# Patient Record
Sex: MALE | Race: WHITE | Employment: OTHER | URBAN - METROPOLITAN AREA
[De-identification: names, ages, dates, MRNs, and addresses within clinical notes are randomized per-mention and may not be internally consistent; named-entity substitution may affect disease eponyms.]

---

## 2017-01-23 RX ORDER — IRBESARTAN AND HYDROCHLOROTHIAZIDE 300; 12.5 MG/1; MG/1
TABLET, FILM COATED ORAL
Qty: 90 TABLET | Refills: 0 | Status: SHIPPED | OUTPATIENT
Start: 2017-01-23 | End: 2017-05-23

## 2017-01-23 RX ORDER — ATORVASTATIN CALCIUM 40 MG/1
TABLET, FILM COATED ORAL
Qty: 90 TABLET | Refills: 0 | Status: SHIPPED | OUTPATIENT
Start: 2017-01-23 | End: 2017-08-30

## 2017-02-02 RX ORDER — PEN NEEDLE, DIABETIC 31 GX5/16"
NEEDLE, DISPOSABLE MISCELLANEOUS
Qty: 200 EACH | Refills: 3 | Status: SHIPPED | OUTPATIENT
Start: 2017-02-02 | End: 2017-11-13

## 2017-02-02 NOTE — TELEPHONE ENCOUNTER
LOV 8/29/16. Attempted to reach patient to schedule follow up but no answer and no VM available to leave message.

## 2017-02-07 ENCOUNTER — PATIENT OUTREACH (OUTPATIENT)
Dept: INTERNAL MEDICINE CLINIC | Facility: CLINIC | Age: 47
End: 2017-02-07

## 2017-02-07 DIAGNOSIS — Z12.5 PROSTATE CANCER SCREENING: ICD-10-CM

## 2017-02-07 DIAGNOSIS — E10.9 TYPE 1 DIABETES MELLITUS WITHOUT COMPLICATION (HCC): Primary | ICD-10-CM

## 2017-02-07 NOTE — PROGRESS NOTES
Spoke with pt. Discussed that he is due for repeat labs:  Hb A1c and PSA. Requested that pt have them done in the next week or two. Instructed pt that he does not need to fast for this blood work. Pt agrees to this plan.    PLAN:  Follow up 1 month to rev

## 2017-02-09 ENCOUNTER — HOSPITAL ENCOUNTER (OUTPATIENT)
Age: 47
Discharge: HOME | End: 2017-02-09
Payer: COMMERCIAL

## 2017-02-09 VITALS
DIASTOLIC BLOOD PRESSURE: 81 MMHG | SYSTOLIC BLOOD PRESSURE: 147 MMHG | HEART RATE: 76 BPM | OXYGEN SATURATION: 100 % | BODY MASS INDEX: 27.09 KG/M2 | TEMPERATURE: 97 F | HEIGHT: 72 IN | RESPIRATION RATE: 18 BRPM | WEIGHT: 200 LBS

## 2017-02-09 DIAGNOSIS — J01.10 ACUTE NON-RECURRENT FRONTAL SINUSITIS: Primary | ICD-10-CM

## 2017-02-09 PROCEDURE — 99214 OFFICE O/P EST MOD 30 MIN: CPT

## 2017-02-09 PROCEDURE — 99213 OFFICE O/P EST LOW 20 MIN: CPT

## 2017-02-09 RX ORDER — FLUTICASONE PROPIONATE 50 MCG
2 SPRAY, SUSPENSION (ML) NASAL DAILY
Qty: 16 G | Refills: 0 | Status: SHIPPED | OUTPATIENT
Start: 2017-02-09 | End: 2017-03-11

## 2017-02-09 RX ORDER — AMOXICILLIN AND CLAVULANATE POTASSIUM 875; 125 MG/1; MG/1
1 TABLET, FILM COATED ORAL 2 TIMES DAILY
Qty: 20 TABLET | Refills: 0 | Status: SHIPPED | OUTPATIENT
Start: 2017-02-09 | End: 2017-02-19

## 2017-02-09 RX ORDER — ACETAMINOPHEN 325 MG/1
325 TABLET ORAL EVERY 6 HOURS PRN
COMMUNITY

## 2017-02-09 NOTE — ED INITIAL ASSESSMENT (HPI)
Pt reports cough and sinus drainage for one week. Fever of 101 on Tues.  Cough productive at times with thick brown colored phlegm

## 2017-02-09 NOTE — ED PROVIDER NOTES
Patient presents with:  Cough/URI      HPI:     Best Hdez is a 40year old male who presents with a chief complaint of sinus pain, ingestion, and a productive cough with green and yellow sputum for the past 2 weeks.   He did have a fever couple days Reviewed.       Physical Exam:     Findings:    /81 mmHg  Pulse 76  Temp(Src) 97.4 °F (36.3 °C) (Temporal)  Resp 18  Ht 182.9 cm (6')  Wt 90.719 kg  BMI 27.12 kg/m2  SpO2 100%  GENERAL: well developed, well nourished, well hydrated, no distress  SKIN:

## 2017-02-23 ENCOUNTER — TELEPHONE (OUTPATIENT)
Dept: ENDOCRINOLOGY CLINIC | Facility: CLINIC | Age: 47
End: 2017-02-23

## 2017-02-23 NOTE — TELEPHONE ENCOUNTER
Will need to be signed by [facility] will hold form until she returns to office 2/27

## 2017-02-23 NOTE — TELEPHONE ENCOUNTER
Pt dropped off letter from [facility] for Drivers License needs completed by Dr Jose Booker.  Pt will  letter, placed in nurses box

## 2017-02-27 NOTE — TELEPHONE ENCOUNTER
Form has been signed by Mitchell Arias. Called patient. Informed him form has been placed at the  in [place] for him to . He verbalized his understanding.

## 2017-03-06 ENCOUNTER — HOSPITAL ENCOUNTER (OUTPATIENT)
Age: 47
Discharge: HOME | End: 2017-03-06
Attending: EMERGENCY MEDICINE
Payer: COMMERCIAL

## 2017-03-06 VITALS
OXYGEN SATURATION: 95 % | HEART RATE: 85 BPM | DIASTOLIC BLOOD PRESSURE: 86 MMHG | SYSTOLIC BLOOD PRESSURE: 142 MMHG | TEMPERATURE: 98 F | RESPIRATION RATE: 16 BRPM

## 2017-03-06 DIAGNOSIS — B34.9 VIRAL ILLNESS: Primary | ICD-10-CM

## 2017-03-06 LAB — S PYO AG THROAT QL: NEGATIVE

## 2017-03-06 PROCEDURE — 99213 OFFICE O/P EST LOW 20 MIN: CPT

## 2017-03-06 PROCEDURE — 87430 STREP A AG IA: CPT

## 2017-03-06 PROCEDURE — 99212 OFFICE O/P EST SF 10 MIN: CPT

## 2017-03-06 NOTE — ED PROVIDER NOTES
Patient Seen in: [address]    History   Patient presents with:  Cough/URI    Stated Complaint: possible strep    HPI    Patient is is a healthy 45-year-old male who presents to immediate care along with his son who is be Status: Never Smoker                      Alcohol Use: Yes               Review of Systems   Constitutional: Negative. Negative for fever. HENT: Positive for congestion. Eyes: Negative. Respiratory: Negative. Cardiovascular: Negative.     Genito Impression:  Viral illness  (primary encounter diagnosis)    Disposition:  Discharge    Follow-up:  Sam Goff MD  [id number] [facility] [address] [phone]    In 3 days  If symptoms worsen      Medications Prescribed:  Current Discharge

## 2017-03-17 ENCOUNTER — TELEPHONE (OUTPATIENT)
Dept: ENDOCRINOLOGY CLINIC | Facility: CLINIC | Age: 47
End: 2017-03-17

## 2017-03-17 NOTE — TELEPHONE ENCOUNTER
Pt drooped off form again states Dr Ava Jean missed checking one of the boxes on form,call pt when ready .  Envelope left in nurses box

## 2017-03-24 ENCOUNTER — HOSPITAL ENCOUNTER (OUTPATIENT)
Age: 47
Discharge: HOME | End: 2017-03-24
Payer: COMMERCIAL

## 2017-03-24 VITALS
HEIGHT: 72 IN | SYSTOLIC BLOOD PRESSURE: 133 MMHG | WEIGHT: 200 LBS | OXYGEN SATURATION: 98 % | BODY MASS INDEX: 27.09 KG/M2 | TEMPERATURE: 98 F | RESPIRATION RATE: 14 BRPM | DIASTOLIC BLOOD PRESSURE: 82 MMHG | HEART RATE: 79 BPM

## 2017-03-24 DIAGNOSIS — J06.9 VIRAL UPPER RESPIRATORY TRACT INFECTION: Primary | ICD-10-CM

## 2017-03-24 LAB — S PYO AG THROAT QL: NEGATIVE

## 2017-03-24 PROCEDURE — 99212 OFFICE O/P EST SF 10 MIN: CPT

## 2017-03-24 PROCEDURE — 87430 STREP A AG IA: CPT

## 2017-03-24 PROCEDURE — 99213 OFFICE O/P EST LOW 20 MIN: CPT

## 2017-03-24 NOTE — ED INITIAL ASSESSMENT (HPI)
REPORTS COFFEE COLORED NASAL DRAINAGE SINCE LAST NIGHT. COUGH FOR A \"COUPLE OF DAYS. \"  DENIES Elke Welch. SEMAJ SIFUENTES AT THE BEDSIDE. DID NOT RECEIVE A FLU VACCINATION THIS YEAR.

## 2017-03-24 NOTE — ED PROVIDER NOTES
[facility]    History   Patient presents with:  Cough/URI    Stated Complaint: CONGESTION    HPI    Patient is a 50-year-old male with a history of type 1 diabetes who presents for evaluation of congestio cough. Cardiovascular: Negative. Gastrointestinal: Negative. Endocrine: Negative. Genitourinary: Negative. Musculoskeletal: Negative. Skin: Negative. Allergic/Immunologic: Negative. Neurological: Negative.     Hematological: Negative He has a normal mood and affect. His behavior is normal. Judgment and thought content normal.   Nursing note and vitals reviewed. ED Course     Labs Reviewed   EMH POCT RAPID STREP - Normal       MDM   Vitals stable, patient appears nontoxic.   Phy

## 2017-04-24 ENCOUNTER — OFFICE VISIT (OUTPATIENT)
Dept: ENDOCRINOLOGY CLINIC | Facility: CLINIC | Age: 47
End: 2017-04-24

## 2017-04-24 VITALS
DIASTOLIC BLOOD PRESSURE: 70 MMHG | HEART RATE: 92 BPM | WEIGHT: 220.63 LBS | HEIGHT: 72 IN | BODY MASS INDEX: 29.88 KG/M2 | SYSTOLIC BLOOD PRESSURE: 126 MMHG

## 2017-04-24 DIAGNOSIS — E10.65 UNCONTROLLED TYPE 1 DIABETES MELLITUS WITH HYPERGLYCEMIA (HCC): Primary | ICD-10-CM

## 2017-04-24 PROCEDURE — 83036 HEMOGLOBIN GLYCOSYLATED A1C: CPT | Performed by: INTERNAL MEDICINE

## 2017-04-24 PROCEDURE — 82962 GLUCOSE BLOOD TEST: CPT | Performed by: INTERNAL MEDICINE

## 2017-04-24 PROCEDURE — 36416 COLLJ CAPILLARY BLOOD SPEC: CPT | Performed by: INTERNAL MEDICINE

## 2017-04-24 PROCEDURE — 99214 OFFICE O/P EST MOD 30 MIN: CPT | Performed by: INTERNAL MEDICINE

## 2017-04-24 NOTE — PROGRESS NOTES
Name: Sherita Lennox  Date: 4/24/2017    Referring Physician: No ref. provider found    HISTORY OF PRESENT ILLNESS   Sherita Lennox is a 56year old male who presents for diabetes mellitus.   He has not been for routine follow up and BG levels have sign 3  •  HUMALOG KWIKPEN 100 UNIT/ML Subcutaneous Solution Pen-injector, INJECT 50 UNITS INTO THE SKIN EVERY DAY AS DIRECTED (Patient taking differently: INJECT 40-50 UNITS INTO THE SKIN EVERY DAY AS DIRECTED), Disp: 30 mL, Rfl: 3  •  Insulin Pen Needle (1ST to time, self, and place  Nutritional:  no abnormal weight gain or loss    ASSESSMENT/PLAN:      1.  Diabetes Mellitus Type 1, Uncontrolled  -Uncontrolled, HgA1c 10.1% -->stable  -I suspect that he continues to miss insulin doses leading to uncontrolled hyp

## 2017-05-18 RX ORDER — INSULIN GLARGINE 100 [IU]/ML
INJECTION, SOLUTION SUBCUTANEOUS
Qty: 30 ML | Refills: 3 | Status: SHIPPED | OUTPATIENT
Start: 2017-05-18 | End: 2018-01-03

## 2017-05-23 RX ORDER — IRBESARTAN AND HYDROCHLOROTHIAZIDE 300; 12.5 MG/1; MG/1
TABLET, FILM COATED ORAL
Qty: 90 TABLET | Refills: 1 | Status: SHIPPED | OUTPATIENT
Start: 2017-05-23 | End: 2018-01-08

## 2017-05-23 RX ORDER — INSULIN LISPRO 100 [IU]/ML
INJECTION, SOLUTION INTRAVENOUS; SUBCUTANEOUS
Qty: 30 ML | Refills: 2 | Status: SHIPPED | OUTPATIENT
Start: 2017-05-23 | End: 2017-12-12

## 2017-05-26 ENCOUNTER — PATIENT OUTREACH (OUTPATIENT)
Dept: INTERNAL MEDICINE CLINIC | Facility: CLINIC | Age: 47
End: 2017-05-26

## 2017-05-30 ENCOUNTER — HOSPITAL ENCOUNTER (OUTPATIENT)
Age: 47
Discharge: HOME | End: 2017-05-30
Payer: COMMERCIAL

## 2017-05-30 VITALS
HEIGHT: 72 IN | HEART RATE: 88 BPM | RESPIRATION RATE: 18 BRPM | DIASTOLIC BLOOD PRESSURE: 85 MMHG | TEMPERATURE: 98 F | SYSTOLIC BLOOD PRESSURE: 130 MMHG | OXYGEN SATURATION: 96 % | WEIGHT: 215 LBS | BODY MASS INDEX: 29.12 KG/M2

## 2017-05-30 DIAGNOSIS — J01.00 ACUTE NON-RECURRENT MAXILLARY SINUSITIS: Primary | ICD-10-CM

## 2017-05-30 DIAGNOSIS — R05.9 COUGH: ICD-10-CM

## 2017-05-30 PROCEDURE — 99214 OFFICE O/P EST MOD 30 MIN: CPT

## 2017-05-30 PROCEDURE — 99213 OFFICE O/P EST LOW 20 MIN: CPT

## 2017-05-30 RX ORDER — AMOXICILLIN AND CLAVULANATE POTASSIUM 875; 125 MG/1; MG/1
1 TABLET, FILM COATED ORAL 2 TIMES DAILY
Qty: 20 TABLET | Refills: 0 | Status: SHIPPED | OUTPATIENT
Start: 2017-05-30 | End: 2017-06-09

## 2017-05-30 RX ORDER — FLUTICASONE PROPIONATE 50 MCG
2 SPRAY, SUSPENSION (ML) NASAL DAILY
Qty: 16 G | Refills: 0 | Status: SHIPPED | OUTPATIENT
Start: 2017-05-30 | End: 2017-06-29

## 2017-05-31 NOTE — ED PROVIDER NOTES
Patient Seen in: [facility] Immediate Care In [address] [place] [address]    History   Patient presents with:  Cough/URI    Stated Complaint: URI    HPI    52-year-old male presents for complaint of cough, nasal congestion, facial pressure.   Symptoms have been prese Alcohol Use: Yes               Review of Systems   Constitutional: Negative. HENT: Positive for congestion, ear pain, postnasal drip and sinus pressure. Eyes: Negative. Respiratory: Positive for cough. Cardiovascular: Negative.     Jose Romero rales.   Musculoskeletal: Normal range of motion. Neurological: He is alert and oriented to person, place, and time. Skin: Skin is warm, dry and intact. Psychiatric: He has a normal mood and affect.  His speech is normal and behavior is normal.   Nurs

## 2017-05-31 NOTE — ED INITIAL ASSESSMENT (HPI)
Ill for the past 2 days. Cough with productive mucus. No fever.  Has facial pressure and post nasal drip

## 2017-08-30 RX ORDER — ATORVASTATIN CALCIUM 40 MG/1
TABLET, FILM COATED ORAL
Qty: 90 TABLET | Refills: 0 | Status: SHIPPED | OUTPATIENT
Start: 2017-08-30 | End: 2017-11-22

## 2017-10-10 ENCOUNTER — HOSPITAL ENCOUNTER (OUTPATIENT)
Age: 47
Discharge: HOME | End: 2017-10-10
Attending: EMERGENCY MEDICINE
Payer: COMMERCIAL

## 2017-10-10 ENCOUNTER — APPOINTMENT (OUTPATIENT)
Dept: GENERAL RADIOLOGY | Age: 47
End: 2017-10-10
Attending: EMERGENCY MEDICINE
Payer: COMMERCIAL

## 2017-10-10 VITALS
TEMPERATURE: 99 F | BODY MASS INDEX: 28.99 KG/M2 | SYSTOLIC BLOOD PRESSURE: 160 MMHG | RESPIRATION RATE: 18 BRPM | HEART RATE: 85 BPM | WEIGHT: 214 LBS | OXYGEN SATURATION: 96 % | HEIGHT: 72 IN | DIASTOLIC BLOOD PRESSURE: 92 MMHG

## 2017-10-10 DIAGNOSIS — J18.9 COMMUNITY ACQUIRED PNEUMONIA OF LEFT LUNG, UNSPECIFIED PART OF LUNG: Primary | ICD-10-CM

## 2017-10-10 PROCEDURE — 99214 OFFICE O/P EST MOD 30 MIN: CPT

## 2017-10-10 PROCEDURE — 71020 XR CHEST PA + LAT CHEST (CPT=71020): CPT | Performed by: EMERGENCY MEDICINE

## 2017-10-10 PROCEDURE — 99213 OFFICE O/P EST LOW 20 MIN: CPT

## 2017-10-10 RX ORDER — LEVOFLOXACIN 750 MG/1
750 TABLET ORAL DAILY
Qty: 7 TABLET | Refills: 0 | Status: SHIPPED | OUTPATIENT
Start: 2017-10-10 | End: 2017-10-17

## 2017-10-10 NOTE — ED INITIAL ASSESSMENT (HPI)
Cough for 2 days with fever and thick yellow sputum. Fever comes down with otc motrin and tylenol.  Easy non labored resp

## 2017-10-10 NOTE — ED PROVIDER NOTES
Patient Seen in: [facility] Immediate Care In [address] [place] [address]    History   Patient presents with:  Cough/URI    Stated Complaint: coughing, back pain    HPI    52-year-old male, history of type 1 diabetes and essential hypertension, presents complaining Ear: External ear normal.   Left Ear: External ear normal.   Mouth/Throat: Oropharynx is clear and moist.   Eyes: Conjunctivae and EOM are normal.   Cardiovascular: Normal rate. No murmur heard.   Pulmonary/Chest: Effort normal and breath sounds normal.

## 2017-10-21 ENCOUNTER — HOSPITAL ENCOUNTER (OUTPATIENT)
Age: 47
Discharge: HOME | End: 2017-10-21
Attending: EMERGENCY MEDICINE
Payer: COMMERCIAL

## 2017-10-21 ENCOUNTER — APPOINTMENT (OUTPATIENT)
Dept: GENERAL RADIOLOGY | Age: 47
End: 2017-10-21
Attending: EMERGENCY MEDICINE
Payer: COMMERCIAL

## 2017-10-21 VITALS
SYSTOLIC BLOOD PRESSURE: 144 MMHG | TEMPERATURE: 98 F | HEIGHT: 72 IN | OXYGEN SATURATION: 97 % | RESPIRATION RATE: 16 BRPM | BODY MASS INDEX: 28.44 KG/M2 | HEART RATE: 82 BPM | DIASTOLIC BLOOD PRESSURE: 81 MMHG | WEIGHT: 210 LBS

## 2017-10-21 DIAGNOSIS — J18.9 COMMUNITY ACQUIRED PNEUMONIA OF LEFT UPPER LOBE OF LUNG: Primary | ICD-10-CM

## 2017-10-21 PROCEDURE — 71020 XR CHEST PA + LAT CHEST (CPT=71020): CPT | Performed by: EMERGENCY MEDICINE

## 2017-10-21 PROCEDURE — 94640 AIRWAY INHALATION TREATMENT: CPT

## 2017-10-21 PROCEDURE — 99214 OFFICE O/P EST MOD 30 MIN: CPT

## 2017-10-21 RX ORDER — ALBUTEROL SULFATE 2.5 MG/3ML
2.5 SOLUTION RESPIRATORY (INHALATION) ONCE
Status: COMPLETED | OUTPATIENT
Start: 2017-10-21 | End: 2017-10-21

## 2017-10-21 NOTE — ED INITIAL ASSESSMENT (HPI)
C/o coughing 1 1/2 week was Dx with Pneumonia. Now pt claims he is having chest tightness.  No fever

## 2017-10-21 NOTE — ED PROVIDER NOTES
Patient Seen in: [facility] Immediate Care In [address] [place] [address]    History   Patient presents with:  Cough/URI    Stated Complaint: chest tightness/sob    HPI    Patient is a 70-year-old male who presents to the urgent care with a chief complaint of persis Eyes: Conjunctivae and EOM are normal. Pupils are equal, round, and reactive to light. Neck: Neck supple. Cardiovascular: Normal rate, regular rhythm and normal heart sounds. Pulmonary/Chest: Effort normal. He has rhonchi in the left middle field.

## 2017-11-13 RX ORDER — PEN NEEDLE, DIABETIC 31 GX5/16"
NEEDLE, DISPOSABLE MISCELLANEOUS
Qty: 200 EACH | Refills: 1 | Status: SHIPPED | OUTPATIENT
Start: 2017-11-13 | End: 2018-01-08

## 2017-11-20 ENCOUNTER — HOSPITAL ENCOUNTER (OUTPATIENT)
Age: 47
Discharge: HOME | End: 2017-11-20
Attending: EMERGENCY MEDICINE
Payer: COMMERCIAL

## 2017-11-20 VITALS
WEIGHT: 200 LBS | SYSTOLIC BLOOD PRESSURE: 145 MMHG | OXYGEN SATURATION: 97 % | RESPIRATION RATE: 16 BRPM | HEART RATE: 78 BPM | BODY MASS INDEX: 27 KG/M2 | DIASTOLIC BLOOD PRESSURE: 83 MMHG | TEMPERATURE: 98 F

## 2017-11-20 DIAGNOSIS — J01.90 ACUTE SINUSITIS, RECURRENCE NOT SPECIFIED, UNSPECIFIED LOCATION: Primary | ICD-10-CM

## 2017-11-20 PROCEDURE — 99214 OFFICE O/P EST MOD 30 MIN: CPT

## 2017-11-20 PROCEDURE — 99213 OFFICE O/P EST LOW 20 MIN: CPT

## 2017-11-20 RX ORDER — AMOXICILLIN AND CLAVULANATE POTASSIUM 875; 125 MG/1; MG/1
1 TABLET, FILM COATED ORAL 2 TIMES DAILY
Qty: 14 TABLET | Refills: 0 | Status: SHIPPED | OUTPATIENT
Start: 2017-11-20 | End: 2017-11-27

## 2017-11-20 NOTE — ED PROVIDER NOTES
Patient Seen in: [facility] Immediate Care In [address] [place] [address]    History   Patient presents with:  Cough/URI    Stated Complaint: sinus pain    HPI    Patient is a 52-year-old man with a history of type 1 diabetes who complains of sinus congestion and di round, and reactive to light. Neck: Neck supple. Cardiovascular: Normal rate, regular rhythm, normal heart sounds and intact distal pulses. Pulmonary/Chest: Effort normal and breath sounds normal. No respiratory distress. Abdominal: Soft.  Bowel so in a couple days. He is traveling out of town for Thanksgiving and 3 days. He will continue over-the-counter symptomatic treatment.     Condition upon leaving the department: Stable            Disposition and Plan     Clinical Impression:  Acute sinusitis

## 2017-11-22 RX ORDER — ATORVASTATIN CALCIUM 40 MG/1
TABLET, FILM COATED ORAL
Qty: 30 TABLET | Refills: 0 | Status: SHIPPED | OUTPATIENT
Start: 2017-11-22 | End: 2018-01-08

## 2017-12-12 RX ORDER — INSULIN LISPRO 100 [IU]/ML
INJECTION, SOLUTION INTRAVENOUS; SUBCUTANEOUS
Qty: 30 ML | Refills: 1 | Status: SHIPPED | OUTPATIENT
Start: 2017-12-12 | End: 2018-01-08

## 2017-12-12 NOTE — TELEPHONE ENCOUNTER
LOV 4/2017. Follow up scheduled 1/2018. Per [facility] protocol ok to refill through upcoming appt.

## 2018-01-03 RX ORDER — INSULIN GLARGINE 100 [IU]/ML
INJECTION, SOLUTION SUBCUTANEOUS
Qty: 30 ML | Refills: 0 | Status: SHIPPED | OUTPATIENT
Start: 2018-01-03 | End: 2018-01-08

## 2018-01-05 ENCOUNTER — HOSPITAL ENCOUNTER (OUTPATIENT)
Age: 48
Discharge: HOME | End: 2018-01-05
Payer: COMMERCIAL

## 2018-01-05 VITALS
DIASTOLIC BLOOD PRESSURE: 86 MMHG | HEIGHT: 72 IN | TEMPERATURE: 98 F | HEART RATE: 83 BPM | BODY MASS INDEX: 28.44 KG/M2 | OXYGEN SATURATION: 98 % | WEIGHT: 210 LBS | SYSTOLIC BLOOD PRESSURE: 155 MMHG | RESPIRATION RATE: 16 BRPM

## 2018-01-05 DIAGNOSIS — J01.10 ACUTE NON-RECURRENT FRONTAL SINUSITIS: Primary | ICD-10-CM

## 2018-01-05 PROCEDURE — 99214 OFFICE O/P EST MOD 30 MIN: CPT

## 2018-01-05 PROCEDURE — 99213 OFFICE O/P EST LOW 20 MIN: CPT

## 2018-01-05 RX ORDER — CEFDINIR 300 MG/1
300 CAPSULE ORAL 2 TIMES DAILY
Qty: 20 CAPSULE | Refills: 0 | Status: SHIPPED | OUTPATIENT
Start: 2018-01-05 | End: 2018-01-15

## 2018-01-05 NOTE — ED PROVIDER NOTES
Patient presents with:  Cough/URI      HPI:     Jany Guthrie is a 56year old male who presents with a chief complaint of frontal sinus congestion, thick purulent drainage coming from the nose, frontal sinus discomfort, postnasal drip, and a dry cough 155/86   Pulse 83   Temp 98.4 °F (36.9 °C) (Oral)   Resp 16   Ht 182.9 cm (6')   Wt 95.3 kg   SpO2 98%   BMI 28.48 kg/m²   GENERAL: well developed, well nourished, well hydrated, no distress  SKIN: good skin turgor, no obvious rashes  NECK: supple, no glenys

## 2018-01-05 NOTE — ED INITIAL ASSESSMENT (HPI)
Presents with facial pressure and nasal congestion with thick drainage for 2 weeks. Reports fever as high as 99F, denies cough, sore throat, SOB, or chest pain.

## 2018-01-08 ENCOUNTER — APPOINTMENT (OUTPATIENT)
Dept: LAB | Facility: HOSPITAL | Age: 48
End: 2018-01-08
Attending: INTERNAL MEDICINE
Payer: COMMERCIAL

## 2018-01-08 ENCOUNTER — OFFICE VISIT (OUTPATIENT)
Dept: ENDOCRINOLOGY CLINIC | Facility: CLINIC | Age: 48
End: 2018-01-08

## 2018-01-08 VITALS
WEIGHT: 221 LBS | SYSTOLIC BLOOD PRESSURE: 136 MMHG | BODY MASS INDEX: 29.93 KG/M2 | HEART RATE: 92 BPM | HEIGHT: 72 IN | DIASTOLIC BLOOD PRESSURE: 70 MMHG

## 2018-01-08 DIAGNOSIS — Z79.4 UNCONTROLLED TYPE 2 DIABETES MELLITUS WITH HYPERGLYCEMIA, WITH LONG-TERM CURRENT USE OF INSULIN (HCC): Primary | ICD-10-CM

## 2018-01-08 DIAGNOSIS — E11.65 UNCONTROLLED TYPE 2 DIABETES MELLITUS WITH HYPERGLYCEMIA, WITH LONG-TERM CURRENT USE OF INSULIN (HCC): Primary | ICD-10-CM

## 2018-01-08 DIAGNOSIS — E10.65 UNCONTROLLED TYPE 1 DIABETES MELLITUS WITH HYPERGLYCEMIA (HCC): ICD-10-CM

## 2018-01-08 LAB
ALBUMIN SERPL BCP-MCNC: 4.7 G/DL (ref 3.5–4.8)
ALBUMIN/GLOB SERPL: 1.6 {RATIO} (ref 1–2)
ALP SERPL-CCNC: 73 U/L (ref 32–100)
ALT SERPL-CCNC: 24 U/L (ref 17–63)
ANION GAP SERPL CALC-SCNC: 8 MMOL/L (ref 0–18)
AST SERPL-CCNC: 24 U/L (ref 15–41)
BILIRUB SERPL-MCNC: 0.8 MG/DL (ref 0.3–1.2)
BUN SERPL-MCNC: 13 MG/DL (ref 8–20)
BUN/CREAT SERPL: 11.5 (ref 10–20)
CALCIUM SERPL-MCNC: 9.8 MG/DL (ref 8.5–10.5)
CARTRIDGE LOT#: ABNORMAL NUMERIC
CHLORIDE SERPL-SCNC: 102 MMOL/L (ref 95–110)
CHOLEST SERPL-MCNC: 185 MG/DL (ref 110–200)
CO2 SERPL-SCNC: 29 MMOL/L (ref 22–32)
CREAT SERPL-MCNC: 1.13 MG/DL (ref 0.5–1.5)
CREAT UR-MCNC: 117.5 MG/DL
GLOBULIN PLAS-MCNC: 3 G/DL (ref 2.5–3.7)
GLUCOSE BLOOD: 81
GLUCOSE SERPL-MCNC: 81 MG/DL (ref 70–99)
HDLC SERPL-MCNC: 46 MG/DL
HEMOGLOBIN A1C: 10 % (ref 4.3–5.6)
LDLC SERPL CALC-MCNC: 121 MG/DL (ref 0–99)
MICROALBUMIN UR-MCNC: 7.3 MG/DL (ref 0–1.8)
MICROALBUMIN/CREAT UR: 62.1 MG/G{CREAT} (ref 0–20)
NONHDLC SERPL-MCNC: 139 MG/DL
OSMOLALITY UR CALC.SUM OF ELEC: 287 MOSM/KG (ref 275–295)
POTASSIUM SERPL-SCNC: 4.4 MMOL/L (ref 3.3–5.1)
PROT SERPL-MCNC: 7.7 G/DL (ref 5.9–8.4)
SODIUM SERPL-SCNC: 139 MMOL/L (ref 136–144)
TEST STRIP LOT #: NORMAL NUMERIC
TRIGL SERPL-MCNC: 92 MG/DL (ref 1–149)
TSH SERPL-ACNC: 3.98 UIU/ML (ref 0.45–5.33)

## 2018-01-08 PROCEDURE — 99212 OFFICE O/P EST SF 10 MIN: CPT | Performed by: INTERNAL MEDICINE

## 2018-01-08 PROCEDURE — 82570 ASSAY OF URINE CREATININE: CPT

## 2018-01-08 PROCEDURE — 36415 COLL VENOUS BLD VENIPUNCTURE: CPT

## 2018-01-08 PROCEDURE — 82962 GLUCOSE BLOOD TEST: CPT | Performed by: INTERNAL MEDICINE

## 2018-01-08 PROCEDURE — 36416 COLLJ CAPILLARY BLOOD SPEC: CPT | Performed by: INTERNAL MEDICINE

## 2018-01-08 PROCEDURE — 84443 ASSAY THYROID STIM HORMONE: CPT

## 2018-01-08 PROCEDURE — 80053 COMPREHEN METABOLIC PANEL: CPT

## 2018-01-08 PROCEDURE — 82043 UR ALBUMIN QUANTITATIVE: CPT

## 2018-01-08 PROCEDURE — 80061 LIPID PANEL: CPT

## 2018-01-08 PROCEDURE — 83036 HEMOGLOBIN GLYCOSYLATED A1C: CPT | Performed by: INTERNAL MEDICINE

## 2018-01-08 PROCEDURE — 99214 OFFICE O/P EST MOD 30 MIN: CPT | Performed by: INTERNAL MEDICINE

## 2018-01-08 RX ORDER — IRBESARTAN AND HYDROCHLOROTHIAZIDE 300; 12.5 MG/1; MG/1
TABLET, FILM COATED ORAL
Qty: 90 TABLET | Refills: 1 | Status: SHIPPED | OUTPATIENT
Start: 2018-01-08 | End: 2018-06-22

## 2018-01-08 RX ORDER — ATORVASTATIN CALCIUM 40 MG/1
TABLET, FILM COATED ORAL
Qty: 30 TABLET | Refills: 1 | Status: SHIPPED | OUTPATIENT
Start: 2018-01-08 | End: 2018-10-06

## 2018-01-08 NOTE — PROGRESS NOTES
Name: Shruthi Stanton  Date: 1/8/2018    Referring Physician: No ref. provider found    HISTORY OF PRESENT ILLNESS   Shruthi Stanton is a 56year old male who presents for diabetes mellitus.   He has not been for routine follow up and BG levels have signi SKIN EVERY DAY AS DIRECTED (Patient taking differently: per sliding scale), Disp: 30 mL, Rfl: 1  •  ATORVASTATIN 40 MG Oral Tab, TAKE ONE TABLET BY MOUTH NIGHTLY, Disp: 30 tablet, Rfl: 0  •  BD PEN NEEDLE SHORT U/F 31G X 8 MM Does not apply Misc, USE FOUR murmurs, S3 or S4  Gastrointestinal:  normal bowel sounds and no palpable masses in abdomen, organomegaly or tenderness   Musculoskeletal:  normal muscle strength and tone  Skin:  normal moisture and skin texture  Neuro:  sensory grossly intact and motor g

## 2018-01-10 ENCOUNTER — TELEPHONE (OUTPATIENT)
Dept: ENDOCRINOLOGY CLINIC | Facility: CLINIC | Age: 48
End: 2018-01-10

## 2018-03-25 ENCOUNTER — HOSPITAL ENCOUNTER (OUTPATIENT)
Age: 48
Discharge: HOME | End: 2018-03-25
Attending: EMERGENCY MEDICINE
Payer: COMMERCIAL

## 2018-03-25 VITALS
SYSTOLIC BLOOD PRESSURE: 160 MMHG | TEMPERATURE: 98 F | DIASTOLIC BLOOD PRESSURE: 88 MMHG | OXYGEN SATURATION: 99 % | HEIGHT: 72 IN | HEART RATE: 84 BPM | BODY MASS INDEX: 28.44 KG/M2 | WEIGHT: 210 LBS | RESPIRATION RATE: 16 BRPM

## 2018-03-25 DIAGNOSIS — J01.40 ACUTE NON-RECURRENT PANSINUSITIS: Primary | ICD-10-CM

## 2018-03-25 PROCEDURE — 99213 OFFICE O/P EST LOW 20 MIN: CPT

## 2018-03-25 PROCEDURE — 99214 OFFICE O/P EST MOD 30 MIN: CPT

## 2018-03-25 RX ORDER — AMOXICILLIN AND CLAVULANATE POTASSIUM 875; 125 MG/1; MG/1
1 TABLET, FILM COATED ORAL 2 TIMES DAILY
Qty: 20 TABLET | Refills: 0 | Status: SHIPPED | OUTPATIENT
Start: 2018-03-25 | End: 2018-04-04

## 2018-03-25 NOTE — ED PROVIDER NOTES
Patient Seen in: [facility] Immediate Care In [address] [place] [address]    History   Patient presents with:  Cough/URI    Stated Complaint: HA/Sinus Pain    HPI    The patient is a 50-year-old male with past history of hypertension, diabetes mellitus who presents mild posterior pharyngeal erythema. There is mild diffuse sinus tenderness to palpation.   Chest: Clear to auscultation, no tenderness  Cardiovascular: Regular rate and rhythm without murmur  Abdomen: Soft, nontender and nondistended  Neurologic: Patient i

## 2018-05-16 ENCOUNTER — TELEPHONE (OUTPATIENT)
Dept: ENDOCRINOLOGY CLINIC | Facility: CLINIC | Age: 48
End: 2018-05-16

## 2018-05-16 RX ORDER — INSULIN LISPRO 100 [IU]/ML
INJECTION, SOLUTION INTRAVENOUS; SUBCUTANEOUS
Qty: 30 ML | Refills: 0 | Status: CANCELLED | OUTPATIENT
Start: 2018-05-16

## 2018-05-16 NOTE — TELEPHONE ENCOUNTER
Rita/Pharm calling for mutual pt regarding refill rx:Humalog,pls call at:[phone],thanks.    *Pt is completely out of rx  Current Outpatient Prescriptions:   •  Insulin Lispro (HUMALOG KWIKPEN) 100 UNIT/ML Subcutaneous Solution Pen-injector, ADMINI

## 2018-05-16 NOTE — TELEPHONE ENCOUNTER
Patient states he is completely out of Humalog insulin - called pt and booked f/u first available apt in June    Filled order per [facility] protocol    LOV 1/8/18 - F/U 6/22/18    Called pt at work and booked MD approval slot on 6/22/18 @1:30 in order to fill ins

## 2018-06-15 RX ORDER — INSULIN LISPRO 100 [IU]/ML
INJECTION, SOLUTION INTRAVENOUS; SUBCUTANEOUS
Qty: 45 ML | Refills: 1 | Status: SHIPPED | OUTPATIENT
Start: 2018-06-15 | End: 2019-11-11 | Stop reason: DRUGHIGH

## 2018-06-22 ENCOUNTER — OFFICE VISIT (OUTPATIENT)
Dept: ENDOCRINOLOGY CLINIC | Facility: CLINIC | Age: 48
End: 2018-06-22

## 2018-06-22 VITALS
HEIGHT: 72 IN | RESPIRATION RATE: 18 BRPM | TEMPERATURE: 98 F | BODY MASS INDEX: 30.5 KG/M2 | HEART RATE: 92 BPM | WEIGHT: 225.19 LBS | DIASTOLIC BLOOD PRESSURE: 84 MMHG | SYSTOLIC BLOOD PRESSURE: 155 MMHG

## 2018-06-22 DIAGNOSIS — E10.8 UNCONTROLLED TYPE 1 DIABETES MELLITUS WITH COMPLICATION (HCC): Primary | ICD-10-CM

## 2018-06-22 DIAGNOSIS — E10.65 UNCONTROLLED TYPE 1 DIABETES MELLITUS WITH COMPLICATION (HCC): Primary | ICD-10-CM

## 2018-06-22 PROCEDURE — 36416 COLLJ CAPILLARY BLOOD SPEC: CPT | Performed by: INTERNAL MEDICINE

## 2018-06-22 PROCEDURE — 82962 GLUCOSE BLOOD TEST: CPT | Performed by: INTERNAL MEDICINE

## 2018-06-22 PROCEDURE — 99212 OFFICE O/P EST SF 10 MIN: CPT | Performed by: INTERNAL MEDICINE

## 2018-06-22 PROCEDURE — 83036 HEMOGLOBIN GLYCOSYLATED A1C: CPT | Performed by: INTERNAL MEDICINE

## 2018-06-22 PROCEDURE — 99214 OFFICE O/P EST MOD 30 MIN: CPT | Performed by: INTERNAL MEDICINE

## 2018-06-22 RX ORDER — IRBESARTAN AND HYDROCHLOROTHIAZIDE 300; 12.5 MG/1; MG/1
TABLET, FILM COATED ORAL
Qty: 90 TABLET | Refills: 1 | Status: SHIPPED | OUTPATIENT
Start: 2018-06-22 | End: 2019-09-26

## 2018-06-22 NOTE — PROGRESS NOTES
Name: Dontrell Oneill  Date: 6/22/2018    Referring Physician: No ref. provider found    HISTORY OF PRESENT ILLNESS   Dontrell Oneill is a 53year old male who presents for diabetes mellitus.   He has not been for routine follow up and BG levels have sign Disp: 30 mL, Rfl: 2  •  atorvastatin 40 MG Oral Tab, TAKE ONE TABLET BY MOUTH NIGHTLY, Disp: 30 tablet, Rfl: 1  •  Insulin Pen Needle (BD PEN NEEDLE SHORT U/F) 31G X 8 MM Does not apply Misc, USE FOUR TIMES DAILY AS DIRECTED, Disp: 200 each, Rfl: 1  •  Irb auscultation bilaterally  Cardiovascular:  regular rate, rhythm, , no murmurs, S3 or S4  Gastrointestinal:  normal bowel sounds and no palpable masses in abdomen, organomegaly or tenderness   Musculoskeletal:  normal muscle strength and tone  Skin:  normal

## 2018-06-22 NOTE — PATIENT INSTRUCTIONS
Lantus 54 units SQ daily     Humalog  INSULIN SLIDING SCALE  Base Values  Breakfast: 18  Lunch: 18  Dinner: 18  Ranges:  80-99: -2  100-119: 0  120-139: 0  140-159: 0  160-179: 1  180-199: 1  200-219: 2  220-239: 2  240-259: 3  260-279: 3  280-299: 4  300-

## 2018-06-28 RX ORDER — PEN NEEDLE, DIABETIC 31 GX5/16"
NEEDLE, DISPOSABLE MISCELLANEOUS
Qty: 400 EACH | Refills: 0 | Status: SHIPPED | OUTPATIENT
Start: 2018-06-28 | End: 2018-11-10

## 2018-08-10 ENCOUNTER — HOSPITAL ENCOUNTER (OUTPATIENT)
Age: 48
Discharge: HOME | End: 2018-08-10
Attending: FAMILY MEDICINE
Payer: COMMERCIAL

## 2018-08-10 VITALS
TEMPERATURE: 98 F | HEART RATE: 79 BPM | DIASTOLIC BLOOD PRESSURE: 99 MMHG | SYSTOLIC BLOOD PRESSURE: 157 MMHG | OXYGEN SATURATION: 97 % | RESPIRATION RATE: 18 BRPM
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
## 2018-08-10 PROCEDURE — 99213 OFFICE O/P EST LOW 20 MIN: CPT

## 2018-08-10 PROCEDURE — 99214 OFFICE O/P EST MOD 30 MIN: CPT

## 2018-08-10 RX ORDER — PREDNISONE 10 MG/1
TABLET ORAL
Qty: 6 TABLET | Refills: 0 | Status: SHIPPED | OUTPATIENT
Start: 2018-08-10 | End: 2018-08-12

## 2018-08-10 RX ORDER — CEFADROXIL 500 MG/1
500 CAPSULE ORAL 2 TIMES DAILY
Qty: 20 CAPSULE | Refills: 0 | Status: SHIPPED | OUTPATIENT
Start: 2018-08-10 | End: 2018-08-20

## 2018-08-10 NOTE — ED PROVIDER NOTES
Pt seen in [facility]IN[facility] Immediate Care In [place]      Stated Complaint: Patient presents with:  Rash Skin Problem (integumentary)      --------------   RN assessment:     Stung by yellow jacket- to R ankle  --------------    CC:  Bug bite to ankle negative for  jaundice and odynophagia  Genitourinary:negative for decreased stream and nocturia  Behavioral/Psych: negative for aggressive behavior and hallucinations  10 point review of systems is otherwise negative.     Objective   Vitals Ranges and Last Impression:    Insect bites and stings, undetermined intent, initial encounter  (primary encounter diagnosis)    Disposition: Home     Discharge    Follow-up:    Rock Cadena MD  [facility]  [phone]    Go to   As jessicae

## 2018-08-10 NOTE — ED INITIAL ASSESSMENT (HPI)
Stung by a yellow jacket to R ankle. +redness, pain, swelling and warmth to area. Denies fever, chills or body aches.

## 2018-08-23 RX ORDER — INSULIN GLARGINE 100 [IU]/ML
INJECTION, SOLUTION SUBCUTANEOUS
Qty: 30 ML | Refills: 0 | Status: SHIPPED | OUTPATIENT
Start: 2018-08-23 | End: 2018-10-25

## 2018-08-27 ENCOUNTER — TELEPHONE (OUTPATIENT)
Dept: ENDOCRINOLOGY CLINIC | Facility: CLINIC | Age: 48
End: 2018-08-27

## 2018-08-27 ENCOUNTER — APPOINTMENT (OUTPATIENT)
Dept: LAB | Facility: HOSPITAL | Age: 48
End: 2018-08-27
Attending: INTERNAL MEDICINE
Payer: COMMERCIAL

## 2018-08-27 ENCOUNTER — OFFICE VISIT (OUTPATIENT)
Dept: ENDOCRINOLOGY CLINIC | Facility: CLINIC | Age: 48
End: 2018-08-27
Payer: COMMERCIAL

## 2018-08-27 VITALS
BODY MASS INDEX: 30.48 KG/M2 | WEIGHT: 225 LBS | HEART RATE: 98 BPM | HEIGHT: 72 IN | SYSTOLIC BLOOD PRESSURE: 157 MMHG | DIASTOLIC BLOOD PRESSURE: 91 MMHG

## 2018-08-27 DIAGNOSIS — E10.8 UNCONTROLLED TYPE 1 DIABETES MELLITUS WITH COMPLICATION (HCC): Primary | ICD-10-CM

## 2018-08-27 DIAGNOSIS — E10.8 UNCONTROLLED TYPE 1 DIABETES MELLITUS WITH COMPLICATION (HCC): ICD-10-CM

## 2018-08-27 DIAGNOSIS — E10.65 UNCONTROLLED TYPE 1 DIABETES MELLITUS WITH COMPLICATION (HCC): ICD-10-CM

## 2018-08-27 DIAGNOSIS — E10.65 UNCONTROLLED TYPE 1 DIABETES MELLITUS WITH COMPLICATION (HCC): Primary | ICD-10-CM

## 2018-08-27 LAB
ALBUMIN SERPL BCP-MCNC: 4.3 G/DL (ref 3.5–4.8)
ALBUMIN/GLOB SERPL: 1.4 {RATIO} (ref 1–2)
ALP SERPL-CCNC: 72 U/L (ref 32–100)
ALT SERPL-CCNC: 25 U/L (ref 17–63)
ANION GAP SERPL CALC-SCNC: 8 MMOL/L (ref 0–18)
AST SERPL-CCNC: 20 U/L (ref 15–41)
BILIRUB SERPL-MCNC: 0.9 MG/DL (ref 0.3–1.2)
BUN SERPL-MCNC: 13 MG/DL (ref 8–20)
BUN/CREAT SERPL: 11.2 (ref 10–20)
CALCIUM SERPL-MCNC: 9.4 MG/DL (ref 8.5–10.5)
CARTRIDGE LOT#: ABNORMAL NUMERIC
CHLORIDE SERPL-SCNC: 99 MMOL/L (ref 95–110)
CO2 SERPL-SCNC: 28 MMOL/L (ref 22–32)
CREAT SERPL-MCNC: 1.16 MG/DL (ref 0.5–1.5)
CREAT UR-MCNC: 38.8 MG/DL
GLOBULIN PLAS-MCNC: 3.1 G/DL (ref 2.5–3.7)
GLUCOSE BLOOD: 88
GLUCOSE SERPL-MCNC: 54 MG/DL (ref 70–99)
HEMOGLOBIN A1C: 9.8 % (ref 4.3–5.6)
MICROALBUMIN UR-MCNC: 3 MG/DL (ref 0–1.8)
MICROALBUMIN/CREAT UR: 77.3 MG/G{CREAT} (ref 0–20)
OSMOLALITY UR CALC.SUM OF ELEC: 278 MOSM/KG (ref 275–295)
PATIENT FASTING: YES
POTASSIUM SERPL-SCNC: 3.9 MMOL/L (ref 3.3–5.1)
PROT SERPL-MCNC: 7.4 G/DL (ref 5.9–8.4)
SODIUM SERPL-SCNC: 135 MMOL/L (ref 136–144)
TEST STRIP LOT #: NORMAL NUMERIC

## 2018-08-27 PROCEDURE — 80053 COMPREHEN METABOLIC PANEL: CPT

## 2018-08-27 PROCEDURE — 99214 OFFICE O/P EST MOD 30 MIN: CPT | Performed by: INTERNAL MEDICINE

## 2018-08-27 PROCEDURE — 36416 COLLJ CAPILLARY BLOOD SPEC: CPT | Performed by: INTERNAL MEDICINE

## 2018-08-27 PROCEDURE — 82962 GLUCOSE BLOOD TEST: CPT | Performed by: INTERNAL MEDICINE

## 2018-08-27 PROCEDURE — 99212 OFFICE O/P EST SF 10 MIN: CPT | Performed by: INTERNAL MEDICINE

## 2018-08-27 PROCEDURE — 82043 UR ALBUMIN QUANTITATIVE: CPT

## 2018-08-27 PROCEDURE — 82570 ASSAY OF URINE CREATININE: CPT

## 2018-08-27 PROCEDURE — 83036 HEMOGLOBIN GLYCOSYLATED A1C: CPT | Performed by: INTERNAL MEDICINE

## 2018-08-27 PROCEDURE — 36415 COLL VENOUS BLD VENIPUNCTURE: CPT

## 2018-08-27 RX ORDER — AMLODIPINE BESYLATE 5 MG/1
5 TABLET ORAL DAILY
Qty: 30 TABLET | Refills: 6 | Status: SHIPPED | OUTPATIENT
Start: 2018-08-27 | End: 2019-09-26

## 2018-08-27 NOTE — TELEPHONE ENCOUNTER
Lab calling with STAT lab - Low BG 54 at lab earlier today. RN spoke with patient. He is home and feeling \"fine\" now. Pt's BG is 140. Pt ate lunch as soon as he got home from lab.     Routed to [facility] as [facility]

## 2018-08-27 NOTE — PROGRESS NOTES
Name: Danny Haskins  Date: 8/27/2018    Referring Physician: No ref. provider found    HISTORY OF PRESENT ILLNESS   Danny Haskins is a 53year old male who presents for diabetes mellitus.   He has not been for routine follow up and BG levels have sign Rfl: 0  •  Irbesartan-Hydrochlorothiazide 300-12.5 MG Oral Tab, TAKE ONE TABLET BY MOUTH ONCE DAILY, Disp: 90 tablet, Rfl: 1  •  HUMALOG KWIKPEN 100 UNIT/ML Subcutaneous Solution Pen-injector, ADMINISTER 50 UNITS UNDER THE SKIN EVERY DAY AS DIRECTED, Disp: bowel sounds and no palpable masses in abdomen, organomegaly or tenderness   Musculoskeletal:  normal muscle strength and tone  Skin:  normal moisture and skin texture  Neuro:  sensory grossly intact and motor grossly intact  Psychiatric:  oriented to time

## 2018-08-30 ENCOUNTER — TELEPHONE (OUTPATIENT)
Dept: ENDOCRINOLOGY CLINIC | Facility: CLINIC | Age: 48
End: 2018-08-30

## 2018-08-30 NOTE — TELEPHONE ENCOUNTER
Please call patient - good news, renal function is stable on blood work however he has increasing protein in his urine. This is an indication that diabetes is affecting his kidneys and please continue to work on blood glucose control. Thanks.

## 2018-10-08 RX ORDER — ATORVASTATIN CALCIUM 40 MG/1
TABLET, FILM COATED ORAL
Qty: 30 TABLET | Refills: 5 | Status: SHIPPED | OUTPATIENT
Start: 2018-10-08 | End: 2019-10-25

## 2018-10-25 RX ORDER — INSULIN GLARGINE 100 [IU]/ML
INJECTION, SOLUTION SUBCUTANEOUS
Qty: 30 ML | Refills: 0 | Status: SHIPPED | OUTPATIENT
Start: 2018-10-25 | End: 2018-12-18

## 2018-11-08 ENCOUNTER — HOSPITAL ENCOUNTER (OUTPATIENT)
Age: 48
Discharge: HOME | End: 2018-11-08
Payer: COMMERCIAL

## 2018-11-08 VITALS
DIASTOLIC BLOOD PRESSURE: 86 MMHG | OXYGEN SATURATION: 96 % | HEART RATE: 88 BPM | SYSTOLIC BLOOD PRESSURE: 155 MMHG | TEMPERATURE: 98 F | RESPIRATION RATE: 16 BRPM

## 2018-11-08 DIAGNOSIS — J01.40 ACUTE NON-RECURRENT PANSINUSITIS: Primary | ICD-10-CM

## 2018-11-08 PROCEDURE — 99213 OFFICE O/P EST LOW 20 MIN: CPT

## 2018-11-08 PROCEDURE — 99214 OFFICE O/P EST MOD 30 MIN: CPT

## 2018-11-08 RX ORDER — AMOXICILLIN AND CLAVULANATE POTASSIUM 875; 125 MG/1; MG/1
1 TABLET, FILM COATED ORAL 2 TIMES DAILY
Qty: 20 TABLET | Refills: 0 | Status: SHIPPED | OUTPATIENT
Start: 2018-11-08 | End: 2018-11-18

## 2018-11-08 RX ORDER — FLUTICASONE PROPIONATE 50 MCG
2 SPRAY, SUSPENSION (ML) NASAL DAILY
Qty: 16 G | Refills: 0 | Status: SHIPPED | OUTPATIENT
Start: 2018-11-08 | End: 2018-12-08

## 2018-11-08 NOTE — ED INITIAL ASSESSMENT (HPI)
Cough with thick yellow and brown phlegm, sinus pressure, congestion, runny nose x 2 weeks. Denies fever.

## 2018-11-08 NOTE — ED PROVIDER NOTES
Patient presents with:  Cough/URI      HPI:     Geraldine Lynn is a 53year old male with a past history of hypertension and diabetes presents with sinus pressure, runny nose, headache over the course of the last 2 weeks.   Patient reports initially he th Patient has taken Augmentin in the past and has tolerated it without difficulty. Will place patient on Augmentin also instructed use Flonase. Patient to follow-up with his primary care provider and return for any other concerns.   Patient verbalized plan

## 2018-11-12 RX ORDER — PEN NEEDLE, DIABETIC 31 GX5/16"
NEEDLE, DISPOSABLE MISCELLANEOUS
Qty: 400 EACH | Refills: 1 | Status: SHIPPED | OUTPATIENT
Start: 2018-11-12 | End: 2019-08-29

## 2018-12-24 RX ORDER — INSULIN LISPRO 100 [IU]/ML
INJECTION, SOLUTION INTRAVENOUS; SUBCUTANEOUS
Qty: 30 ML | Refills: 5 | Status: SHIPPED | OUTPATIENT
Start: 2018-12-24 | End: 2020-03-02

## 2018-12-29 ENCOUNTER — HOSPITAL ENCOUNTER (OUTPATIENT)
Age: 48
Discharge: HOME | End: 2018-12-29
Attending: EMERGENCY MEDICINE
Payer: COMMERCIAL

## 2018-12-29 VITALS
BODY MASS INDEX: 30 KG/M2 | DIASTOLIC BLOOD PRESSURE: 95 MMHG | TEMPERATURE: 98 F | RESPIRATION RATE: 16 BRPM | HEART RATE: 91 BPM | SYSTOLIC BLOOD PRESSURE: 158 MMHG | WEIGHT: 220 LBS | OXYGEN SATURATION: 96 %

## 2018-12-29 DIAGNOSIS — J40 BRONCHITIS: ICD-10-CM

## 2018-12-29 DIAGNOSIS — K12.2 UVULITIS: Primary | ICD-10-CM

## 2018-12-29 PROCEDURE — 99214 OFFICE O/P EST MOD 30 MIN: CPT

## 2018-12-29 PROCEDURE — 99213 OFFICE O/P EST LOW 20 MIN: CPT

## 2018-12-29 RX ORDER — AMOXICILLIN 875 MG/1
875 TABLET, COATED ORAL 2 TIMES DAILY
Qty: 10 TABLET | Refills: 0 | Status: SHIPPED | OUTPATIENT
Start: 2018-12-29 | End: 2019-01-03

## 2018-12-29 RX ORDER — PREDNISONE 20 MG/1
40 TABLET ORAL DAILY
Qty: 6 TABLET | Refills: 0 | Status: SHIPPED | OUTPATIENT
Start: 2018-12-29 | End: 2019-01-01

## 2018-12-29 NOTE — ED INITIAL ASSESSMENT (HPI)
Cough productive sputum for 1 1/2 weeks with temp 99 that comes and goes- going to [place] after new year.  Here 11/8 with sinus infection and was put on amox and flonase

## 2018-12-29 NOTE — ED PROVIDER NOTES
Patient Seen in: [facility] Immediate Care In [address] [place] [address]    History   Patient presents with:  Cough/URI    Stated Complaint: uri    HPI    Patient here with cough, congestion for 7 days, swelling in back of throat. .  No travel, no known sick contact Smoking status: Never Smoker      Smokeless tobacco: Never Used    Alcohol use: Yes    Drug use: Not on file      Review of Systems    Positive for stated complaint: uri  Other systems are as noted in HPI. Constitutional and vital signs reviewed.       All tablets (40 mg total) by mouth daily for 3 days.   Qty: 6 tablet Refills: 0

## 2019-02-28 ENCOUNTER — OFFICE VISIT (OUTPATIENT)
Dept: ENDOCRINOLOGY CLINIC | Facility: CLINIC | Age: 49
End: 2019-02-28
Payer: COMMERCIAL

## 2019-02-28 VITALS
DIASTOLIC BLOOD PRESSURE: 78 MMHG | SYSTOLIC BLOOD PRESSURE: 120 MMHG | BODY MASS INDEX: 30 KG/M2 | HEART RATE: 103 BPM | WEIGHT: 222 LBS

## 2019-02-28 DIAGNOSIS — E10.65 UNCONTROLLED TYPE 1 DIABETES MELLITUS WITH HYPERGLYCEMIA (HCC): Primary | ICD-10-CM

## 2019-02-28 LAB
CARTRIDGE LOT#: ABNORMAL NUMERIC
GLUCOSE BLOOD: 63
HEMOGLOBIN A1C: 11.5 % (ref 4.3–5.6)
TEST STRIP LOT #: NORMAL NUMERIC

## 2019-02-28 PROCEDURE — 83036 HEMOGLOBIN GLYCOSYLATED A1C: CPT | Performed by: INTERNAL MEDICINE

## 2019-02-28 PROCEDURE — 99212 OFFICE O/P EST SF 10 MIN: CPT | Performed by: INTERNAL MEDICINE

## 2019-02-28 PROCEDURE — 99214 OFFICE O/P EST MOD 30 MIN: CPT | Performed by: INTERNAL MEDICINE

## 2019-02-28 PROCEDURE — 82962 GLUCOSE BLOOD TEST: CPT | Performed by: INTERNAL MEDICINE

## 2019-02-28 PROCEDURE — 36416 COLLJ CAPILLARY BLOOD SPEC: CPT | Performed by: INTERNAL MEDICINE

## 2019-02-28 RX ORDER — BLOOD-GLUCOSE SENSOR
1 EACH MISCELLANEOUS CONTINUOUS
Qty: 3 EACH | Refills: 11 | Status: SHIPPED | OUTPATIENT
Start: 2019-02-28

## 2019-02-28 RX ORDER — BLOOD-GLUCOSE,RECEIVER,CONT
1 EACH MISCELLANEOUS CONTINUOUS
Qty: 1 DEVICE | Refills: 0 | Status: SHIPPED | OUTPATIENT
Start: 2019-02-28

## 2019-02-28 RX ORDER — BLOOD-GLUCOSE TRANSMITTER
1 EACH MISCELLANEOUS CONTINUOUS
Qty: 1 EACH | Refills: 3 | Status: SHIPPED | OUTPATIENT
Start: 2019-02-28

## 2019-02-28 NOTE — PROGRESS NOTES
Name: David Acuna  Date: 2/28/2019    Referring Physician: No ref. provider found    HISTORY OF PRESENT ILLNESS   David Acuna is a 51year old male who presents for diabetes mellitus.   He has not been for routine follow up and BG levels have sign KWIKPEN 100 UNIT/ML Subcutaneous Solution Pen-injector, ADMINISTER 50 UNITS UNDER THE SKIN EVERY DAY AS DIRECTED, Disp: 30 mL, Rfl: 5  •  BD PEN NEEDLE SHORT U/F 31G X 8 MM Does not apply Misc, USE FOUR TIMES DAILY AS DIRECTED, Disp: 400 each, Rfl: 1  •  a developed, in no acute distress  Eyes:  normal conjunctivae, sclera. , normal sclera and normal pupils  Ears/Nose/Mouth/Throat/Neck:  no palpable thyroid nodules or cervical lymphadenopathy  Respiratory:  clear to auscultation bilaterally  Cardiovascular:

## 2019-05-10 ENCOUNTER — TELEPHONE (OUTPATIENT)
Dept: ENDOCRINOLOGY CLINIC | Facility: CLINIC | Age: 49
End: 2019-05-10

## 2019-05-13 ENCOUNTER — HOSPITAL ENCOUNTER (OUTPATIENT)
Age: 49
Discharge: HOME | End: 2019-05-13
Attending: EMERGENCY MEDICINE
Payer: COMMERCIAL

## 2019-05-13 VITALS
HEIGHT: 72 IN | DIASTOLIC BLOOD PRESSURE: 90 MMHG | RESPIRATION RATE: 18 BRPM | HEART RATE: 84 BPM | TEMPERATURE: 99 F | SYSTOLIC BLOOD PRESSURE: 162 MMHG | BODY MASS INDEX: 29.12 KG/M2 | OXYGEN SATURATION: 97 % | WEIGHT: 215 LBS

## 2019-05-13 DIAGNOSIS — J02.0 STREP PHARYNGITIS: Primary | ICD-10-CM

## 2019-05-13 PROCEDURE — 99214 OFFICE O/P EST MOD 30 MIN: CPT

## 2019-05-13 PROCEDURE — 99213 OFFICE O/P EST LOW 20 MIN: CPT

## 2019-05-13 PROCEDURE — 87430 STREP A AG IA: CPT

## 2019-05-13 RX ORDER — PENICILLIN V POTASSIUM 500 MG/1
500 TABLET ORAL 2 TIMES DAILY
Qty: 20 TABLET | Refills: 0 | Status: SHIPPED | OUTPATIENT
Start: 2019-05-13 | End: 2019-05-23

## 2019-05-13 RX ORDER — AMOXICILLIN 500 MG/1
500 TABLET, FILM COATED ORAL 3 TIMES DAILY
Qty: 30 TABLET | Refills: 0 | Status: SHIPPED | OUTPATIENT
Start: 2019-05-13 | End: 2019-05-13 | Stop reason: CLARIF

## 2019-05-14 NOTE — ED NOTES
C/o cold symptoms x 6 weeks with nasal congestion and scratchy throat. Pt had fevers early on but none recently. Throat is slightly red, tender with swallowing. C/o facial pain and pressure. No resp distress or sob. Lungs clear.

## 2019-05-14 NOTE — ED PROVIDER NOTES
Patient Seen in: [facility] Immediate Care In [address] [place] [address]    History   Patient presents with:  Cough/URI    Stated Complaint: sore throat, cough    HPI    6 weeks of URI symptoms with cough and congestion. Now c/o severe sore throat. No fever.      Pa muscle tone. Skin: Skin is warm and dry. Capillary refill takes less than 2 seconds. Psychiatric: He has a normal mood and affect. His behavior is normal.   Nursing note and vitals reviewed.            ED Course     Labs Reviewed   EMH POCT RAPID STREP

## 2019-06-07 ENCOUNTER — HOSPITAL ENCOUNTER (OUTPATIENT)
Age: 49
Discharge: HOME | End: 2019-06-07
Attending: EMERGENCY MEDICINE
Payer: COMMERCIAL

## 2019-06-07 VITALS
OXYGEN SATURATION: 97 % | HEIGHT: 72 IN | HEART RATE: 80 BPM | WEIGHT: 220 LBS | DIASTOLIC BLOOD PRESSURE: 83 MMHG | RESPIRATION RATE: 18 BRPM | SYSTOLIC BLOOD PRESSURE: 141 MMHG | BODY MASS INDEX: 29.8 KG/M2 | TEMPERATURE: 98 F

## 2019-06-07 DIAGNOSIS — J06.9 VIRAL UPPER RESPIRATORY TRACT INFECTION WITH COUGH: Primary | ICD-10-CM

## 2019-06-07 PROCEDURE — 99214 OFFICE O/P EST MOD 30 MIN: CPT

## 2019-06-07 PROCEDURE — 99213 OFFICE O/P EST LOW 20 MIN: CPT

## 2019-06-07 PROCEDURE — 87430 STREP A AG IA: CPT

## 2019-06-07 RX ORDER — BENZONATATE 200 MG/1
200 CAPSULE ORAL 3 TIMES DAILY PRN
Qty: 15 CAPSULE | Refills: 0 | Status: SHIPPED | OUTPATIENT
Start: 2019-06-07 | End: 2019-06-12

## 2019-06-07 NOTE — ED PROVIDER NOTES
Patient Seen in: [facility] Immediate Care In [address] [place] [address]    History   Patient presents with:  Sore Throat    Stated Complaint: sore throat    HPI  Mild runny nose, dry cough and scratchy throat of the last 4 days.   Cough is been disruptive with conv (Room air)       Current:BP (!) 162/93   Pulse 85   Temp 98.1 °F (36.7 °C) (Temporal)   Resp 18   Ht 182.9 cm (6')   Wt 99.8 kg   SpO2 97%   BMI 29.84 kg/m²         Physical Exam   Constitutional: He is oriented to person, place, and time.  He appears well- appointment as soon as possible for a visit in 1 week  For follow-up        Medications Prescribed:  Current Discharge Medication List    START taking these medications    benzonatate 200 MG Oral Cap  Take 1 capsule (200 mg total) by mouth 3 (three) times

## 2019-08-29 RX ORDER — PEN NEEDLE, DIABETIC 31 GX5/16"
NEEDLE, DISPOSABLE MISCELLANEOUS
Qty: 400 EACH | Refills: 0 | Status: SHIPPED | OUTPATIENT
Start: 2019-08-29 | End: 2019-12-26

## 2019-08-29 NOTE — TELEPHONE ENCOUNTER
LOV 2/28/19. RTC 2 months. No F/u. Pt no showed to apt 4/25/19. Called to schedule apt. Pt scheduled apt 10/25/19. Pt aware of new location. Pended 2 month supply.

## 2019-09-26 DIAGNOSIS — E10.65 UNCONTROLLED TYPE 1 DIABETES MELLITUS WITH COMPLICATION (HCC): ICD-10-CM

## 2019-09-26 DIAGNOSIS — E10.8 UNCONTROLLED TYPE 1 DIABETES MELLITUS WITH COMPLICATION (HCC): ICD-10-CM

## 2019-09-26 RX ORDER — IRBESARTAN AND HYDROCHLOROTHIAZIDE 300; 12.5 MG/1; MG/1
TABLET, FILM COATED ORAL
Qty: 90 TABLET | Refills: 0 | Status: SHIPPED | OUTPATIENT
Start: 2019-09-26 | End: 2020-03-02

## 2019-09-26 RX ORDER — AMLODIPINE BESYLATE 5 MG/1
TABLET ORAL
Qty: 90 TABLET | Refills: 0 | Status: SHIPPED | OUTPATIENT
Start: 2019-09-26 | End: 2020-03-02

## 2019-10-25 ENCOUNTER — OFFICE VISIT (OUTPATIENT)
Dept: ENDOCRINOLOGY CLINIC | Facility: CLINIC | Age: 49
End: 2019-10-25
Payer: COMMERCIAL

## 2019-10-25 VITALS
WEIGHT: 233 LBS | BODY MASS INDEX: 32 KG/M2 | DIASTOLIC BLOOD PRESSURE: 81 MMHG | SYSTOLIC BLOOD PRESSURE: 133 MMHG | HEART RATE: 94 BPM

## 2019-10-25 DIAGNOSIS — E10.65 UNCONTROLLED TYPE 1 DIABETES MELLITUS WITH HYPERGLYCEMIA (HCC): Primary | ICD-10-CM

## 2019-10-25 PROCEDURE — 99214 OFFICE O/P EST MOD 30 MIN: CPT | Performed by: INTERNAL MEDICINE

## 2019-10-25 PROCEDURE — 83036 HEMOGLOBIN GLYCOSYLATED A1C: CPT | Performed by: INTERNAL MEDICINE

## 2019-10-25 PROCEDURE — 36416 COLLJ CAPILLARY BLOOD SPEC: CPT | Performed by: INTERNAL MEDICINE

## 2019-10-25 PROCEDURE — 82962 GLUCOSE BLOOD TEST: CPT | Performed by: INTERNAL MEDICINE

## 2019-10-25 NOTE — PROGRESS NOTES
Name: Arias Kelly  Date: 10/25/2019    Referring Physician: No ref. provider found    HISTORY OF PRESENT ILLNESS   Arias Kelly is a 51year old male who presents for diabetes mellitus.   He has not been for routine follow up and BG levels have sig 0  •  BD PEN NEEDLE SHORT U/F 31G X 8 MM Does not apply Misc, USE FOUR TIMES DAILY AS DIRECTED, Disp: 400 each, Rfl: 0  •  Continuous Blood Gluc  (DEXCOM G6 ) Does not apply Device, 1 Device by Does not apply route continuous. , Disp: 1 Julee 2cups      Medical History:   Past Medical History:   Diagnosis Date   • Other and unspecified hyperlipidemia    • Pneumonia    • Type I (juvenile type) diabetes mellitus without mention of complication, not stated as uncontrolled    • Unspecified essentia TSH - he will go today  -Previously failed pump trial  -Normotensive - BP is significantly improved   -Discussed importance of compliance with atorvastatin    RTC 2 weeks with AWA, 3 months with MD     10/25/2019  Yash Paulson MD

## 2019-10-26 RX ORDER — ATORVASTATIN CALCIUM 40 MG/1
TABLET, FILM COATED ORAL
Qty: 30 TABLET | Refills: 0 | Status: SHIPPED | OUTPATIENT
Start: 2019-10-26 | End: 2020-03-02

## 2019-10-28 ENCOUNTER — APPOINTMENT (OUTPATIENT)
Dept: LAB | Facility: HOSPITAL | Age: 49
End: 2019-10-28
Attending: INTERNAL MEDICINE
Payer: COMMERCIAL

## 2019-10-28 DIAGNOSIS — E10.65 UNCONTROLLED TYPE 1 DIABETES MELLITUS WITH HYPERGLYCEMIA (HCC): ICD-10-CM

## 2019-10-28 PROCEDURE — 80061 LIPID PANEL: CPT

## 2019-10-28 PROCEDURE — 80053 COMPREHEN METABOLIC PANEL: CPT

## 2019-10-28 PROCEDURE — 82570 ASSAY OF URINE CREATININE: CPT

## 2019-10-28 PROCEDURE — 36415 COLL VENOUS BLD VENIPUNCTURE: CPT

## 2019-10-28 PROCEDURE — 82043 UR ALBUMIN QUANTITATIVE: CPT

## 2019-10-28 PROCEDURE — 84443 ASSAY THYROID STIM HORMONE: CPT

## 2019-10-30 ENCOUNTER — TELEPHONE (OUTPATIENT)
Dept: ENDOCRINOLOGY CLINIC | Facility: CLINIC | Age: 49
End: 2019-10-30

## 2019-10-30 NOTE — TELEPHONE ENCOUNTER
[facility] understanding of lab results and will work on improving BG control.

## 2019-10-30 NOTE — TELEPHONE ENCOUNTER
Please call patient - labs do demonstrate well controlled cholesterol levels, normal thyroid levels. Normal kidney and liver function. However he does have increasing protein in the urine which is an early sign of diabetic damage to kidney.   Please les

## 2019-11-08 ENCOUNTER — NURSE ONLY (OUTPATIENT)
Dept: ENDOCRINOLOGY CLINIC | Facility: CLINIC | Age: 49
End: 2019-11-08
Payer: COMMERCIAL

## 2019-11-08 DIAGNOSIS — E10.65 UNCONTROLLED TYPE 1 DIABETES MELLITUS WITH HYPERGLYCEMIA (HCC): Primary | ICD-10-CM

## 2019-11-08 PROCEDURE — 99211 OFF/OP EST MAY X REQ PHY/QHP: CPT | Performed by: INTERNAL MEDICINE

## 2019-11-08 NOTE — PROGRESS NOTES
Dontrell Oneill was seen for CGM follow up: [employer] professional CGM/ Hypoglycemia     Date: 11/8/2019             Start time: 8:40:AM  End time: 9:05:AM     Presents for personal CGM.  He has just started Dexcom about two weeks ago and returns tod

## 2019-11-11 ENCOUNTER — TELEPHONE (OUTPATIENT)
Dept: ENDOCRINOLOGY CLINIC | Facility: CLINIC | Age: 49
End: 2019-11-11

## 2019-11-11 RX ORDER — INSULIN LISPRO 100 [IU]/ML
20 INJECTION, SOLUTION INTRAVENOUS; SUBCUTANEOUS
Qty: 63 ML | Refills: 1 | Status: SHIPPED | OUTPATIENT
Start: 2019-11-11 | End: 2020-02-09

## 2019-11-11 NOTE — TELEPHONE ENCOUNTER
Rita/Pharmacy calling for mutual patient regarding new script rx:Humalog insulin Pen, need updated scrip, patient using more units then prescribed. Please call at:[phone],thanks.     Current Outpatient Medications:   •  HUMALOG KWIKPEN 100 UNIT/ML

## 2019-12-26 RX ORDER — PEN NEEDLE, DIABETIC 31 GX5/16"
NEEDLE, DISPOSABLE MISCELLANEOUS
Qty: 400 EACH | Refills: 0 | Status: SHIPPED | OUTPATIENT
Start: 2019-12-26 | End: 2020-10-27

## 2020-02-26 ENCOUNTER — OFFICE VISIT (OUTPATIENT)
Dept: DERMATOLOGY CLINIC | Facility: CLINIC | Age: 50
End: 2020-02-26
Payer: COMMERCIAL

## 2020-02-26 DIAGNOSIS — D23.4 BENIGN NEOPLASM OF SCALP AND SKIN OF NECK: ICD-10-CM

## 2020-02-26 DIAGNOSIS — D23.30 BENIGN NEOPLASM OF SKIN OF FACE: Primary | ICD-10-CM

## 2020-02-26 DIAGNOSIS — L72.0 EPIDERMAL CYST: ICD-10-CM

## 2020-02-26 PROCEDURE — 99202 OFFICE O/P NEW SF 15 MIN: CPT | Performed by: DERMATOLOGY

## 2020-03-02 DIAGNOSIS — E10.65 UNCONTROLLED TYPE 1 DIABETES MELLITUS WITH COMPLICATION (HCC): ICD-10-CM

## 2020-03-02 DIAGNOSIS — E10.8 UNCONTROLLED TYPE 1 DIABETES MELLITUS WITH COMPLICATION (HCC): ICD-10-CM

## 2020-03-02 RX ORDER — ATORVASTATIN CALCIUM 40 MG/1
40 TABLET, FILM COATED ORAL NIGHTLY
Qty: 30 TABLET | Refills: 0 | Status: SHIPPED | OUTPATIENT
Start: 2020-03-02 | End: 2020-11-11

## 2020-03-02 RX ORDER — AMLODIPINE BESYLATE 5 MG/1
TABLET ORAL
Qty: 90 TABLET | Refills: 0 | Status: SHIPPED | OUTPATIENT
Start: 2020-03-02 | End: 2020-05-15

## 2020-03-02 RX ORDER — INSULIN LISPRO 100 [IU]/ML
INJECTION, SOLUTION INTRAVENOUS; SUBCUTANEOUS
Qty: 30 ML | Refills: 5 | Status: SHIPPED | OUTPATIENT
Start: 2020-03-02 | End: 2020-07-01

## 2020-03-02 RX ORDER — IRBESARTAN AND HYDROCHLOROTHIAZIDE 300; 12.5 MG/1; MG/1
TABLET, FILM COATED ORAL
Qty: 90 TABLET | Refills: 0 | Status: SHIPPED | OUTPATIENT
Start: 2020-03-02 | End: 2020-05-15

## 2020-03-08 NOTE — PROGRESS NOTES
Ginny Parish is a 53year old male. HPI:     CC:  Patient presents with:  Lesion: New pt. pt presenting today with mutiple lesions to L lower neck for 3 months. pt staes hair follicles have been infected. Lesions has changed in size. Denies family and each 0   • Irbesartan-hydroCHLOROthiazide 300-12.5 MG Oral Tab TAKE 1 TABLET BY MOUTH EVERY DAY 90 tablet 0   • insulin glargine (LANTUS SOLOSTAR) 100 UNIT/ML Subcutaneous Solution Pen-injector ADMINISTER 52 UNITS UNDER THE SKIN EVERY DAY AS DIRECTED 30 mL of current or ex partner: Not on file        Emotionally abused: Not on file        Physically abused: Not on file        Forced sexual activity: Not on file    Other Topics      Concerns:         Service: Not Asked        Blood Transfusions: Not A scalp, head, neck, face,nails, hair, external eyes, including conjunctival mucosa, eyelids, lips external ears , arms, digits,palms.      Multiple light to medium brown, well marginated, uniformly pigmented, macules and papules 6 mm and less scattered on ex indicates understanding of these issues and agrees to the plan. The patient is asked to return as noted in follow-up/ above. This note was generated using Dragon voice recognition software.   Please contact me regarding any confusion resulting from erro

## 2020-04-23 ENCOUNTER — VIRTUAL PHONE E/M (OUTPATIENT)
Dept: ENDOCRINOLOGY CLINIC | Facility: CLINIC | Age: 50
End: 2020-04-23
Payer: COMMERCIAL

## 2020-04-23 DIAGNOSIS — E10.65 UNCONTROLLED TYPE 1 DIABETES MELLITUS WITH HYPERGLYCEMIA (HCC): Primary | ICD-10-CM

## 2020-04-23 PROCEDURE — 99213 OFFICE O/P EST LOW 20 MIN: CPT | Performed by: INTERNAL MEDICINE

## 2020-04-23 NOTE — PROGRESS NOTES
Virtual Telephone Check-In    [facility] verbally consents to a Virtual/Telephone Check-In visit on 04/23/20. Patient understands and accepts financial responsibility for any deductible, co-insurance and/or co-pays associated with this service. No    Thyroid disease: No      Medications:     Current Outpatient Medications:   •  amLODIPine Besylate 5 MG Oral Tab, TAKE 1 TABLET(5 MG) BY MOUTH DAILY, Disp: 90 tablet, Rfl: 0  •  atorvastatin 40 MG Oral Tab, Take 1 tablet (40 mg total) by mouth nightl Highest education level: Not on file    Tobacco Use      Smoking status: Never Smoker      Smokeless tobacco: Never Used    Substance and Sexual Activity      Alcohol use: Yes    Other Topics      Concerns:        Caffeine Concern: Yes          2cups video communication. This has been done in good sherlyn to provide continuity of care in the best interest of the provider-patient relationship, due to the ongoing public health crisis/national emergency and because of restrictions of visitation.   There are

## 2020-05-15 DIAGNOSIS — E10.65 UNCONTROLLED TYPE 1 DIABETES MELLITUS WITH COMPLICATION (HCC): ICD-10-CM

## 2020-05-15 DIAGNOSIS — E10.8 UNCONTROLLED TYPE 1 DIABETES MELLITUS WITH COMPLICATION (HCC): ICD-10-CM

## 2020-05-15 RX ORDER — IRBESARTAN AND HYDROCHLOROTHIAZIDE 300; 12.5 MG/1; MG/1
TABLET, FILM COATED ORAL
Qty: 90 TABLET | Refills: 0 | Status: SHIPPED | OUTPATIENT
Start: 2020-05-15 | End: 2020-09-30

## 2020-05-15 RX ORDER — AMLODIPINE BESYLATE 5 MG/1
TABLET ORAL
Qty: 90 TABLET | Refills: 0 | Status: SHIPPED | OUTPATIENT
Start: 2020-05-15 | End: 2020-09-30

## 2020-07-01 ENCOUNTER — TELEPHONE (OUTPATIENT)
Dept: ENDOCRINOLOGY CLINIC | Facility: CLINIC | Age: 50
End: 2020-07-01

## 2020-07-01 RX ORDER — INSULIN LISPRO 100 [IU]/ML
INJECTION, SOLUTION INTRAVENOUS; SUBCUTANEOUS
Qty: 15 ML | Refills: 0 | Status: SHIPPED | OUTPATIENT
Start: 2020-07-01 | End: 2020-10-07

## 2020-07-01 NOTE — TELEPHONE ENCOUNTER
LOV 4/23/20. RTC 3 months. No F/u. Called to book apt with CHRISTINA. RESHMATCB. Pended 3 month supply.

## 2020-07-15 ENCOUNTER — TELEPHONE (OUTPATIENT)
Dept: INTERNAL MEDICINE CLINIC | Facility: CLINIC | Age: 50
End: 2020-07-15

## 2020-07-15 NOTE — TELEPHONE ENCOUNTER
Called pt to inform that his diabetic labs were in the system and he needed to complete them  Pt verbalized understanding

## 2020-09-28 DIAGNOSIS — E10.65 UNCONTROLLED TYPE 1 DIABETES MELLITUS WITH COMPLICATION (HCC): ICD-10-CM

## 2020-09-28 DIAGNOSIS — E10.8 UNCONTROLLED TYPE 1 DIABETES MELLITUS WITH COMPLICATION (HCC): ICD-10-CM

## 2020-10-01 RX ORDER — AMLODIPINE BESYLATE 5 MG/1
TABLET ORAL
Qty: 90 TABLET | Refills: 0 | Status: SHIPPED | OUTPATIENT
Start: 2020-10-01 | End: 2021-02-22

## 2020-10-01 RX ORDER — IRBESARTAN AND HYDROCHLOROTHIAZIDE 300; 12.5 MG/1; MG/1
TABLET, FILM COATED ORAL
Qty: 90 TABLET | Refills: 0 | Status: SHIPPED | OUTPATIENT
Start: 2020-10-01 | End: 2020-12-17

## 2020-10-07 RX ORDER — INSULIN LISPRO 100 [IU]/ML
INJECTION, SOLUTION INTRAVENOUS; SUBCUTANEOUS
Qty: 21 ML | Refills: 0 | Status: SHIPPED | OUTPATIENT
Start: 2020-10-07 | End: 2021-02-04

## 2020-10-27 RX ORDER — PEN NEEDLE, DIABETIC 31 GX5/16"
NEEDLE, DISPOSABLE MISCELLANEOUS
Qty: 400 EACH | Refills: 0 | Status: SHIPPED | OUTPATIENT
Start: 2020-10-27 | End: 2021-02-25

## 2020-11-11 RX ORDER — ATORVASTATIN CALCIUM 40 MG/1
TABLET, FILM COATED ORAL
Qty: 90 TABLET | Refills: 1 | Status: SHIPPED | OUTPATIENT
Start: 2020-11-11 | End: 2020-12-30

## 2020-12-16 DIAGNOSIS — E10.8 UNCONTROLLED TYPE 1 DIABETES MELLITUS WITH COMPLICATION (HCC): ICD-10-CM

## 2020-12-16 DIAGNOSIS — E10.65 UNCONTROLLED TYPE 1 DIABETES MELLITUS WITH COMPLICATION (HCC): ICD-10-CM

## 2020-12-17 RX ORDER — IRBESARTAN AND HYDROCHLOROTHIAZIDE 300; 12.5 MG/1; MG/1
TABLET, FILM COATED ORAL
Qty: 90 TABLET | Refills: 3 | Status: SHIPPED | OUTPATIENT
Start: 2020-12-17

## 2020-12-29 NOTE — TELEPHONE ENCOUNTER
Last filled: 11/11/20  LOV: 4/23/20 virtual   Future Appointments   Date Time Provider Beau Monreal   2/4/2021 10:45 AM Silke Mcmillan MD [facility]     RTC: 3 mo.

## 2020-12-30 RX ORDER — ATORVASTATIN CALCIUM 40 MG/1
TABLET, FILM COATED ORAL
Qty: 102 TABLET | Refills: 2 | Status: SHIPPED | OUTPATIENT
Start: 2020-12-30 | End: 2021-10-04

## 2021-02-04 ENCOUNTER — LAB ENCOUNTER (OUTPATIENT)
Dept: LAB | Facility: HOSPITAL | Age: 51
End: 2021-02-04
Attending: INTERNAL MEDICINE
Payer: COMMERCIAL

## 2021-02-04 ENCOUNTER — OFFICE VISIT (OUTPATIENT)
Dept: ENDOCRINOLOGY CLINIC | Facility: CLINIC | Age: 51
End: 2021-02-04
Payer: COMMERCIAL

## 2021-02-04 VITALS
BODY MASS INDEX: 31 KG/M2 | HEART RATE: 87 BPM | SYSTOLIC BLOOD PRESSURE: 145 MMHG | WEIGHT: 231 LBS | DIASTOLIC BLOOD PRESSURE: 87 MMHG

## 2021-02-04 DIAGNOSIS — E10.8 UNCONTROLLED TYPE 1 DIABETES MELLITUS WITH COMPLICATION (HCC): ICD-10-CM

## 2021-02-04 DIAGNOSIS — E10.65 UNCONTROLLED TYPE 1 DIABETES MELLITUS WITH COMPLICATION (HCC): ICD-10-CM

## 2021-02-04 DIAGNOSIS — E10.65 UNCONTROLLED TYPE 1 DIABETES MELLITUS WITH HYPERGLYCEMIA (HCC): ICD-10-CM

## 2021-02-04 DIAGNOSIS — E10.8 UNCONTROLLED TYPE 1 DIABETES MELLITUS WITH COMPLICATION (HCC): Primary | ICD-10-CM

## 2021-02-04 DIAGNOSIS — E10.65 UNCONTROLLED TYPE 1 DIABETES MELLITUS WITH COMPLICATION (HCC): Primary | ICD-10-CM

## 2021-02-04 LAB
ALBUMIN SERPL-MCNC: 4 G/DL (ref 3.4–5)
ALBUMIN/GLOB SERPL: 1 {RATIO} (ref 1–2)
ALP LIVER SERPL-CCNC: 113 U/L
ALT SERPL-CCNC: 31 U/L
ANION GAP SERPL CALC-SCNC: 4 MMOL/L (ref 0–18)
AST SERPL-CCNC: 14 U/L (ref 15–37)
BILIRUB SERPL-MCNC: 0.8 MG/DL (ref 0.1–2)
BUN BLD-MCNC: 20 MG/DL (ref 7–18)
BUN/CREAT SERPL: 14.4 (ref 10–20)
CALCIUM BLD-MCNC: 9.2 MG/DL (ref 8.5–10.1)
CARTRIDGE LOT#: ABNORMAL NUMERIC
CHLORIDE SERPL-SCNC: 104 MMOL/L (ref 98–112)
CHOLEST SMN-MCNC: 215 MG/DL (ref ?–200)
CO2 SERPL-SCNC: 29 MMOL/L (ref 21–32)
CREAT BLD-MCNC: 1.39 MG/DL
CREAT UR-SCNC: 65 MG/DL
EST. AVERAGE GLUCOSE BLD GHB EST-MCNC: 263 MG/DL (ref 68–126)
GLOBULIN PLAS-MCNC: 3.9 G/DL (ref 2.8–4.4)
GLUCOSE BLD-MCNC: 144 MG/DL (ref 70–99)
GLUCOSE BLOOD: 150
HBA1C MFR BLD HPLC: 10.8 % (ref ?–5.7)
HDLC SERPL-MCNC: 44 MG/DL (ref 40–59)
HEMOGLOBIN A1C: 10.5 % (ref 4.3–5.6)
LDLC SERPL CALC-MCNC: 142 MG/DL (ref ?–100)
M PROTEIN MFR SERPL ELPH: 7.9 G/DL (ref 6.4–8.2)
MICROALBUMIN UR-MCNC: 19.2 MG/DL
MICROALBUMIN/CREAT 24H UR-RTO: 295.4 UG/MG (ref ?–30)
NONHDLC SERPL-MCNC: 171 MG/DL (ref ?–130)
OSMOLALITY SERPL CALC.SUM OF ELEC: 289 MOSM/KG (ref 275–295)
PATIENT FASTING Y/N/NP: YES
PATIENT FASTING Y/N/NP: YES
POTASSIUM SERPL-SCNC: 4.3 MMOL/L (ref 3.5–5.1)
SODIUM SERPL-SCNC: 137 MMOL/L (ref 136–145)
TEST STRIP LOT #: NORMAL NUMERIC
TRIGL SERPL-MCNC: 144 MG/DL (ref 30–149)
TSI SER-ACNC: 3.04 MIU/ML (ref 0.36–3.74)
VLDLC SERPL CALC-MCNC: 29 MG/DL (ref 0–30)

## 2021-02-04 PROCEDURE — 3046F HEMOGLOBIN A1C LEVEL >9.0%: CPT | Performed by: INTERNAL MEDICINE

## 2021-02-04 PROCEDURE — 36415 COLL VENOUS BLD VENIPUNCTURE: CPT

## 2021-02-04 PROCEDURE — 3061F NEG MICROALBUMINURIA REV: CPT | Performed by: INTERNAL MEDICINE

## 2021-02-04 PROCEDURE — 82947 ASSAY GLUCOSE BLOOD QUANT: CPT | Performed by: INTERNAL MEDICINE

## 2021-02-04 PROCEDURE — 3060F POS MICROALBUMINURIA REV: CPT | Performed by: INTERNAL MEDICINE

## 2021-02-04 PROCEDURE — 36416 COLLJ CAPILLARY BLOOD SPEC: CPT | Performed by: INTERNAL MEDICINE

## 2021-02-04 PROCEDURE — 83036 HEMOGLOBIN GLYCOSYLATED A1C: CPT

## 2021-02-04 PROCEDURE — 99214 OFFICE O/P EST MOD 30 MIN: CPT | Performed by: INTERNAL MEDICINE

## 2021-02-04 PROCEDURE — 3079F DIAST BP 80-89 MM HG: CPT | Performed by: INTERNAL MEDICINE

## 2021-02-04 PROCEDURE — 83036 HEMOGLOBIN GLYCOSYLATED A1C: CPT | Performed by: INTERNAL MEDICINE

## 2021-02-04 PROCEDURE — 80053 COMPREHEN METABOLIC PANEL: CPT

## 2021-02-04 PROCEDURE — 82570 ASSAY OF URINE CREATININE: CPT

## 2021-02-04 PROCEDURE — 80061 LIPID PANEL: CPT

## 2021-02-04 PROCEDURE — 84443 ASSAY THYROID STIM HORMONE: CPT

## 2021-02-04 PROCEDURE — 82043 UR ALBUMIN QUANTITATIVE: CPT

## 2021-02-04 PROCEDURE — 3077F SYST BP >= 140 MM HG: CPT | Performed by: INTERNAL MEDICINE

## 2021-02-04 RX ORDER — INSULIN LISPRO 100 [IU]/ML
INJECTION, SOLUTION INTRAVENOUS; SUBCUTANEOUS
Qty: 15 ML | Refills: 0 | Status: SHIPPED | OUTPATIENT
Start: 2021-02-04 | End: 2021-02-25

## 2021-02-04 RX ORDER — MELOXICAM 7.5 MG/1
TABLET ORAL
COMMUNITY
Start: 2021-01-28 | End: 2022-01-25

## 2021-02-04 NOTE — PROGRESS NOTES
Name: Rupert Marquez  Date: 2/4/2021      HISTORY OF PRESENT ILLNESS   Rupert Marquez is a 53year old male who presents for diabetes mellitus.   He has not been for routine follow up and BG levels have significantly increased since last visit, unclear IRBESARTAN-HYDROCHLOROTHIAZIDE 300-12.5 MG Oral Tab, TAKE 1 TABLET BY MOUTH  DAILY, Disp: 90 tablet, Rfl: 3  •  Insulin Lispro, 1 Unit Dial, (HUMALOG KWIKPEN) 100 UNIT/ML Subcutaneous Solution Pen-injector, INJECT SUBCUTANEOUSLY 12-20 UNITS 3 TIMES DAILY W file      Years of education: Not on file      Highest education level: Not on file    Tobacco Use      Smoking status: Never Smoker      Smokeless tobacco: Never Used    Substance and Sexual Activity      Alcohol use: Yes    Other Topics      Concerns: Lantus 52 units SQ daily  -Continue current dose of Humalog - discussed importance of compliance with medication   -Restart Dexcom CGM during office visit  -Will download tracings in 10 days to adjust Humalog  -Recheck lipids, MAB, CMP, TSH today  -Previou

## 2021-02-05 ENCOUNTER — TELEPHONE (OUTPATIENT)
Dept: ENDOCRINOLOGY CLINIC | Facility: CLINIC | Age: 51
End: 2021-02-05

## 2021-02-05 NOTE — TELEPHONE ENCOUNTER
Please call patient - his labs indicate a decline in kidney function and increased protein in the urine. It is important that he work on blood glucose control to prevent further damage to his kidneys.   In addition his cholesterol levels are elevated - is

## 2021-02-05 NOTE — TELEPHONE ENCOUNTER
Dr. Pedrito Hoyt, Sujit Rudolph)  Spoke to patient to relay message below. Patient stated understanding and will work on his blood glucose control.   Patient states he has not been taking atorvastatin but will begin   Thanks

## 2021-02-22 RX ORDER — AMLODIPINE BESYLATE 5 MG/1
TABLET ORAL
Qty: 90 TABLET | Refills: 3 | Status: SHIPPED | OUTPATIENT
Start: 2021-02-22 | End: 2021-09-02

## 2021-02-25 RX ORDER — INSULIN LISPRO 100 [IU]/ML
INJECTION, SOLUTION INTRAVENOUS; SUBCUTANEOUS
Qty: 54 ML | Refills: 1 | Status: SHIPPED | OUTPATIENT
Start: 2021-02-25 | End: 2021-06-01

## 2021-02-25 RX ORDER — INSULIN LISPRO 100 [IU]/ML
INJECTION, SOLUTION INTRAVENOUS; SUBCUTANEOUS
Qty: 15 ML | Refills: 0 | Status: SHIPPED | OUTPATIENT
Start: 2021-02-25 | End: 2021-10-08

## 2021-02-28 RX ORDER — PEN NEEDLE, DIABETIC 31 GX5/16"
1 NEEDLE, DISPOSABLE MISCELLANEOUS 4 TIMES DAILY
Qty: 400 EACH | Refills: 0 | Status: SHIPPED | OUTPATIENT
Start: 2021-02-28 | End: 2022-02-03

## 2021-03-03 RX ORDER — INSULIN GLARGINE 100 [IU]/ML
INJECTION, SOLUTION SUBCUTANEOUS
Qty: 48 ML | Refills: 1 | Status: SHIPPED | OUTPATIENT
Start: 2021-03-03 | End: 2021-07-16

## 2021-03-17 ENCOUNTER — IMMUNIZATION (OUTPATIENT)
Dept: LAB | Facility: HOSPITAL | Age: 51
End: 2021-03-17
Attending: HOSPITALIST
Payer: COMMERCIAL

## 2021-03-17 DIAGNOSIS — Z23 NEED FOR VACCINATION: Primary | ICD-10-CM

## 2021-03-17 PROCEDURE — 0011A SARSCOV2 VAC 100MCG/0.5ML IM: CPT

## 2021-04-14 ENCOUNTER — IMMUNIZATION (OUTPATIENT)
Dept: LAB | Facility: HOSPITAL | Age: 51
End: 2021-04-14
Attending: EMERGENCY MEDICINE
Payer: COMMERCIAL

## 2021-04-14 DIAGNOSIS — Z23 NEED FOR VACCINATION: Primary | ICD-10-CM

## 2021-04-14 PROCEDURE — 0012A SARSCOV2 VAC 100MCG/0.5ML IM: CPT

## 2021-06-01 NOTE — TELEPHONE ENCOUNTER
LOV: 02/04/21    Future Appointments   Date Time Provider Beau Villaltai   9/2/2021  1:45 PM Laura Jeffers MD [facility]     Per LOV note:     Medications for DM  Lantus 52 units SQ daily  Humalog 12-16 units SQ TID with meals plus CF; avg 15  Sen

## 2021-06-02 RX ORDER — INSULIN LISPRO 100 [IU]/ML
INJECTION, SOLUTION INTRAVENOUS; SUBCUTANEOUS
Qty: 75 ML | Refills: 1 | Status: SHIPPED | OUTPATIENT
Start: 2021-06-02 | End: 2021-10-08

## 2021-06-10 ENCOUNTER — OFFICE VISIT (OUTPATIENT)
Dept: OTOLARYNGOLOGY | Facility: CLINIC | Age: 51
End: 2021-06-10
Payer: COMMERCIAL

## 2021-06-10 VITALS
DIASTOLIC BLOOD PRESSURE: 70 MMHG | HEIGHT: 72 IN | TEMPERATURE: 98 F | BODY MASS INDEX: 29.8 KG/M2 | WEIGHT: 220 LBS | SYSTOLIC BLOOD PRESSURE: 122 MMHG

## 2021-06-10 DIAGNOSIS — G47.33 OSA (OBSTRUCTIVE SLEEP APNEA): Primary | ICD-10-CM

## 2021-06-10 DIAGNOSIS — J34.2 DEVIATED NASAL SEPTUM: ICD-10-CM

## 2021-06-10 PROCEDURE — 3008F BODY MASS INDEX DOCD: CPT | Performed by: OTOLARYNGOLOGY

## 2021-06-10 PROCEDURE — 99203 OFFICE O/P NEW LOW 30 MIN: CPT | Performed by: OTOLARYNGOLOGY

## 2021-06-10 PROCEDURE — 3078F DIAST BP <80 MM HG: CPT | Performed by: OTOLARYNGOLOGY

## 2021-06-10 PROCEDURE — 3074F SYST BP LT 130 MM HG: CPT | Performed by: OTOLARYNGOLOGY

## 2021-06-10 RX ORDER — AZELASTINE 1 MG/ML
2 SPRAY, METERED NASAL 2 TIMES DAILY
Qty: 30 ML | Refills: 0 | Status: SHIPPED | OUTPATIENT
Start: 2021-06-10 | End: 2022-01-25

## 2021-06-10 RX ORDER — LORATADINE 10 MG/1
10 TABLET ORAL DAILY
Qty: 30 TABLET | Refills: 3 | Status: SHIPPED | OUTPATIENT
Start: 2021-06-10 | End: 2022-01-25

## 2021-06-10 RX ORDER — MONTELUKAST SODIUM 10 MG/1
10 TABLET ORAL NIGHTLY
Qty: 30 TABLET | Refills: 3 | Status: SHIPPED | OUTPATIENT
Start: 2021-06-10 | End: 2022-01-25

## 2021-06-10 NOTE — PROGRESS NOTES
Sherrie Marrero is a 49year old male. Patient presents with:  Snoring: pt presents today due to snoring, possible sleep apnea. Sinus Problem: c/o having frequent sinus infections.        HISTORY OF PRESENT ILLNESS  He presents with a history of recurr syncope. GI Negative Abdominal pain and diarrhea. Endocrine Negative Cold intolerance and heat intolerance. Neuro Negative Tremors. Psych Negative Anxiety and depression. Integumentary Negative Frequent skin infections, pigment change and rash. Take 1 tablet (10 mg total) by mouth nightly., Disp: 30 tablet, Rfl: 3  •  loratadine 10 MG Oral Tab, Take 1 tablet (10 mg total) by mouth daily. , Disp: 30 tablet, Rfl: 3  •  Azelastine HCl 0.1 % Nasal Solution, 2 sprays by Nasal route 2 (two) times daily. 1 Device by Does not apply route continuous. (Patient not taking: Reported on 2/4/2021 ), Disp: 3 each, Rfl: 11  •  Continuous Blood Gluc Transmit (DEXCOM G6 TRANSMITTER) Does not apply Misc, 1 Device by Does not apply route continuous.  (Patient not taking

## 2021-07-16 RX ORDER — INSULIN GLARGINE 100 [IU]/ML
INJECTION, SOLUTION SUBCUTANEOUS
Qty: 48 ML | Refills: 1 | Status: SHIPPED | OUTPATIENT
Start: 2021-07-16 | End: 2021-10-16

## 2021-09-02 ENCOUNTER — OFFICE VISIT (OUTPATIENT)
Dept: ENDOCRINOLOGY CLINIC | Facility: CLINIC | Age: 51
End: 2021-09-02
Payer: COMMERCIAL

## 2021-09-02 VITALS
HEART RATE: 93 BPM | BODY MASS INDEX: 31 KG/M2 | SYSTOLIC BLOOD PRESSURE: 151 MMHG | WEIGHT: 226 LBS | DIASTOLIC BLOOD PRESSURE: 89 MMHG

## 2021-09-02 DIAGNOSIS — I10 HYPERTENSION, UNSPECIFIED TYPE: ICD-10-CM

## 2021-09-02 DIAGNOSIS — E10.8 UNCONTROLLED TYPE 1 DIABETES MELLITUS WITH COMPLICATION (HCC): Primary | ICD-10-CM

## 2021-09-02 DIAGNOSIS — E10.65 UNCONTROLLED TYPE 1 DIABETES MELLITUS WITH COMPLICATION (HCC): Primary | ICD-10-CM

## 2021-09-02 LAB
CARTRIDGE LOT#: ABNORMAL NUMERIC
GLUCOSE BLOOD: 179
HEMOGLOBIN A1C: 10.1 % (ref 4.3–5.6)
TEST STRIP LOT #: NORMAL NUMERIC

## 2021-09-02 PROCEDURE — 82947 ASSAY GLUCOSE BLOOD QUANT: CPT | Performed by: INTERNAL MEDICINE

## 2021-09-02 PROCEDURE — 99214 OFFICE O/P EST MOD 30 MIN: CPT | Performed by: INTERNAL MEDICINE

## 2021-09-02 PROCEDURE — 83036 HEMOGLOBIN GLYCOSYLATED A1C: CPT | Performed by: INTERNAL MEDICINE

## 2021-09-02 PROCEDURE — 3077F SYST BP >= 140 MM HG: CPT | Performed by: INTERNAL MEDICINE

## 2021-09-02 PROCEDURE — 3079F DIAST BP 80-89 MM HG: CPT | Performed by: INTERNAL MEDICINE

## 2021-09-02 RX ORDER — AMLODIPINE BESYLATE 10 MG/1
10 TABLET ORAL DAILY
Qty: 90 TABLET | Refills: 1 | Status: SHIPPED | OUTPATIENT
Start: 2021-09-02 | End: 2021-11-17

## 2021-09-02 NOTE — PROGRESS NOTES
Name: Pedro Barrera  Date: 9/2/2021      HISTORY OF PRESENT ILLNESS   Pedro Barrera is a 49year old male who presents for diabetes mellitus.   He has not been for routine follow up and BG levels have significantly increased since last visit, unclear MG Oral Tab, Take 1 tablet (10 mg total) by mouth daily. , Disp: 30 tablet, Rfl: 3  •  Azelastine HCl 0.1 % Nasal Solution, 2 sprays by Nasal route 2 (two) times daily. , Disp: 30 mL, Rfl: 0  •  Insulin Pen Needle (BD PEN NEEDLE SHORT U/F) 31G X 8 MM Does no Transmit (DEXCOM G6 TRANSMITTER) Does not apply Misc, 1 Device by Does not apply route continuous.  (Patient not taking: Reported on 9/2/2021 ), Disp: 1 each, Rfl: 3     Allergies:     Bees                    RASH    Social History:   Social History    Soci the insulin dose with meals   -Discussed at length the importance of regular follow up and importance of decreasing BG levels  -Discussed importance of glycemic control to prevent complications of diabetes  -Discussed complications of diabetes include reti

## 2021-09-02 NOTE — PATIENT INSTRUCTIONS
Lantus 56 units subcutaneous daily     Humalog   INSULIN SLIDING SCALE  Base Values  Breakfast: 16  Lunch: 18  Dinner: 18  Ranges:  80-99: -2  100-119: 0  120-139: 0  140-159: 1  160-179: 2  180-199: 3  200-219: 4  220-239: 5  240-259: 6  260-279: 7  280-2

## 2021-10-04 RX ORDER — ATORVASTATIN CALCIUM 40 MG/1
TABLET, FILM COATED ORAL
Qty: 90 TABLET | Refills: 1 | Status: SHIPPED | OUTPATIENT
Start: 2021-10-04 | End: 2022-01-07

## 2021-10-08 RX ORDER — INSULIN LISPRO 100 [IU]/ML
INJECTION, SOLUTION INTRAVENOUS; SUBCUTANEOUS
Qty: 75 ML | Refills: 0 | Status: SHIPPED | OUTPATIENT
Start: 2021-10-08

## 2021-10-08 NOTE — TELEPHONE ENCOUNTER
LOV: 9/2/21    Future Appointments   Date Time Provider Beau Jocelin   12/16/2021  1:30 PM Bradley Schaumann, MD [facility]     Refilled per protocol based on below instruction from Isidro Cole 11 Values  Breakfast: 16

## 2021-10-16 RX ORDER — INSULIN GLARGINE 100 [IU]/ML
INJECTION, SOLUTION SUBCUTANEOUS
Qty: 60 ML | Refills: 1 | Status: SHIPPED | OUTPATIENT
Start: 2021-10-16

## 2021-10-16 NOTE — TELEPHONE ENCOUNTER
LOV 09/02/21. F/u 12/16/21. Pended 6 month supply.  Per LOV \"-Increase Lantus to 56 units SQ daily\"

## 2021-10-22 ENCOUNTER — HOSPITAL ENCOUNTER (OUTPATIENT)
Age: 51
Discharge: HOME | End: 2021-10-22
Payer: COMMERCIAL

## 2021-10-22 VITALS
HEART RATE: 94 BPM | RESPIRATION RATE: 18 BRPM | OXYGEN SATURATION: 96 % | TEMPERATURE: 98 F | SYSTOLIC BLOOD PRESSURE: 164 MMHG | DIASTOLIC BLOOD PRESSURE: 88 MMHG

## 2021-10-22 DIAGNOSIS — J06.9 UPPER RESPIRATORY TRACT INFECTION, UNSPECIFIED TYPE: Primary | ICD-10-CM

## 2021-10-22 DIAGNOSIS — H66.92 LEFT OTITIS MEDIA, UNSPECIFIED OTITIS MEDIA TYPE: ICD-10-CM

## 2021-10-22 PROCEDURE — 99213 OFFICE O/P EST LOW 20 MIN: CPT

## 2021-10-22 RX ORDER — AMOXICILLIN 875 MG/1
875 TABLET, COATED ORAL 2 TIMES DAILY
Qty: 20 TABLET | Refills: 0 | Status: SHIPPED | OUTPATIENT
Start: 2021-10-22 | End: 2021-11-01

## 2021-10-22 NOTE — ED PROVIDER NOTES
Patient Seen in: [facility]      History   Patient presents with:  Cough/URI    Stated Complaint: cough     Subjective:   HPI  20-year-old male, with a history of hypertension, pneumonia and diabetes, presents to the immediate care with compl oropharyngeal exudate. Eyes:      Extraocular Movements: Extraocular movements intact. Pupils: Pupils are equal, round, and reactive to light. Cardiovascular:      Rate and Rhythm: Normal rate. Pulses: Normal pulses.    Pulmonary:      Effort:

## 2021-10-22 NOTE — ED INITIAL ASSESSMENT (HPI)
PATIENT ARRIVED AMBULATORY TO ROOM C/O SYMPTOMS THAT STARTED 4 DAYS AGO. +NASAL CONGESTION +CONGESTED COUGH. NO FEVERS. NO N/V/D. EASY NON LABORED RESPIRATIONS.

## 2021-11-10 ENCOUNTER — OFFICE VISIT (OUTPATIENT)
Dept: FAMILY MEDICINE CLINIC | Facility: CLINIC | Age: 51
End: 2021-11-10
Payer: COMMERCIAL

## 2021-11-10 VITALS
WEIGHT: 220 LBS | OXYGEN SATURATION: 97 % | HEIGHT: 72 IN | DIASTOLIC BLOOD PRESSURE: 87 MMHG | TEMPERATURE: 97 F | RESPIRATION RATE: 16 BRPM | BODY MASS INDEX: 29.8 KG/M2 | SYSTOLIC BLOOD PRESSURE: 143 MMHG | HEART RATE: 91 BPM

## 2021-11-10 DIAGNOSIS — H69.83 EUSTACHIAN TUBE DYSFUNCTION, BILATERAL: Primary | ICD-10-CM

## 2021-11-10 PROCEDURE — 99213 OFFICE O/P EST LOW 20 MIN: CPT | Performed by: NURSE PRACTITIONER

## 2021-11-10 PROCEDURE — 3077F SYST BP >= 140 MM HG: CPT | Performed by: NURSE PRACTITIONER

## 2021-11-10 PROCEDURE — 3079F DIAST BP 80-89 MM HG: CPT | Performed by: NURSE PRACTITIONER

## 2021-11-10 PROCEDURE — 3008F BODY MASS INDEX DOCD: CPT | Performed by: NURSE PRACTITIONER

## 2021-11-10 NOTE — PROGRESS NOTES
Bassam Yoo is a 49year old male who presents for  right ear fullness with occasional pain and vertigo sensation with nausea and vomiting x 24 hours. . Problem has been occurring for  2  days. Symptoms have been worsening since onset.   No sick conta Meloxicam 7.5 MG Oral Tab  (Patient not taking: Reported on 11/10/2021)     • Continuous Blood Gluc  (DEXCOM G6 ) Does not apply Device 1 Device by Does not apply route continuous.  (Patient not taking: No sig reported) 1 Device 0   • Contin are clear. No sinus tenderness. EARS:Bilateral hearing is intact. Bilat TMs:visible fluid bilaterally, no erythema, mild bulging, scant effusion, + air bubble behind right TM.   Bilat EACs: no tragal tenderness; EACs appear healthy  NECK: supple,no adenopa

## 2021-11-17 RX ORDER — AMLODIPINE BESYLATE 10 MG/1
TABLET ORAL
Qty: 90 TABLET | Refills: 1 | Status: SHIPPED | OUTPATIENT
Start: 2021-11-17

## 2021-12-08 ENCOUNTER — IMMUNIZATION (OUTPATIENT)
Dept: LAB | Facility: HOSPITAL | Age: 51
End: 2021-12-08
Attending: EMERGENCY MEDICINE
Payer: COMMERCIAL

## 2021-12-08 DIAGNOSIS — Z23 NEED FOR VACCINATION: Primary | ICD-10-CM

## 2021-12-08 PROCEDURE — 0064A SARSCOV2 VAC 50MCG/0.25ML IM: CPT

## 2022-01-07 RX ORDER — ATORVASTATIN CALCIUM 40 MG/1
TABLET, FILM COATED ORAL
Qty: 90 TABLET | Refills: 1 | Status: SHIPPED | OUTPATIENT
Start: 2022-01-07

## 2022-01-25 ENCOUNTER — TELEPHONE (OUTPATIENT)
Dept: PHYSICAL MEDICINE AND REHAB | Facility: CLINIC | Age: 52
End: 2022-01-25

## 2022-01-25 ENCOUNTER — OFFICE VISIT (OUTPATIENT)
Dept: NEUROLOGY | Facility: CLINIC | Age: 52
End: 2022-01-25
Payer: COMMERCIAL

## 2022-01-25 VITALS
BODY MASS INDEX: 30.48 KG/M2 | SYSTOLIC BLOOD PRESSURE: 166 MMHG | HEIGHT: 72 IN | DIASTOLIC BLOOD PRESSURE: 84 MMHG | WEIGHT: 225 LBS

## 2022-01-25 DIAGNOSIS — R20.0 RIGHT SIDED NUMBNESS: Primary | ICD-10-CM

## 2022-01-25 PROCEDURE — 99204 OFFICE O/P NEW MOD 45 MIN: CPT | Performed by: OTHER

## 2022-01-25 PROCEDURE — 3077F SYST BP >= 140 MM HG: CPT | Performed by: OTHER

## 2022-01-25 PROCEDURE — 3008F BODY MASS INDEX DOCD: CPT | Performed by: OTHER

## 2022-01-25 PROCEDURE — 3079F DIAST BP 80-89 MM HG: CPT | Performed by: OTHER

## 2022-01-25 RX ORDER — ASPIRIN 81 MG/1
81 TABLET ORAL DAILY
Qty: 90 TABLET | Refills: 3 | Status: SHIPPED | OUTPATIENT
Start: 2022-01-25

## 2022-01-25 NOTE — TELEPHONE ENCOUNTER
Initiated authorization for Carotid Doppler CPT 20050 with Barbra Holstein at [facility] of [address] is active  Case #[id number].   Status: Approved- Carotid Doppler-00329 Does not required authorization per health plan      Initiated authorization for 2D Echo

## 2022-01-26 ENCOUNTER — TELEPHONE (OUTPATIENT)
Dept: NEUROLOGY | Facility: CLINIC | Age: 52
End: 2022-01-26

## 2022-01-26 ENCOUNTER — HOSPITAL ENCOUNTER (OUTPATIENT)
Dept: ULTRASOUND IMAGING | Facility: HOSPITAL | Age: 52
Discharge: HOME | End: 2022-01-26
Attending: Other
Payer: COMMERCIAL

## 2022-01-26 DIAGNOSIS — R20.0 RIGHT SIDED NUMBNESS: ICD-10-CM

## 2022-01-26 PROCEDURE — 93880 EXTRACRANIAL BILAT STUDY: CPT | Performed by: OTHER

## 2022-01-26 NOTE — TELEPHONE ENCOUNTER
Spoke with pt about below notes. Pt as appreciative of the good news. Pt state Dr. Manuela Wen place a order for heart monitor to be worn and wanted to know for how long. Pt was instructed Per Dr. Manuela Wen note for 30 days.

## 2022-01-26 NOTE — TELEPHONE ENCOUNTER
----- Message from Clarita La MD sent at 1/26/2022  1:06 PM CST -----  Please let the patient know that doppler of carotids didn't show significant stenosis (narrowing).     Thank you

## 2022-01-27 ENCOUNTER — TELEPHONE (OUTPATIENT)
Dept: ENDOCRINOLOGY CLINIC | Facility: CLINIC | Age: 52
End: 2022-01-27

## 2022-01-27 RX ORDER — PEN NEEDLE, DIABETIC 31 GX5/16"
NEEDLE, DISPOSABLE MISCELLANEOUS
Qty: 400 EACH | Refills: 1 | Status: SHIPPED | OUTPATIENT
Start: 2022-01-27 | End: 2022-02-03

## 2022-01-27 NOTE — TELEPHONE ENCOUNTER
•  Insulin Pen Needle (BD PEN NEEDLE SHORT U/F) 31G X 8 MM Does not apply Misc, 1 each by Does not apply route 4 (four) times daily. , Disp: 400 each, Rfl: 0

## 2022-01-31 ENCOUNTER — HOSPITAL ENCOUNTER (OUTPATIENT)
Dept: CV DIAGNOSTICS | Facility: HOSPITAL | Age: 52
Discharge: HOME | End: 2022-01-31
Attending: Other
Payer: COMMERCIAL

## 2022-01-31 ENCOUNTER — TELEPHONE (OUTPATIENT)
Dept: NEUROLOGY | Facility: CLINIC | Age: 52
End: 2022-01-31

## 2022-01-31 DIAGNOSIS — R20.0 RIGHT SIDED NUMBNESS: ICD-10-CM

## 2022-01-31 PROCEDURE — 93306 TTE W/DOPPLER COMPLETE: CPT | Performed by: OTHER

## 2022-01-31 NOTE — TELEPHONE ENCOUNTER
----- Message from Cleavon Dubin, MD sent at 1/31/2022 10:34 AM CST -----  Please let the patient know that echocardiogram didn't show signs of the a. Fib or any holes that could have contributed to the TIA/stroke symptoms.

## 2022-02-02 ENCOUNTER — TELEPHONE (OUTPATIENT)
Dept: ENDOCRINOLOGY CLINIC | Facility: CLINIC | Age: 52
End: 2022-02-02

## 2022-02-02 ENCOUNTER — HOSPITAL ENCOUNTER (OUTPATIENT)
Dept: MRI IMAGING | Age: 52
Discharge: HOME | End: 2022-02-02
Attending: Other
Payer: COMMERCIAL

## 2022-02-02 ENCOUNTER — TELEPHONE (OUTPATIENT)
Dept: NEUROLOGY | Facility: CLINIC | Age: 52
End: 2022-02-02

## 2022-02-02 DIAGNOSIS — R20.0 RIGHT SIDED NUMBNESS: ICD-10-CM

## 2022-02-02 LAB — CREAT BLD-MCNC: 1.2 MG/DL

## 2022-02-02 PROCEDURE — A9575 INJ GADOTERATE MEGLUMI 0.1ML: HCPCS | Performed by: OTHER

## 2022-02-02 PROCEDURE — 82565 ASSAY OF CREATININE: CPT

## 2022-02-02 PROCEDURE — 70553 MRI BRAIN STEM W/O & W/DYE: CPT | Performed by: OTHER

## 2022-02-02 NOTE — TELEPHONE ENCOUNTER
----- Message from Clarita La MD sent at 2/2/2022  1:28 PM CST -----  Please let patient know that MRI brain showed a small subacute stroke as expected. Therefore he should continue with during the rest of the work-up. He should continue with aspirin as well.

## 2022-02-02 NOTE — TELEPHONE ENCOUNTER
Spoke to patient, relayed information from Dr Dee Malagon note 2/2/22. Patient still has to schedule 30 day monitor. Instructed to call to schedule. Ok to see cardiologist if office requires office visit first. Marija Peterson to call this office back if any further questions when calling to schedule 30 day monitor. Made NOV for 3/1/22.

## 2022-02-03 RX ORDER — PEN NEEDLE, DIABETIC 31 GX5/16"
NEEDLE, DISPOSABLE MISCELLANEOUS
Qty: 400 EACH | Refills: 1 | Status: SHIPPED | OUTPATIENT
Start: 2022-02-03

## 2022-02-23 RX ORDER — INSULIN LISPRO 100 [IU]/ML
INJECTION, SOLUTION INTRAVENOUS; SUBCUTANEOUS
Qty: 75 ML | Refills: 1 | Status: SHIPPED | OUTPATIENT
Start: 2022-02-23

## 2022-03-04 ENCOUNTER — TELEMEDICINE (OUTPATIENT)
Dept: NEUROLOGY | Facility: CLINIC | Age: 52
End: 2022-03-04
Payer: COMMERCIAL

## 2022-03-04 DIAGNOSIS — R20.0 RIGHT SIDED NUMBNESS: Primary | ICD-10-CM

## 2022-03-04 PROCEDURE — 99214 OFFICE O/P EST MOD 30 MIN: CPT | Performed by: OTHER

## 2022-03-14 ENCOUNTER — LAB ENCOUNTER (OUTPATIENT)
Dept: LAB | Facility: HOSPITAL | Age: 52
End: 2022-03-14
Attending: INTERNAL MEDICINE
Payer: COMMERCIAL

## 2022-03-14 ENCOUNTER — OFFICE VISIT (OUTPATIENT)
Dept: ENDOCRINOLOGY CLINIC | Facility: CLINIC | Age: 52
End: 2022-03-14
Payer: COMMERCIAL

## 2022-03-14 VITALS
HEART RATE: 95 BPM | WEIGHT: 230 LBS | BODY MASS INDEX: 31 KG/M2 | DIASTOLIC BLOOD PRESSURE: 72 MMHG | SYSTOLIC BLOOD PRESSURE: 108 MMHG

## 2022-03-14 DIAGNOSIS — IMO0002 UNCONTROLLED TYPE 1 DIABETES MELLITUS WITH COMPLICATION: ICD-10-CM

## 2022-03-14 DIAGNOSIS — IMO0002 UNCONTROLLED TYPE 1 DIABETES MELLITUS WITH COMPLICATION: Primary | ICD-10-CM

## 2022-03-14 LAB
ALBUMIN SERPL-MCNC: 4 G/DL (ref 3.4–5)
ALBUMIN/GLOB SERPL: 1.1 {RATIO} (ref 1–2)
ALP LIVER SERPL-CCNC: 102 U/L
ALT SERPL-CCNC: 39 U/L
ANION GAP SERPL CALC-SCNC: 6 MMOL/L (ref 0–18)
AST SERPL-CCNC: 21 U/L (ref 15–37)
BILIRUB SERPL-MCNC: 0.8 MG/DL (ref 0.1–2)
BUN BLD-MCNC: 21 MG/DL (ref 7–18)
BUN/CREAT SERPL: 15.1 (ref 10–20)
CALCIUM BLD-MCNC: 9.6 MG/DL (ref 8.5–10.1)
CARTRIDGE LOT#: ABNORMAL NUMERIC
CHLORIDE SERPL-SCNC: 102 MMOL/L (ref 98–112)
CHOLEST SERPL-MCNC: 154 MG/DL (ref ?–200)
CO2 SERPL-SCNC: 30 MMOL/L (ref 21–32)
CREAT BLD-MCNC: 1.39 MG/DL
CREAT UR-SCNC: 90.5 MG/DL
FASTING PATIENT LIPID ANSWER: YES
GLOBULIN PLAS-MCNC: 3.6 G/DL (ref 2.8–4.4)
GLUCOSE BLD-MCNC: 93 MG/DL (ref 70–99)
GLUCOSE BLOOD: 181
HDLC SERPL-MCNC: 42 MG/DL (ref 40–59)
HEMOGLOBIN A1C: 10.3 % (ref 4.3–5.6)
LDLC SERPL CALC-MCNC: 88 MG/DL (ref ?–100)
MICROALBUMIN UR-MCNC: 9.5 MG/DL
MICROALBUMIN/CREAT 24H UR-RTO: 105 UG/MG (ref ?–30)
NONHDLC SERPL-MCNC: 112 MG/DL (ref ?–130)
OSMOLALITY SERPL CALC.SUM OF ELEC: 289 MOSM/KG (ref 275–295)
POTASSIUM SERPL-SCNC: 4.5 MMOL/L (ref 3.5–5.1)
PROT SERPL-MCNC: 7.6 G/DL (ref 6.4–8.2)
SODIUM SERPL-SCNC: 138 MMOL/L (ref 136–145)
TEST STRIP LOT #: NORMAL NUMERIC
TRIGL SERPL-MCNC: 136 MG/DL (ref 30–149)
TSI SER-ACNC: 4.45 MIU/ML (ref 0.36–3.74)
VLDLC SERPL CALC-MCNC: 22 MG/DL (ref 0–30)

## 2022-03-14 PROCEDURE — 84443 ASSAY THYROID STIM HORMONE: CPT

## 2022-03-14 PROCEDURE — 82947 ASSAY GLUCOSE BLOOD QUANT: CPT | Performed by: INTERNAL MEDICINE

## 2022-03-14 PROCEDURE — 3074F SYST BP LT 130 MM HG: CPT | Performed by: INTERNAL MEDICINE

## 2022-03-14 PROCEDURE — 36415 COLL VENOUS BLD VENIPUNCTURE: CPT

## 2022-03-14 PROCEDURE — 3060F POS MICROALBUMINURIA REV: CPT | Performed by: INTERNAL MEDICINE

## 2022-03-14 PROCEDURE — 82043 UR ALBUMIN QUANTITATIVE: CPT

## 2022-03-14 PROCEDURE — 80053 COMPREHEN METABOLIC PANEL: CPT

## 2022-03-14 PROCEDURE — 36416 COLLJ CAPILLARY BLOOD SPEC: CPT | Performed by: INTERNAL MEDICINE

## 2022-03-14 PROCEDURE — 3046F HEMOGLOBIN A1C LEVEL >9.0%: CPT | Performed by: INTERNAL MEDICINE

## 2022-03-14 PROCEDURE — 3078F DIAST BP <80 MM HG: CPT | Performed by: INTERNAL MEDICINE

## 2022-03-14 PROCEDURE — 80061 LIPID PANEL: CPT

## 2022-03-14 PROCEDURE — 99214 OFFICE O/P EST MOD 30 MIN: CPT | Performed by: INTERNAL MEDICINE

## 2022-03-14 PROCEDURE — 3061F NEG MICROALBUMINURIA REV: CPT | Performed by: INTERNAL MEDICINE

## 2022-03-14 PROCEDURE — 82570 ASSAY OF URINE CREATININE: CPT

## 2022-03-14 PROCEDURE — 83036 HEMOGLOBIN GLYCOSYLATED A1C: CPT | Performed by: INTERNAL MEDICINE

## 2022-03-14 RX ORDER — BLOOD-GLUCOSE SENSOR
1 EACH MISCELLANEOUS
Qty: 9 EACH | Refills: 3 | Status: SHIPPED | OUTPATIENT
Start: 2022-03-14

## 2022-03-14 RX ORDER — BLOOD-GLUCOSE TRANSMITTER
1 EACH MISCELLANEOUS
Qty: 1 EACH | Refills: 3 | Status: SHIPPED | OUTPATIENT
Start: 2022-03-14

## 2022-03-14 NOTE — PATIENT INSTRUCTIONS
Lantus 50 unit subcutaneous daily    2 weeks with CDE     Humalog   INSULIN SLIDING SCALE  Base Values  Breakfast: 12  Lunch: 16  Dinner: 16  Ranges:  80-99: -2  100-119: 0  120-139: 0  140-159: 1  160-179: 1  180-199: 2  200-219: 2  220-239: 3  240-259: 3  260-279: 4  280-299: 4  300-319: 5  320-339: 5  340-359: 6  360-379: 6

## 2022-03-17 RX ORDER — IRBESARTAN AND HYDROCHLOROTHIAZIDE 300; 12.5 MG/1; MG/1
TABLET, FILM COATED ORAL
Qty: 90 TABLET | Refills: 3 | Status: SHIPPED | OUTPATIENT
Start: 2022-03-17

## 2022-03-17 NOTE — TELEPHONE ENCOUNTER
Patient returned call, please call at [phone],NBYWNY.   *patient request a direct number, ok to leave message

## 2022-03-17 NOTE — TELEPHONE ENCOUNTER
Returned pt's call and helped schedule an appt for 3/28/22 with CDE for Dexcom Download    And 6/27/22 with MD for 3 Month FU

## 2022-03-17 NOTE — TELEPHONE ENCOUNTER
LOV: 3/14/22    RTC: 3 Months with MD, 2 weeks with CDE    FU: No FU Appt Scheduled    1 Year Supply Pending     Called to help schedule an appt with CDE, no answer, LMTCB

## 2022-03-24 ENCOUNTER — TELEPHONE (OUTPATIENT)
Dept: ENDOCRINOLOGY CLINIC | Facility: CLINIC | Age: 52
End: 2022-03-24

## 2022-03-24 NOTE — TELEPHONE ENCOUNTER
Medication PA Requested: Continuous Blood Gluc Sensor (DEXCOM G6 SENSOR                                                         CoverMyMeds Used:  Key:  Quantity:9    Day Supply: 90 days  Sig: use as directed to check blood sugars -change every 10 days  DX Code:E10.64, E10.8                                 CPT code (if applicable):   Case Number/Pending Ref#:  Patient indicates prior authorization is needed for continuous glucose monitor sensors, please call [facility], phone [phone]  Thank you

## 2022-03-24 NOTE — TELEPHONE ENCOUNTER
Patient indicates prior authorization is needed for continuous glucose monitor sensors, please call [facility], phone [phone],NBBUCP.

## 2022-03-24 NOTE — TELEPHONE ENCOUNTER
Medication PA Requested: Continuous Blood Gluc Transmit (DEXCOM G6 TRANSMITTER                                                          CoverMyMeds Used:  Key:  Quantity:1  Day Supply: 90 day  Sig: use as directed to check blood glucose - change every 90 days  DX Code:  E10.64, E10.8                                   CPT code (if applicable):   Case Number/Pending Ref#:  Patient indicates prior authorization is needed for continuous glucose monitor sensors, please call [facility] [place], phone [phone]  Thank you

## 2022-03-25 NOTE — PROGRESS NOTES
Continuous Glucose Monitor Download    Start Time: 8:40am  End Time: 9:05am    Indication: Pt was last seen by Dr Virgene Ganser  3/14 where he was restarted on dexcom during OV. Concerns on insulin timing dose and importance of BG management due to developing complications such at CKD and CVA were discussed. Other: Pt states he lost his transmitter but a new one should be coming in. Since his appt with Dr Virgene Ganser, he has been working on his nutrition. He is eating minimal red meat, fruits, vegetables, cut out cheese, and minimal oils/fats. He has noticed a difference in dexcom reading and his meter reading which has caused him to calibrate. He takes his Lantus in the evening but has noticed a natural rise occurring around 4pm (hours after eating lunch and before eating dinner). A1c: 10.3% (3/14/2022)    Current Diabetes Medication:  - Lantus 52 units daily  - Humalog 12-16-16 (CF 1:10 > 180 mg/dl)    Sensor Type: Dexcom    Reviewed CGMS download and patient logs:  Days of sensor use: 10 days  Average glucose (mg/dL): 175 mg/dl  Standard deviation: 58  Target Ranges:  mg/dL          55% of time in range  44% of time above range  <1% of time below range  Pattern of hypoglycemia: NA  Pattern of hyperglycemia: Meal time  Interpretation/Assessment: Reviewed [facility] Mikayla download which reflects much better control than prior to his appointment with Dr Virgene Ganser. No patterns of hypoglycemia noted. Hyperglycemia noted around meal time especially dinner. A lot of times, this will carry throughout the night. Denies eating meals high in fat which could alter delayed hyperglycemia. Natural rise occurring in between 3-4pm which is most likely related to Lantus wearing off. Has always followed a 1:10 > 180 mg/dl. Discussed aggressive CF but can always consider lowering starting number at 150 mg/dl for tighter control. Recommended medication changes/Plan:  - Increase dinner to 18 units.  If having a large lunch, can administer 18 units  - Pre-bolus 20 minutes (can do 25-30 for large carb meals or breakfast)  - Mychart in 1-2 weeks with update  - If issues with receiving Dexcom, call office  - Message sent to Dr Abimbola Boyle in regards to switching to [place] (looks like PA is needed)    Updated Diabetes Medication:  - Lantus 52 units daily (or Tresiba if approved)  - Humalog 12-16-18 (CF 1:10 > 180 mg/dl)    Follow up:  Dr Abimbola Boyle 6/27/2022    [address] [facility]

## 2022-03-28 ENCOUNTER — NURSE ONLY (OUTPATIENT)
Dept: ENDOCRINOLOGY CLINIC | Facility: CLINIC | Age: 52
End: 2022-03-28
Payer: COMMERCIAL

## 2022-03-28 ENCOUNTER — TELEPHONE (OUTPATIENT)
Dept: ENDOCRINOLOGY CLINIC | Facility: CLINIC | Age: 52
End: 2022-03-28

## 2022-03-28 DIAGNOSIS — IMO0002 UNCONTROLLED TYPE 1 DIABETES MELLITUS WITH COMPLICATION: Primary | ICD-10-CM

## 2022-03-28 PROCEDURE — 99211 OFF/OP EST MAY X REQ PHY/QHP: CPT | Performed by: INTERNAL MEDICINE

## 2022-03-28 RX ORDER — INSULIN DEGLUDEC INJECTION 100 U/ML
52 INJECTION, SOLUTION SUBCUTANEOUS DAILY
Qty: 52 ML | Refills: 1 | OUTPATIENT
Start: 2022-03-28 | End: 2022-06-26

## 2022-03-28 NOTE — TELEPHONE ENCOUNTER
[facility] Dexcom and pt reflect a natural rise occurring between 3-4pm hours after lunch has been consumed. Pt takes Lantus in the evening/night time. Natural rise most likely occurring from Lantus wearing off. Dr Sheridan Cobos    [facility] to send in Rx for Tresiba?  Will need PA  Kristina Lipa Flextouch 100u/1ml: Inject 52 units daily as directed, 60ml with 1 refill)    Rationale:  - Pt has type 1 diabetes  - Uncontrolled management of diabetes as evident by hemoglobin a1c of 10.3% on 3/14/2022  - Pt is currently taking multiple insulin injections per day (>4 administrations)  - Has tried Lantus but blood sugar trends demonstrate that Lantus wears off by 18 hours  - Pt would benefit from an extra long insulin with a longer half life than Lantus or Levemir which would provide coverage up to 42 hours  - Pt has demonstrated knowledge and motivation of diabetes care and [facility]

## 2022-03-29 NOTE — TELEPHONE ENCOUNTER
Called [facility] [phone], spoke with Renzo. She states PA for [facility] transmitter and sensors were approved 3/24. Auth # sensors [id number], transmitters [id number] effective 3/24/2022-3/24/2023. Call ref [phone] home delivery pharmacy, [phone]. Spoke with Alan Porter, informed of approval. She states both went through for 90 day supply with copay of $135.00 and they will notify the patient before they ship it.      My chart message sent to patient of approval.

## 2022-04-03 ENCOUNTER — HOSPITAL ENCOUNTER (OUTPATIENT)
Age: 52
Discharge: HOME | End: 2022-04-03
Payer: COMMERCIAL

## 2022-04-03 VITALS
OXYGEN SATURATION: 99 % | SYSTOLIC BLOOD PRESSURE: 139 MMHG | HEART RATE: 86 BPM | RESPIRATION RATE: 18 BRPM | DIASTOLIC BLOOD PRESSURE: 78 MMHG | TEMPERATURE: 97 F

## 2022-04-03 DIAGNOSIS — R09.82 POST-NASAL DRIP: ICD-10-CM

## 2022-04-03 DIAGNOSIS — J06.9 UPPER RESPIRATORY TRACT INFECTION, UNSPECIFIED TYPE: Primary | ICD-10-CM

## 2022-04-03 DIAGNOSIS — Z20.822 LAB TEST NEGATIVE FOR COVID-19 VIRUS: ICD-10-CM

## 2022-04-03 DIAGNOSIS — Z20.822 ENCOUNTER FOR SCREENING LABORATORY TESTING FOR COVID-19 VIRUS: ICD-10-CM

## 2022-04-03 LAB
S PYO AG THROAT QL: NEGATIVE
SARS-COV-2 RNA RESP QL NAA+PROBE: NOT DETECTED

## 2022-04-03 PROCEDURE — 87880 STREP A ASSAY W/OPTIC: CPT | Performed by: NURSE PRACTITIONER

## 2022-04-03 PROCEDURE — 99213 OFFICE O/P EST LOW 20 MIN: CPT | Performed by: NURSE PRACTITIONER

## 2022-04-03 PROCEDURE — U0002 COVID-19 LAB TEST NON-CDC: HCPCS | Performed by: NURSE PRACTITIONER

## 2022-04-03 RX ORDER — BENZONATATE 100 MG/1
100 CAPSULE ORAL 3 TIMES DAILY PRN
Qty: 12 CAPSULE | Refills: 0 | Status: SHIPPED | OUTPATIENT
Start: 2022-04-03

## 2022-04-03 RX ORDER — FLUTICASONE PROPIONATE 50 MCG
2 SPRAY, SUSPENSION (ML) NASAL DAILY
Qty: 16 G | Refills: 0 | Status: SHIPPED | OUTPATIENT
Start: 2022-04-03

## 2022-04-03 RX ORDER — LORATADINE 10 MG/1
10 TABLET ORAL DAILY
Qty: 30 TABLET | Refills: 0 | Status: SHIPPED | OUTPATIENT
Start: 2022-04-03

## 2022-04-13 RX ORDER — ATORVASTATIN CALCIUM 40 MG/1
TABLET, FILM COATED ORAL
Qty: 90 TABLET | Refills: 1 | Status: SHIPPED | OUTPATIENT
Start: 2022-04-13

## 2022-04-19 ENCOUNTER — HOSPITAL ENCOUNTER (OUTPATIENT)
Age: 52
Discharge: HOME | End: 2022-04-19
Payer: COMMERCIAL

## 2022-04-19 ENCOUNTER — APPOINTMENT (OUTPATIENT)
Dept: GENERAL RADIOLOGY | Age: 52
End: 2022-04-19
Attending: NURSE PRACTITIONER
Payer: COMMERCIAL

## 2022-04-19 VITALS
WEIGHT: 220 LBS | SYSTOLIC BLOOD PRESSURE: 146 MMHG | TEMPERATURE: 97 F | BODY MASS INDEX: 29.8 KG/M2 | HEART RATE: 106 BPM | OXYGEN SATURATION: 99 % | DIASTOLIC BLOOD PRESSURE: 81 MMHG | RESPIRATION RATE: 18 BRPM | HEIGHT: 72 IN

## 2022-04-19 DIAGNOSIS — J40 BRONCHITIS: Primary | ICD-10-CM

## 2022-04-19 LAB — GLUCOSE BLDC GLUCOMTR-MCNC: 259 MG/DL (ref 70–99)

## 2022-04-19 PROCEDURE — 71046 X-RAY EXAM CHEST 2 VIEWS: CPT | Performed by: NURSE PRACTITIONER

## 2022-04-19 RX ORDER — BENZONATATE 100 MG/1
100 CAPSULE ORAL 3 TIMES DAILY PRN
Qty: 15 CAPSULE | Refills: 0 | Status: SHIPPED | OUTPATIENT
Start: 2022-04-19

## 2022-05-20 RX ORDER — INSULIN LISPRO 100 [IU]/ML
INJECTION, SOLUTION INTRAVENOUS; SUBCUTANEOUS
Qty: 18 ML | Refills: 0 | Status: SHIPPED | OUTPATIENT
Start: 2022-05-20

## 2022-07-05 RX ORDER — INSULIN GLARGINE 100 [IU]/ML
INJECTION, SOLUTION SUBCUTANEOUS
Qty: 60 ML | Refills: 2 | Status: SHIPPED | OUTPATIENT
Start: 2022-07-05

## 2022-07-18 RX ORDER — ATORVASTATIN CALCIUM 40 MG/1
40 TABLET, FILM COATED ORAL NIGHTLY
Qty: 90 TABLET | Refills: 0 | Status: SHIPPED | OUTPATIENT
Start: 2022-07-18 | End: 2022-10-16

## 2022-07-21 ENCOUNTER — OFFICE VISIT (OUTPATIENT)
Dept: ENDOCRINOLOGY CLINIC | Facility: CLINIC | Age: 52
End: 2022-07-21
Payer: COMMERCIAL

## 2022-07-21 VITALS
HEART RATE: 101 BPM | DIASTOLIC BLOOD PRESSURE: 99 MMHG | SYSTOLIC BLOOD PRESSURE: 166 MMHG | BODY MASS INDEX: 32 KG/M2 | WEIGHT: 234.63 LBS

## 2022-07-21 DIAGNOSIS — E87.0 DM (DIABETES MELLITUS), TYPE 1, UNCONTROLLED, WITH HYPEROSMOLARITY (HCC): Primary | ICD-10-CM

## 2022-07-21 DIAGNOSIS — E10.69 DM (DIABETES MELLITUS), TYPE 1, UNCONTROLLED, WITH HYPEROSMOLARITY (HCC): Primary | ICD-10-CM

## 2022-07-21 DIAGNOSIS — E10.65 DM (DIABETES MELLITUS), TYPE 1, UNCONTROLLED, WITH HYPEROSMOLARITY (HCC): Primary | ICD-10-CM

## 2022-07-21 LAB
CARTRIDGE LOT#: ABNORMAL NUMERIC
GLUCOSE BLOOD: 72
HEMOGLOBIN A1C: 9 % (ref 4.3–5.6)
TEST STRIP LOT #: NORMAL NUMERIC

## 2022-07-21 PROCEDURE — 83036 HEMOGLOBIN GLYCOSYLATED A1C: CPT | Performed by: INTERNAL MEDICINE

## 2022-07-21 PROCEDURE — 3052F HG A1C>EQUAL 8.0%<EQUAL 9.0%: CPT | Performed by: INTERNAL MEDICINE

## 2022-07-21 PROCEDURE — 82947 ASSAY GLUCOSE BLOOD QUANT: CPT | Performed by: INTERNAL MEDICINE

## 2022-07-21 PROCEDURE — 3080F DIAST BP >= 90 MM HG: CPT | Performed by: INTERNAL MEDICINE

## 2022-07-21 PROCEDURE — 99214 OFFICE O/P EST MOD 30 MIN: CPT | Performed by: INTERNAL MEDICINE

## 2022-07-21 PROCEDURE — 3077F SYST BP >= 140 MM HG: CPT | Performed by: INTERNAL MEDICINE

## 2022-07-21 RX ORDER — METOPROLOL SUCCINATE 100 MG/1
100 TABLET, EXTENDED RELEASE ORAL DAILY
Qty: 90 TABLET | Refills: 1 | Status: SHIPPED | OUTPATIENT
Start: 2022-07-21

## 2022-07-21 RX ORDER — SEMAGLUTIDE 1.34 MG/ML
0.5 INJECTION, SOLUTION SUBCUTANEOUS WEEKLY
Qty: 4.5 ML | Refills: 1 | Status: SHIPPED | OUTPATIENT
Start: 2022-07-21

## 2022-07-21 NOTE — PATIENT INSTRUCTIONS
CONTINUE Lantus 56 units subcutaneous daily  -->decrease Lantus to 50 units when active     CONTINUE current dose of Humalog    START Ozempic 0.25mg subcutaneous weekly for one month then increase to 0.5mg subcutaneous weekly

## 2022-08-15 NOTE — TELEPHONE ENCOUNTER
LOV: 7/21/22    RTC: 3 Months    FU: 11/3/22    Last Refill: 5/20/22    3 Month Supply Pending    Please make any changes if needed

## 2022-08-16 ENCOUNTER — HOSPITAL ENCOUNTER (OUTPATIENT)
Age: 52
Discharge: HOME | End: 2022-08-16
Payer: COMMERCIAL

## 2022-08-16 VITALS
RESPIRATION RATE: 20 BRPM | OXYGEN SATURATION: 100 % | HEIGHT: 72 IN | WEIGHT: 225 LBS | BODY MASS INDEX: 30.48 KG/M2 | TEMPERATURE: 97 F | DIASTOLIC BLOOD PRESSURE: 86 MMHG | SYSTOLIC BLOOD PRESSURE: 165 MMHG | HEART RATE: 102 BPM

## 2022-08-16 DIAGNOSIS — R03.0 ELEVATED BLOOD PRESSURE READING: ICD-10-CM

## 2022-08-16 DIAGNOSIS — L03.115 CELLULITIS OF RIGHT LEG: Primary | ICD-10-CM

## 2022-08-16 PROCEDURE — 90715 TDAP VACCINE 7 YRS/> IM: CPT | Performed by: NURSE PRACTITIONER

## 2022-08-16 PROCEDURE — 90471 IMMUNIZATION ADMIN: CPT | Performed by: NURSE PRACTITIONER

## 2022-08-16 PROCEDURE — 99213 OFFICE O/P EST LOW 20 MIN: CPT | Performed by: NURSE PRACTITIONER

## 2022-08-16 RX ORDER — INSULIN LISPRO 100 [IU]/ML
INJECTION, SOLUTION INTRAVENOUS; SUBCUTANEOUS
Qty: 75 ML | Refills: 0 | Status: SHIPPED | OUTPATIENT
Start: 2022-08-16

## 2022-08-16 RX ORDER — CLINDAMYCIN HYDROCHLORIDE 300 MG/1
300 CAPSULE ORAL 3 TIMES DAILY
Qty: 30 CAPSULE | Refills: 0 | Status: SHIPPED | OUTPATIENT
Start: 2022-08-16 | End: 2022-08-26
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
## 2022-09-01 ENCOUNTER — OFFICE VISIT (OUTPATIENT)
Dept: WOUND CARE | Facility: HOSPITAL | Age: 52
End: 2022-09-01
Attending: NURSE PRACTITIONER
Payer: COMMERCIAL

## 2022-09-01 VITALS
BODY MASS INDEX: 30.48 KG/M2 | TEMPERATURE: 98 F | HEIGHT: 72 IN | DIASTOLIC BLOOD PRESSURE: 91 MMHG | SYSTOLIC BLOOD PRESSURE: 143 MMHG | RESPIRATION RATE: 18 BRPM | OXYGEN SATURATION: 96 % | WEIGHT: 225 LBS | HEART RATE: 92 BPM

## 2022-09-01 DIAGNOSIS — L98.499 DIABETES MELLITUS WITH SKIN ULCER (HCC): ICD-10-CM

## 2022-09-01 DIAGNOSIS — L97.922 NON-PRESSURE CHRONIC ULCER OF LEFT LOWER LEG WITH FAT LAYER EXPOSED (HCC): ICD-10-CM

## 2022-09-01 DIAGNOSIS — E11.622 DIABETES MELLITUS WITH SKIN ULCER (HCC): ICD-10-CM

## 2022-09-01 DIAGNOSIS — L97.912 NON-PRESSURE CHRONIC ULCER OF RIGHT LOWER LEG WITH FAT LAYER EXPOSED (HCC): Primary | ICD-10-CM

## 2022-09-01 LAB — AMB EXT GLUCOSE: 146 MG/DL

## 2022-09-01 PROCEDURE — 99214 OFFICE O/P EST MOD 30 MIN: CPT

## 2022-09-01 PROCEDURE — 97597 DBRDMT OPN WND 1ST 20 CM/<: CPT | Performed by: NURSE PRACTITIONER

## 2022-09-08 ENCOUNTER — NURSE ONLY (OUTPATIENT)
Dept: WOUND CARE | Facility: HOSPITAL | Age: 52
End: 2022-09-08
Attending: NURSE PRACTITIONER
Payer: COMMERCIAL

## 2022-09-08 VITALS
DIASTOLIC BLOOD PRESSURE: 86 MMHG | HEART RATE: 88 BPM | OXYGEN SATURATION: 97 % | RESPIRATION RATE: 19 BRPM | SYSTOLIC BLOOD PRESSURE: 141 MMHG | TEMPERATURE: 98 F

## 2022-09-08 DIAGNOSIS — L97.912 NON-PRESSURE CHRONIC ULCER OF RIGHT LOWER LEG WITH FAT LAYER EXPOSED (HCC): ICD-10-CM

## 2022-09-08 DIAGNOSIS — L98.499 DIABETES MELLITUS WITH SKIN ULCER (HCC): ICD-10-CM

## 2022-09-08 DIAGNOSIS — E11.622 DIABETES MELLITUS WITH SKIN ULCER (HCC): ICD-10-CM

## 2022-09-08 DIAGNOSIS — L97.922 NON-PRESSURE CHRONIC ULCER OF LEFT LOWER LEG WITH FAT LAYER EXPOSED (HCC): ICD-10-CM

## 2022-09-08 LAB — AMB EXT GLUCOSE: 136 MG/DL

## 2022-09-08 PROCEDURE — 29581 APPL MULTLAYER CMPRN SYS LEG: CPT

## 2022-09-12 ENCOUNTER — APPOINTMENT (OUTPATIENT)
Dept: WOUND CARE | Facility: HOSPITAL | Age: 52
End: 2022-09-12
Attending: NURSE PRACTITIONER
Payer: COMMERCIAL

## 2022-09-16 ENCOUNTER — NURSE ONLY (OUTPATIENT)
Dept: WOUND CARE | Facility: HOSPITAL | Age: 52
End: 2022-09-16
Attending: NURSE PRACTITIONER
Payer: COMMERCIAL

## 2022-09-16 VITALS
HEART RATE: 77 BPM | OXYGEN SATURATION: 96 % | RESPIRATION RATE: 20 BRPM | DIASTOLIC BLOOD PRESSURE: 95 MMHG | SYSTOLIC BLOOD PRESSURE: 153 MMHG | TEMPERATURE: 98 F

## 2022-09-16 DIAGNOSIS — L97.922 NON-PRESSURE CHRONIC ULCER OF LEFT LOWER LEG WITH FAT LAYER EXPOSED (HCC): ICD-10-CM

## 2022-09-16 DIAGNOSIS — L97.912 NON-PRESSURE CHRONIC ULCER OF RIGHT LOWER LEG WITH FAT LAYER EXPOSED (HCC): ICD-10-CM

## 2022-09-16 DIAGNOSIS — L98.499 DIABETES MELLITUS WITH SKIN ULCER (HCC): ICD-10-CM

## 2022-09-16 DIAGNOSIS — E11.622 DIABETES MELLITUS WITH SKIN ULCER (HCC): ICD-10-CM

## 2022-09-16 PROCEDURE — 29581 APPL MULTLAYER CMPRN SYS LEG: CPT

## 2022-09-22 ENCOUNTER — APPOINTMENT (OUTPATIENT)
Dept: WOUND CARE | Facility: HOSPITAL | Age: 52
End: 2022-09-22
Attending: NURSE PRACTITIONER
Payer: COMMERCIAL

## 2022-09-23 ENCOUNTER — OFFICE VISIT (OUTPATIENT)
Dept: WOUND CARE | Facility: HOSPITAL | Age: 52
End: 2022-09-23
Attending: NURSE PRACTITIONER
Payer: COMMERCIAL

## 2022-09-23 VITALS
RESPIRATION RATE: 19 BRPM | DIASTOLIC BLOOD PRESSURE: 85 MMHG | HEART RATE: 95 BPM | TEMPERATURE: 97 F | SYSTOLIC BLOOD PRESSURE: 160 MMHG | OXYGEN SATURATION: 98 %

## 2022-09-23 DIAGNOSIS — L98.499 DIABETES MELLITUS WITH SKIN ULCER (HCC): ICD-10-CM

## 2022-09-23 DIAGNOSIS — E11.622 DIABETES MELLITUS WITH SKIN ULCER (HCC): ICD-10-CM

## 2022-09-23 DIAGNOSIS — L97.922 NON-PRESSURE CHRONIC ULCER OF LEFT LOWER LEG WITH FAT LAYER EXPOSED (HCC): ICD-10-CM

## 2022-09-23 LAB — AMB EXT GLUCOSE: 180 MG/DL

## 2022-09-23 PROCEDURE — 99213 OFFICE O/P EST LOW 20 MIN: CPT | Performed by: NURSE PRACTITIONER

## 2022-09-30 ENCOUNTER — APPOINTMENT (OUTPATIENT)
Dept: WOUND CARE | Facility: HOSPITAL | Age: 52
End: 2022-09-30
Attending: NURSE PRACTITIONER
Payer: COMMERCIAL

## 2022-10-04 NOTE — PROGRESS NOTES
Neurology Initial Visit     Referred By: Dr. Damon ref. provider found    Chief Complaint: Patient presents with:  Numbness: Patient presents today c/o numbness, tingling, burning sensation on right side of body.   He states that it started the begining of t (LANTUS SOLOSTAR) 100 UNIT/ML Subcutaneous Solution Pen-injector, INJECT SUBCUTANEOUSLY 56  UNITS DAILY AS DIRECTED, Disp: 60 mL, Rfl: 1  •  Insulin Lispro, 1 Unit Dial, (HUMALOG KWIKPEN) 100 UNIT/ML Subcutaneous Solution Pen-injector, INJECT 16 UNITS WITH Mental Status- Alert and oriented x3.   Normal attention span and concentration  Thought process intact  Memory intact- recent and remote  Mood intact  Fund of knowledge appropriate for education and age    Language intact including: comprehension, selene age, with his history of risk factors of diabetes, hypertension hyperlipidemia likely very strong suspicion for possible ischemic event.   Therefore MRI of the brain will be done but will also do it with and without contrast to rule out any neoplasm or demy Patient

## 2022-10-05 RX ORDER — METOPROLOL SUCCINATE 100 MG/1
TABLET, EXTENDED RELEASE ORAL
Qty: 102 TABLET | Refills: 2 | Status: SHIPPED | OUTPATIENT
Start: 2022-10-05

## 2022-10-07 ENCOUNTER — APPOINTMENT (OUTPATIENT)
Dept: WOUND CARE | Facility: HOSPITAL | Age: 52
End: 2022-10-07
Attending: NURSE PRACTITIONER
Payer: COMMERCIAL

## 2022-10-12 RX ORDER — ATORVASTATIN CALCIUM 40 MG/1
TABLET, FILM COATED ORAL
Qty: 90 TABLET | Refills: 1 | Status: SHIPPED | OUTPATIENT
Start: 2022-10-12

## 2022-12-07 RX ORDER — INSULIN GLARGINE 100 [IU]/ML
INJECTION, SOLUTION SUBCUTANEOUS
Qty: 60 ML | Refills: 0 | Status: SHIPPED | OUTPATIENT
Start: 2022-12-07

## 2022-12-07 NOTE — TELEPHONE ENCOUNTER
LOV: 7/21/22    Future Appointments   Date Time Provider Beau Monreal   3/20/2023 11:15 AM Arie Bullard MD [facility]

## 2022-12-12 RX ORDER — INSULIN LISPRO 100 [IU]/ML
INJECTION, SOLUTION INTRAVENOUS; SUBCUTANEOUS
Qty: 75 ML | Refills: 0 | Status: SHIPPED | OUTPATIENT
Start: 2022-12-12

## 2023-01-03 RX ORDER — IRBESARTAN AND HYDROCHLOROTHIAZIDE 300; 12.5 MG/1; MG/1
TABLET, FILM COATED ORAL
Qty: 90 TABLET | Refills: 1 | Status: SHIPPED | OUTPATIENT
Start: 2023-01-03

## 2023-01-03 NOTE — TELEPHONE ENCOUNTER
LOV: 7/21/22    RTC: 3 Months with MD    FU: 3/20/23    Last Refill: 3/17/22      Per below they are requesting a 1 Year Supply    1 Year Supply Pending Below

## 2023-01-16 RX ORDER — ATORVASTATIN CALCIUM 40 MG/1
40 TABLET, FILM COATED ORAL NIGHTLY
Qty: 90 TABLET | Refills: 0 | Status: SHIPPED | OUTPATIENT
Start: 2023-01-16 | End: 2023-04-16

## 2023-02-01 RX ORDER — INSULIN LISPRO 100 [IU]/ML
INJECTION, SOLUTION INTRAVENOUS; SUBCUTANEOUS
Qty: 75 ML | Refills: 0 | Status: SHIPPED | OUTPATIENT
Start: 2023-02-01

## 2023-02-01 NOTE — TELEPHONE ENCOUNTER
LOV: 7/21/2022    RTC: 3 months     F/U: 3/20/2023    Dr Viri Aldrich-- orders pending, approve if appropriate

## 2023-03-04 ENCOUNTER — HOSPITAL ENCOUNTER (OUTPATIENT)
Age: 53
Discharge: HOME | End: 2023-03-04
Payer: COMMERCIAL

## 2023-03-04 VITALS
SYSTOLIC BLOOD PRESSURE: 161 MMHG | HEART RATE: 92 BPM | OXYGEN SATURATION: 96 % | RESPIRATION RATE: 16 BRPM | DIASTOLIC BLOOD PRESSURE: 92 MMHG | TEMPERATURE: 97 F

## 2023-03-04 DIAGNOSIS — M43.6 TORTICOLLIS: Primary | ICD-10-CM

## 2023-03-04 RX ORDER — IBUPROFEN 600 MG/1
600 TABLET ORAL EVERY 8 HOURS PRN
Qty: 30 TABLET | Refills: 0 | Status: SHIPPED | OUTPATIENT
Start: 2023-03-04 | End: 2023-03-11

## 2023-03-04 RX ORDER — CYCLOBENZAPRINE HCL 10 MG
10 TABLET ORAL 3 TIMES DAILY PRN
Qty: 20 TABLET | Refills: 0 | Status: SHIPPED | OUTPATIENT
Start: 2023-03-04 | End: 2023-03-11

## 2023-03-04 NOTE — ED INITIAL ASSESSMENT (HPI)
Pt with R side neck pain. States fell asleep in awkward position on Monday night and felt pain on R side of neck. States pain has moved from R side to L side and now has returned to R side.  Disruptive to sleep and functioning

## 2023-03-15 ENCOUNTER — TELEPHONE (OUTPATIENT)
Dept: ENDOCRINOLOGY CLINIC | Facility: CLINIC | Age: 53
End: 2023-03-15

## 2023-03-15 DIAGNOSIS — E10.65 DM (DIABETES MELLITUS), TYPE 1, UNCONTROLLED, WITH HYPEROSMOLARITY: Primary | ICD-10-CM

## 2023-03-15 DIAGNOSIS — E10.69 DM (DIABETES MELLITUS), TYPE 1, UNCONTROLLED, WITH HYPEROSMOLARITY: Primary | ICD-10-CM

## 2023-03-15 DIAGNOSIS — E87.0 DM (DIABETES MELLITUS), TYPE 1, UNCONTROLLED, WITH HYPEROSMOLARITY: Primary | ICD-10-CM

## 2023-03-20 ENCOUNTER — LAB ENCOUNTER (OUTPATIENT)
Dept: LAB | Facility: HOSPITAL | Age: 53
End: 2023-03-20
Attending: INTERNAL MEDICINE
Payer: COMMERCIAL

## 2023-03-20 ENCOUNTER — OFFICE VISIT (OUTPATIENT)
Dept: ENDOCRINOLOGY CLINIC | Facility: CLINIC | Age: 53
End: 2023-03-20

## 2023-03-20 VITALS
WEIGHT: 235 LBS | DIASTOLIC BLOOD PRESSURE: 78 MMHG | SYSTOLIC BLOOD PRESSURE: 144 MMHG | BODY MASS INDEX: 32 KG/M2 | HEART RATE: 105 BPM

## 2023-03-20 DIAGNOSIS — E10.65 DM (DIABETES MELLITUS), TYPE 1, UNCONTROLLED, WITH HYPEROSMOLARITY (HCC): ICD-10-CM

## 2023-03-20 DIAGNOSIS — E87.0 DM (DIABETES MELLITUS), TYPE 1, UNCONTROLLED, WITH HYPEROSMOLARITY (HCC): Primary | ICD-10-CM

## 2023-03-20 DIAGNOSIS — E87.0 DM (DIABETES MELLITUS), TYPE 1, UNCONTROLLED, WITH HYPEROSMOLARITY (HCC): ICD-10-CM

## 2023-03-20 DIAGNOSIS — E10.69 DM (DIABETES MELLITUS), TYPE 1, UNCONTROLLED, WITH HYPEROSMOLARITY (HCC): ICD-10-CM

## 2023-03-20 DIAGNOSIS — E10.69 DM (DIABETES MELLITUS), TYPE 1, UNCONTROLLED, WITH HYPEROSMOLARITY (HCC): Primary | ICD-10-CM

## 2023-03-20 DIAGNOSIS — I10 HYPERTENSION, UNSPECIFIED TYPE: ICD-10-CM

## 2023-03-20 DIAGNOSIS — E78.5 HYPERLIPIDEMIA, UNSPECIFIED HYPERLIPIDEMIA TYPE: ICD-10-CM

## 2023-03-20 DIAGNOSIS — E10.65 DM (DIABETES MELLITUS), TYPE 1, UNCONTROLLED, WITH HYPEROSMOLARITY (HCC): Primary | ICD-10-CM

## 2023-03-20 LAB
CARTRIDGE LOT#: ABNORMAL NUMERIC
EST. AVERAGE GLUCOSE BLD GHB EST-MCNC: 289 MG/DL (ref 68–126)
GLUCOSE BLOOD: 80
HBA1C MFR BLD: 11.7 % (ref ?–5.7)
HEMOGLOBIN A1C: 12.5 % (ref 4.3–5.6)
TEST STRIP LOT #: NORMAL NUMERIC

## 2023-03-20 PROCEDURE — 99214 OFFICE O/P EST MOD 30 MIN: CPT | Performed by: INTERNAL MEDICINE

## 2023-03-20 PROCEDURE — 83036 HEMOGLOBIN GLYCOSYLATED A1C: CPT | Performed by: INTERNAL MEDICINE

## 2023-03-20 PROCEDURE — 3077F SYST BP >= 140 MM HG: CPT | Performed by: INTERNAL MEDICINE

## 2023-03-20 PROCEDURE — 82947 ASSAY GLUCOSE BLOOD QUANT: CPT | Performed by: INTERNAL MEDICINE

## 2023-03-20 PROCEDURE — 83036 HEMOGLOBIN GLYCOSYLATED A1C: CPT

## 2023-03-20 PROCEDURE — 36415 COLL VENOUS BLD VENIPUNCTURE: CPT

## 2023-03-20 PROCEDURE — 3078F DIAST BP <80 MM HG: CPT | Performed by: INTERNAL MEDICINE

## 2023-03-20 PROCEDURE — 3046F HEMOGLOBIN A1C LEVEL >9.0%: CPT | Performed by: INTERNAL MEDICINE

## 2023-03-20 RX ORDER — IRBESARTAN AND HYDROCHLOROTHIAZIDE 150; 12.5 MG/1; MG/1
2 TABLET, FILM COATED ORAL DAILY
Qty: 180 TABLET | Refills: 1 | Status: SHIPPED | OUTPATIENT
Start: 2023-03-20

## 2023-03-24 RX ORDER — METOPROLOL SUCCINATE 100 MG/1
100 TABLET, EXTENDED RELEASE ORAL DAILY
Qty: 102 TABLET | Refills: 0 | Status: SHIPPED | OUTPATIENT
Start: 2023-03-24

## 2023-03-24 RX ORDER — PEN NEEDLE, DIABETIC 31 GX5/16"
NEEDLE, DISPOSABLE MISCELLANEOUS
Qty: 100 EACH | Refills: 3 | Status: SHIPPED | OUTPATIENT
Start: 2023-03-24

## 2023-04-05 ENCOUNTER — TELEPHONE (OUTPATIENT)
Dept: ENDOCRINOLOGY CLINIC | Facility: CLINIC | Age: 53
End: 2023-04-05

## 2023-04-05 ENCOUNTER — PATIENT MESSAGE (OUTPATIENT)
Dept: ENDOCRINOLOGY CLINIC | Facility: CLINIC | Age: 53
End: 2023-04-05

## 2023-04-05 RX ORDER — INSULIN PMP CART,AUT,G6/7,CNTR
1 EACH SUBCUTANEOUS AS DIRECTED
Qty: 1 KIT | Refills: 0 | Status: SHIPPED | OUTPATIENT
Start: 2023-04-05

## 2023-04-05 RX ORDER — INSULIN PMP CART,AUT,G6/7,CNTR
1 EACH SUBCUTANEOUS
Qty: 9 EACH | Refills: 0 | Status: SHIPPED | OUTPATIENT
Start: 2023-04-05

## 2023-04-05 NOTE — TELEPHONE ENCOUNTER
Pt would like to move forward with Omnipod 5. Rx pended for Dr Abimbola Boyle for q48hr pod changes.  Routed to PA department    Medication PA Requested: Omnipod 5 Pods                                                          CoverMyMeds Used:  Key:  Quantity: 9 boxes of 5 pods in each for a total of 45 pods  Day Supply: 90 days  Sig: Change pod every 48 hours  DX Code:   E10.9                                  CPT code (if applicable):   Case Number/Pending Ref#:    Medication PA Requested:  Ominpod 5 Intro Kit                                                       CoverMyMeds Used:  Key:  Quantity: 1  Day Supply: 365 days  Sig: Use as directed for insulin pump  DX Code:   E10.9                                  CPT code (if applicable):   Case Number/Pending Ref#:

## 2023-04-05 NOTE — TELEPHONE ENCOUNTER
Dr Virgene Ganser,    Pt would like to move forward with Omnipod 5. Pended rx for pods (q48hr) and intro kit.     Current Regimen:  Lantus 56 units daily  Humalog 16/18/10 + 1:10> 180 mg/dl  TDD: 108 units  Advised to restart Dexcom    Refer to TE 4/5/2023 for PA

## 2023-04-05 NOTE — TELEPHONE ENCOUNTER
From: Margoth Balbuena  To: Beka Santos MD  Sent: 4/5/2023 9:45 AM CDT  Subject: Insulin Pump    Gone thru info re: pumps and I am interested in starting on the Omnipod 5. Let me know about next steps and how soon I can get started. Thanks!

## 2023-04-10 RX ORDER — PROCHLORPERAZINE 25 MG/1
1 SUPPOSITORY RECTAL
Qty: 1 EACH | Refills: 3 | Status: SHIPPED | OUTPATIENT
Start: 2023-04-10

## 2023-04-10 NOTE — TELEPHONE ENCOUNTER
LOV: 3/20/2023  RTC: 4M, no upcoming  Last refill: 3/14/2022    Dr. Irasema Jose review and sign pended prescription if agreeable.

## 2023-04-12 ENCOUNTER — HOSPITAL ENCOUNTER (OUTPATIENT)
Age: 53
Discharge: HOME | End: 2023-04-12
Payer: COMMERCIAL

## 2023-04-12 VITALS
SYSTOLIC BLOOD PRESSURE: 141 MMHG | TEMPERATURE: 98 F | RESPIRATION RATE: 18 BRPM | HEART RATE: 95 BPM | DIASTOLIC BLOOD PRESSURE: 70 MMHG | OXYGEN SATURATION: 98 %
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
## 2023-04-12 PROCEDURE — 99213 OFFICE O/P EST LOW 20 MIN: CPT | Performed by: NURSE PRACTITIONER

## 2023-04-12 RX ORDER — AMOXICILLIN AND CLAVULANATE POTASSIUM 875; 125 MG/1; MG/1
1 TABLET, FILM COATED ORAL 2 TIMES DAILY
Qty: 20 TABLET | Refills: 0 | Status: SHIPPED | OUTPATIENT
Start: 2023-04-12 | End: 2023-04-22

## 2023-04-12 NOTE — DISCHARGE INSTRUCTIONS
Augmentin 1 tablet twice a day for 10 days, take with food  Keep area clean and dry  If you develop a fever, swelling, drainage, or any new or worsening symptoms please go to the ER

## 2023-04-12 NOTE — ED INITIAL ASSESSMENT (HPI)
Pt reports his cat scratched and bit his nose this morning. No bleeding at present, pt is on blood thinners.

## 2023-04-27 ENCOUNTER — HOSPITAL ENCOUNTER (OUTPATIENT)
Dept: ENDOCRINOLOGY | Facility: HOSPITAL | Age: 53
Discharge: HOME | End: 2023-04-27
Attending: INTERNAL MEDICINE
Payer: COMMERCIAL

## 2023-04-27 VITALS — WEIGHT: 233.19 LBS | BODY MASS INDEX: 32 KG/M2

## 2023-04-27 DIAGNOSIS — E10.65 TYPE 1 DIABETES MELLITUS WITH HYPEROSMOLARITY, UNCONTROLLED (HCC): Primary | ICD-10-CM

## 2023-04-27 DIAGNOSIS — E10.69 TYPE 1 DIABETES MELLITUS WITH HYPEROSMOLARITY, UNCONTROLLED (HCC): Primary | ICD-10-CM

## 2023-04-27 DIAGNOSIS — E87.0 TYPE 1 DIABETES MELLITUS WITH HYPEROSMOLARITY, UNCONTROLLED (HCC): Primary | ICD-10-CM

## 2023-04-27 NOTE — PROGRESS NOTES
Bernardino Clarke  : 1970 was seen for Initial Individual Pre-Pump Klaudia Peters    Date: 2023   Start time: 3:00 pm End time: 3:45 pm    Kelly Pedro has worn an insulin pump previously and is familiar with use of bolus calculator. Discussed how AID pump system assists with blood sugar management. Currently is does not have long acting insulin and is providing frequent rapid acting insulin boluses to control his blood sugar. We discussed the use of the pump controller due to his current I phone. He was hopeful to utilize everything on his phone however he will charge controller and bring to appointment. He currently wears a Dexcom G6 and we discussed how the POD and Sensor need to communicate with each other on the same plane of the body. He will be changing his Dexcom Sensor this weekend and may change sensor location. Assessment:    Counts carbs accurately?: No  He is familiar with carbohydrate counting. Using carb to insulin ratio correctly?: No    Correctly using alternate method of matching mealtime insulin to food?: Yes Administers Humalog  for meals  Using correction factor/ISF correctly? Yes  Monitoring sugar with CGM or via blood sugar meter at least QID? Yes  Keeping detailed records? No    Aware of proper troubleshooting for hypo - and hyperglycemia? Yes    Teaching Content/Education:     Reviewed carb counting and label reading. Provided him with carbohydrate choice handout to use and discussed utilization of phone dong's to assist with carb counting. Reviewed carb to insulin ratio  Reviewed correction factor/ISF. Currently he utilizes a 1:10 CF   Reminded to test QID or review/scan blood glucose QID  Reminded to keep records and bring to pump initiation  Taught pre-pump guidelines   Advised Tate Mercado to use vendor training resources    Patient Goals/Recommendations: Patient chosen goals included in patient's instructions for today.       Pump Initiation scheduled for 5/1/2023. Patient verbalized understanding and has no further questions at this time.     Tova Amin RN

## 2023-04-27 NOTE — PATIENT INSTRUCTIONS
Goals       1. EDC - Monitoring (pt-stated)       Note created  4/27/2023  3:54 PM by Sheree Marquez RN      Provide food, insulin and blood sugar records at next appointment.

## 2023-05-01 ENCOUNTER — TELEPHONE (OUTPATIENT)
Dept: ENDOCRINOLOGY CLINIC | Facility: CLINIC | Age: 53
End: 2023-05-01

## 2023-05-01 ENCOUNTER — HOSPITAL ENCOUNTER (OUTPATIENT)
Dept: ENDOCRINOLOGY | Facility: HOSPITAL | Age: 53
End: 2023-05-01
Attending: INTERNAL MEDICINE
Payer: COMMERCIAL

## 2023-05-01 DIAGNOSIS — E87.0 TYPE 1 DIABETES MELLITUS WITH HYPEROSMOLARITY, UNCONTROLLED (HCC): Primary | ICD-10-CM

## 2023-05-01 DIAGNOSIS — E10.65 TYPE 1 DIABETES MELLITUS WITH HYPEROSMOLARITY, UNCONTROLLED (HCC): Primary | ICD-10-CM

## 2023-05-01 DIAGNOSIS — E10.69 TYPE 1 DIABETES MELLITUS WITH HYPEROSMOLARITY, UNCONTROLLED (HCC): Primary | ICD-10-CM

## 2023-05-01 NOTE — PROGRESS NOTES
Yolande Echavarria  : 1970 was seen for Initial Individual Pump Start Education:  Date: 2023  Referring Provider: Steffen Jolley    Start time: 3P End time: 455P    Pt out of Lantus insulin for the past week, has been using rapid acting only. Used logs, prior dosing and weight based dosing to calculate settings today. Pt with low blood sugar today; replaced sensor prior to visit and when it was out of warm up period, reading 60. Did not come back up after 6 glucose tabs. Purchased juice and blood sugar back up prior to leaving appt. Assessment:     Insulin Pump Brand: Omnipod  Basic programming of all settings reviewed and entered into pump. See training checklist in patient chart. Basal rate (s): 1.5 units/hr  Max Basal: 3.2 units/hr  I:C: 1:6  Target Glucose: 110  Max Bolus: 25, decided by patient  CF: 1:20  Active Insulin Time: 3 hours  Infusion Sets:   Infusion Set/Pod location placed lower left abd on 2023     Patient skills:    Taught concept of insulin pump therapy   Reviewed utilization of insulin to carbohydrate ratio and correction factor   Reviewed difference between basal and bolus insulin  Verbalized understanding of prevention/treatment for Hyperglycemia and Hypoglycemia with insulin pump therapy  Discussed when to call the 1-800 number for pump problems, diabetes educator, or  physician. Practiced filling reservoir / inserting infusion set/POD    Comments:     Patient verbalized understanding and has no further questions at this time. Patient Goals/Recommendations: Patient chosen goals included in patient's instructions for today. Patient Chosen Goals:   1. Check in with educator over the next 3 days      2. Keep glucose tabs (4 glucose tabs for sugar less than 70) with you, in case of a low blood sugar   3. Twice in doubt, take it out (follow high blood sugar protocol provided at visit)   4. Check in with blood sugars at least 3-4x per day.  Enter carbs, blood sugars into insulin pump     Plan:   Odette Shen to use vendor training resources for reinforcement. Follow up appointment set for 5/8/2023 Hernan Renteria RN. Patient verbalized understanding and has no further questions at this time.        Estuardo Matias RD

## 2023-05-01 NOTE — PATIENT INSTRUCTIONS
Patient Chosen Goals:   1. Check in with educator over the next 3 days      2. Keep glucose tabs (4 glucose tabs for sugar less than 70) with you, in case of a low blood sugar   3. Twice in doubt, take it out (follow high blood sugar protocol provided at visit)   4. Check in with blood sugars at least 3-4x per day. Enter carbs, blood sugars into insulin pump     Make follow up appt with Dr. Elsy pSarks will message her office and please check in with her office regarding a letter for travel with omnipod/Dexcom.

## 2023-05-03 ENCOUNTER — TELEPHONE (OUTPATIENT)
Dept: ENDOCRINOLOGY | Facility: HOSPITAL | Age: 53
End: 2023-05-03

## 2023-05-03 NOTE — TELEPHONE ENCOUNTER
Called pt to follow up; reports lows have resolved and without question. Pt is pleased that he has not had to change site yet. Has not tried activity yet, and is waiting to do so until more comfortable with insulin pump. Will follow up as scheduled with [facility].

## 2023-05-08 ENCOUNTER — TELEPHONE (OUTPATIENT)
Dept: ENDOCRINOLOGY CLINIC | Facility: CLINIC | Age: 53
End: 2023-05-08

## 2023-05-08 ENCOUNTER — HOSPITAL ENCOUNTER (OUTPATIENT)
Dept: ENDOCRINOLOGY | Facility: HOSPITAL | Age: 53
End: 2023-05-08
Attending: INTERNAL MEDICINE
Payer: COMMERCIAL

## 2023-05-08 DIAGNOSIS — E10.65 TYPE 1 DIABETES MELLITUS WITH HYPEROSMOLARITY, UNCONTROLLED (HCC): Primary | ICD-10-CM

## 2023-05-08 DIAGNOSIS — E87.0 TYPE 1 DIABETES MELLITUS WITH HYPEROSMOLARITY, UNCONTROLLED (HCC): Primary | ICD-10-CM

## 2023-05-08 DIAGNOSIS — E10.69 TYPE 1 DIABETES MELLITUS WITH HYPEROSMOLARITY, UNCONTROLLED (HCC): Primary | ICD-10-CM

## 2023-05-08 NOTE — PROGRESS NOTES
Galen Severin  : 1970 was seen for Individual Insulin Pump Follow up:OmniPod 5    Date: 2023   Start time: 1:00 pm End time: 1:35 pm    Lisa Adan indicates he is experiencing blood sugar spikes after meals. He is administering insulin boluses 15 minutes prior to meals however will likely change to providing bolus 20 minutes prior to meals and snacks. He did experience 2 hypoglycemia events related to exercise. Discussed the use of Activity mode to assist with stopping hypoglycemia. He states it is difficult to determine when he is exercising however he will attempt to utilize this mode. He is entering CHO for meals however he is adding additional CHO amount to assist with blood sugar spike after meals. Discussed goals of 180 mg/dL or less 2 hours after meals. Encouraged him to provide correction dose of insulin via pump 2.5-3 hours after meals if recommended by pump. Review of pump download with him indicates he experiencing lowering blood sugar values around 3 AM which may be related to increase in basal rate due to elevated blood sugar values after dinner. Discussed strengthening CF and providing correction insulin bolus prior to bed if indicated by pump. Reviewed pump settings:   Basal rate(s): 12 MN 1.5 units/hr--may need adjustment to match basal needs in Automated mode. Will review at next visit. I:C 12 MN 6     CF: 12 MN 20 mg/dL   Target goals: 12  mg/dL correct above 110 mg/dL   Active insulin time: 3 ours   Hybrid closed loop system? Yes   If yes, % of time: 100    Evaluated pump download:  Average Sensor Glucose (mg/dL): 177  Standard Deviation: +/- 53     56*% Time in range  1*% Time below range  43*% Time above range (9% > 250 mg/dL)    TDD: 75  %TDD Basal: 55  %TDD Bolus: 45    Overriding pump?  No    RECOMMENDATIONS:  Provide insulin pump correction dose 2.5-3 hours after meals if indicated by pump  Provide correction insulin pump correction dose at bedtime if indicated by pump  Review total insulin left in POD prior to sleep    Pump setting changes:  I:C change: 12 MN 5, CF change: 6 AM 18 mg/dL; 6:00 pm 16 mg/dL and Active insulin time 2.5 hours    Reviewed pump back up plan. Patient Goals/Recommendations: Patient chosen goals included in patient's instructions for today. Plan: Follow up appt set for: 6/12/2023 Stefani Proctor RN  Follow up recommended in: No follow-ups on file. Patient verbalized understanding and has no further questions at this time.     Anastasia Sims RN

## 2023-05-08 NOTE — TELEPHONE ENCOUNTER
Dr. Tom Portillo,     Will the appointment be with you or with [facility]? If it's going to be with you, ok to offer res24 spots?

## 2023-06-25 ENCOUNTER — HOSPITAL ENCOUNTER (OUTPATIENT)
Age: 53
Discharge: HOME | End: 2023-06-25
Payer: COMMERCIAL

## 2023-06-25 VITALS
DIASTOLIC BLOOD PRESSURE: 79 MMHG | HEART RATE: 94 BPM | SYSTOLIC BLOOD PRESSURE: 143 MMHG | TEMPERATURE: 98 F | HEIGHT: 72 IN | BODY MASS INDEX: 30.48 KG/M2 | RESPIRATION RATE: 18 BRPM | WEIGHT: 225 LBS | OXYGEN SATURATION: 97 %

## 2023-06-25 DIAGNOSIS — J01.41 ACUTE RECURRENT PANSINUSITIS: Primary | ICD-10-CM

## 2023-06-25 RX ORDER — AMOXICILLIN AND CLAVULANATE POTASSIUM 875; 125 MG/1; MG/1
1 TABLET, FILM COATED ORAL 2 TIMES DAILY
Qty: 20 TABLET | Refills: 0 | Status: SHIPPED | OUTPATIENT
Start: 2023-06-25 | End: 2023-07-05

## 2023-06-25 NOTE — ED INITIAL ASSESSMENT (HPI)
Pt with sinus pressure and congestion since the 15th of June. + yellow/brown mucus and drainage.  Denies fever/chills

## 2023-06-25 NOTE — DISCHARGE INSTRUCTIONS
Augmentin 1 tablet twice per day for 10 days with food  Flonase nasal spray, 2 sprays in each nostril daily for 2 weeks  Carina Clacarissaer Sinus rinse, available over the counter, do this 2-3 times per day  Push fluids  Steam showers  If you develop facial swelling, chest pain, shortness of breath or any new/worsening symptoms, return or go to the emergency room  If no improvement in 1 week, please see your primary care provider

## 2023-06-26 ENCOUNTER — OFFICE VISIT (OUTPATIENT)
Dept: ENDOCRINOLOGY CLINIC | Facility: CLINIC | Age: 53
End: 2023-06-26

## 2023-06-26 ENCOUNTER — LAB ENCOUNTER (OUTPATIENT)
Dept: LAB | Facility: HOSPITAL | Age: 53
End: 2023-06-26
Attending: INTERNAL MEDICINE
Payer: COMMERCIAL

## 2023-06-26 VITALS — WEIGHT: 234 LBS | BODY MASS INDEX: 32 KG/M2

## 2023-06-26 DIAGNOSIS — E10.65 DM (DIABETES MELLITUS), TYPE 1, UNCONTROLLED, WITH HYPEROSMOLARITY (HCC): Primary | ICD-10-CM

## 2023-06-26 DIAGNOSIS — E87.0 DM (DIABETES MELLITUS), TYPE 1, UNCONTROLLED, WITH HYPEROSMOLARITY (HCC): Primary | ICD-10-CM

## 2023-06-26 DIAGNOSIS — E10.69 DM (DIABETES MELLITUS), TYPE 1, UNCONTROLLED, WITH HYPEROSMOLARITY (HCC): Primary | ICD-10-CM

## 2023-06-26 DIAGNOSIS — E87.0 DM (DIABETES MELLITUS), TYPE 1, UNCONTROLLED, WITH HYPEROSMOLARITY (HCC): ICD-10-CM

## 2023-06-26 DIAGNOSIS — E10.69 DM (DIABETES MELLITUS), TYPE 1, UNCONTROLLED, WITH HYPEROSMOLARITY (HCC): ICD-10-CM

## 2023-06-26 DIAGNOSIS — E10.65 DM (DIABETES MELLITUS), TYPE 1, UNCONTROLLED, WITH HYPEROSMOLARITY (HCC): ICD-10-CM

## 2023-06-26 LAB
ALBUMIN SERPL-MCNC: 3.8 G/DL (ref 3.4–5)
ALBUMIN/GLOB SERPL: 1 {RATIO} (ref 1–2)
ALP LIVER SERPL-CCNC: 93 U/L
ALT SERPL-CCNC: 31 U/L
ANION GAP SERPL CALC-SCNC: 7 MMOL/L (ref 0–18)
AST SERPL-CCNC: 18 U/L (ref 15–37)
BASOPHILS # BLD AUTO: 0.06 X10(3) UL (ref 0–0.2)
BASOPHILS NFR BLD AUTO: 0.7 %
BILIRUB SERPL-MCNC: 0.6 MG/DL (ref 0.1–2)
BUN BLD-MCNC: 19 MG/DL (ref 7–18)
BUN/CREAT SERPL: 12.7 (ref 10–20)
CALCIUM BLD-MCNC: 9.7 MG/DL (ref 8.5–10.1)
CARTRIDGE LOT#: ABNORMAL NUMERIC
CHLORIDE SERPL-SCNC: 102 MMOL/L (ref 98–112)
CHOLEST SERPL-MCNC: 161 MG/DL (ref ?–200)
CO2 SERPL-SCNC: 27 MMOL/L (ref 21–32)
CREAT BLD-MCNC: 1.5 MG/DL
CREAT UR-SCNC: 71.6 MG/DL
DEPRECATED RDW RBC AUTO: 39.3 FL (ref 35.1–46.3)
EOSINOPHIL # BLD AUTO: 0.5 X10(3) UL (ref 0–0.7)
EOSINOPHIL NFR BLD AUTO: 6 %
ERYTHROCYTE [DISTWIDTH] IN BLOOD BY AUTOMATED COUNT: 12.7 % (ref 11–15)
FASTING PATIENT LIPID ANSWER: NO
FASTING STATUS PATIENT QL REPORTED: NO
GFR SERPLBLD BASED ON 1.73 SQ M-ARVRAT: 56 ML/MIN/1.73M2 (ref 60–?)
GLOBULIN PLAS-MCNC: 3.9 G/DL (ref 2.8–4.4)
GLUCOSE BLD-MCNC: 144 MG/DL (ref 70–99)
GLUCOSE BLOOD: 186
HCT VFR BLD AUTO: 43.1 %
HDLC SERPL-MCNC: 35 MG/DL (ref 40–59)
HEMOGLOBIN A1C: 8.5 % (ref 4.3–5.6)
HGB BLD-MCNC: 14.9 G/DL
IMM GRANULOCYTES # BLD AUTO: 0.03 X10(3) UL (ref 0–1)
IMM GRANULOCYTES NFR BLD: 0.4 %
LDLC SERPL CALC-MCNC: 103 MG/DL (ref ?–100)
LYMPHOCYTES # BLD AUTO: 1.76 X10(3) UL (ref 1–4)
LYMPHOCYTES NFR BLD AUTO: 21.1 %
MCH RBC QN AUTO: 29.4 PG (ref 26–34)
MCHC RBC AUTO-ENTMCNC: 34.6 G/DL (ref 31–37)
MCV RBC AUTO: 85 FL
MICROALBUMIN UR-MCNC: 8.89 MG/DL
MICROALBUMIN/CREAT 24H UR-RTO: 124.2 UG/MG (ref ?–30)
MONOCYTES # BLD AUTO: 0.78 X10(3) UL (ref 0.1–1)
MONOCYTES NFR BLD AUTO: 9.3 %
NEUTROPHILS # BLD AUTO: 5.22 X10 (3) UL (ref 1.5–7.7)
NEUTROPHILS # BLD AUTO: 5.22 X10(3) UL (ref 1.5–7.7)
NEUTROPHILS NFR BLD AUTO: 62.5 %
NONHDLC SERPL-MCNC: 126 MG/DL (ref ?–130)
OSMOLALITY SERPL CALC.SUM OF ELEC: 287 MOSM/KG (ref 275–295)
PLATELET # BLD AUTO: 276 10(3)UL (ref 150–450)
POTASSIUM SERPL-SCNC: 3.9 MMOL/L (ref 3.5–5.1)
PROT SERPL-MCNC: 7.7 G/DL (ref 6.4–8.2)
RBC # BLD AUTO: 5.07 X10(6)UL
SODIUM SERPL-SCNC: 136 MMOL/L (ref 136–145)
TEST STRIP LOT #: NORMAL NUMERIC
TRIGL SERPL-MCNC: 128 MG/DL (ref 30–149)
TSI SER-ACNC: 2.46 MIU/ML (ref 0.36–3.74)
VLDLC SERPL CALC-MCNC: 21 MG/DL (ref 0–30)
WBC # BLD AUTO: 8.4 X10(3) UL (ref 4–11)

## 2023-06-26 PROCEDURE — 99214 OFFICE O/P EST MOD 30 MIN: CPT | Performed by: INTERNAL MEDICINE

## 2023-06-26 PROCEDURE — 82570 ASSAY OF URINE CREATININE: CPT

## 2023-06-26 PROCEDURE — 80053 COMPREHEN METABOLIC PANEL: CPT

## 2023-06-26 PROCEDURE — 3052F HG A1C>EQUAL 8.0%<EQUAL 9.0%: CPT | Performed by: INTERNAL MEDICINE

## 2023-06-26 PROCEDURE — 84443 ASSAY THYROID STIM HORMONE: CPT

## 2023-06-26 PROCEDURE — 82043 UR ALBUMIN QUANTITATIVE: CPT

## 2023-06-26 PROCEDURE — 80061 LIPID PANEL: CPT

## 2023-06-26 PROCEDURE — 85025 COMPLETE CBC W/AUTO DIFF WBC: CPT

## 2023-06-26 PROCEDURE — 82947 ASSAY GLUCOSE BLOOD QUANT: CPT | Performed by: INTERNAL MEDICINE

## 2023-06-26 PROCEDURE — 83036 HEMOGLOBIN GLYCOSYLATED A1C: CPT | Performed by: INTERNAL MEDICINE

## 2023-06-26 PROCEDURE — 36415 COLL VENOUS BLD VENIPUNCTURE: CPT

## 2023-06-26 RX ORDER — METOPROLOL SUCCINATE 100 MG/1
100 TABLET, EXTENDED RELEASE ORAL DAILY
Qty: 102 TABLET | Refills: 0 | Status: SHIPPED | OUTPATIENT
Start: 2023-06-26

## 2023-06-26 RX ORDER — AMLODIPINE BESYLATE 10 MG/1
10 TABLET ORAL DAILY
Qty: 90 TABLET | Refills: 1 | Status: SHIPPED | OUTPATIENT
Start: 2023-06-26

## 2023-06-26 RX ORDER — INSULIN LISPRO 100 [IU]/ML
INJECTION, SOLUTION INTRAVENOUS; SUBCUTANEOUS
Qty: 90 ML | Refills: 2 | Status: SHIPPED | OUTPATIENT
Start: 2023-06-26

## 2023-06-26 RX ORDER — INSULIN PMP CART,AUT,G6/7,CNTR
1 EACH SUBCUTANEOUS
Qty: 9 EACH | Refills: 0 | Status: SHIPPED | OUTPATIENT
Start: 2023-06-26

## 2023-06-26 RX ORDER — AMLODIPINE BESYLATE 10 MG/1
10 TABLET ORAL DAILY
Qty: 90 TABLET | Refills: 1 | Status: SHIPPED | OUTPATIENT
Start: 2023-06-26 | End: 2023-06-26

## 2023-06-26 RX ORDER — IRBESARTAN AND HYDROCHLOROTHIAZIDE 150; 12.5 MG/1; MG/1
2 TABLET, FILM COATED ORAL DAILY
Qty: 180 TABLET | Refills: 1 | Status: SHIPPED | OUTPATIENT
Start: 2023-06-26

## 2023-06-26 RX ORDER — PROCHLORPERAZINE 25 MG/1
1 SUPPOSITORY RECTAL CONTINUOUS
Qty: 1 EACH | Refills: 3 | Status: SHIPPED | OUTPATIENT
Start: 2023-06-26

## 2023-06-26 RX ORDER — PROCHLORPERAZINE 25 MG/1
1 SUPPOSITORY RECTAL
Qty: 9 EACH | Refills: 3 | Status: SHIPPED | OUTPATIENT
Start: 2023-06-26

## 2023-06-28 ENCOUNTER — APPOINTMENT (OUTPATIENT)
Dept: URBAN - METROPOLITAN AREA CLINIC 244 | Age: 53
Setting detail: DERMATOLOGY
End: 2023-06-29

## 2023-06-28 DIAGNOSIS — D22 MELANOCYTIC NEVI: ICD-10-CM

## 2023-06-28 DIAGNOSIS — D485 NEOPLASM OF UNCERTAIN BEHAVIOR OF SKIN: ICD-10-CM

## 2023-06-28 DIAGNOSIS — L82.1 OTHER SEBORRHEIC KERATOSIS: ICD-10-CM

## 2023-06-28 DIAGNOSIS — L81.4 OTHER MELANIN HYPERPIGMENTATION: ICD-10-CM

## 2023-06-28 DIAGNOSIS — L91.8 OTHER HYPERTROPHIC DISORDERS OF THE SKIN: ICD-10-CM

## 2023-06-28 PROBLEM — D48.5 NEOPLASM OF UNCERTAIN BEHAVIOR OF SKIN: Status: ACTIVE | Noted: 2023-06-28

## 2023-06-28 PROBLEM — D22.5 MELANOCYTIC NEVI OF TRUNK: Status: ACTIVE | Noted: 2023-06-28

## 2023-06-28 PROCEDURE — OTHER DEFER: OTHER

## 2023-06-28 PROCEDURE — OTHER ADDITIONAL NOTES: OTHER

## 2023-06-28 PROCEDURE — OTHER COUNSELING: OTHER

## 2023-06-28 PROCEDURE — 99203 OFFICE O/P NEW LOW 30 MIN: CPT

## 2023-06-28 ASSESSMENT — LOCATION DETAILED DESCRIPTION DERM
LOCATION DETAILED: INFERIOR THORACIC SPINE
LOCATION DETAILED: LEFT CENTRAL LATERAL NECK
LOCATION DETAILED: LEFT LATERAL UPPER BACK
LOCATION DETAILED: INFERIOR LUMBAR SPINE
LOCATION DETAILED: LEFT SUPERIOR MEDIAL MIDBACK
LOCATION DETAILED: LEFT AXILLARY VAULT

## 2023-06-28 ASSESSMENT — LOCATION SIMPLE DESCRIPTION DERM
LOCATION SIMPLE: UPPER BACK
LOCATION SIMPLE: LEFT LOWER BACK
LOCATION SIMPLE: LEFT AXILLARY VAULT
LOCATION SIMPLE: LOWER BACK
LOCATION SIMPLE: NECK
LOCATION SIMPLE: LEFT UPPER BACK

## 2023-06-28 ASSESSMENT — LOCATION ZONE DERM
LOCATION ZONE: AXILLAE
LOCATION ZONE: NECK
LOCATION ZONE: TRUNK

## 2023-06-28 NOTE — PROCEDURE: DEFER
Introduction Text (Please End With A Colon): The following procedure was deferred:
Instructions (Optional): Shave removal 10 min appointment
Detail Level: Detailed
X Size Of Lesion In Cm (Optional): 0

## 2023-06-30 ENCOUNTER — TELEPHONE (OUTPATIENT)
Dept: ENDOCRINOLOGY CLINIC | Facility: CLINIC | Age: 53
End: 2023-06-30

## 2023-07-03 ENCOUNTER — TELEPHONE (OUTPATIENT)
Dept: ENDOCRINOLOGY CLINIC | Facility: CLINIC | Age: 53
End: 2023-07-03

## 2023-07-03 DIAGNOSIS — E87.0 DM (DIABETES MELLITUS), TYPE 1, UNCONTROLLED, WITH HYPEROSMOLARITY: Primary | ICD-10-CM

## 2023-07-03 DIAGNOSIS — E10.65 DM (DIABETES MELLITUS), TYPE 1, UNCONTROLLED, WITH HYPEROSMOLARITY: Primary | ICD-10-CM

## 2023-07-03 DIAGNOSIS — E10.69 DM (DIABETES MELLITUS), TYPE 1, UNCONTROLLED, WITH HYPEROSMOLARITY: Primary | ICD-10-CM

## 2023-07-03 NOTE — TELEPHONE ENCOUNTER
Pooja Alexis from [facility] has questions about Humalog prescription states pt has usually been on vials and they got a different type of prescription please follow up ph # [phone] ref # [phone]

## 2023-07-05 RX ORDER — INSULIN LISPRO 100 [IU]/ML
80 INJECTION, SOLUTION INTRAVENOUS; SUBCUTANEOUS DAILY
Qty: 90 ML | Refills: 2 | Status: SHIPPED | OUTPATIENT
Start: 2023-07-05

## 2023-07-05 NOTE — TELEPHONE ENCOUNTER
Insulin Lispro (HUMALOG) 100 UNIT/ML Subcutaneous Solution Cartridge, Inject 80 units subcutaneous daily via insulin pump, Disp: 90 mL, Rfl: 2    Reason for clarification: It appears the patient is either taking both medications, or going back and forth between the two. The patient has a history of the HUMALOG 100u/ml VALS and you have prescribed the HUMALOG CARTRIDGE form (and no history of INPEN). Please review and clarify the intended dosage form below, sign and return. Thank you!

## 2023-07-20 ENCOUNTER — PATIENT MESSAGE (OUTPATIENT)
Dept: ENDOCRINOLOGY CLINIC | Facility: CLINIC | Age: 53
End: 2023-07-20

## 2023-07-20 RX ORDER — TRIAMCINOLONE ACETONIDE 5 MG/G
CREAM TOPICAL
Qty: 15 G | Refills: 1 | Status: SHIPPED | OUTPATIENT
Start: 2023-07-20

## 2023-07-20 NOTE — TELEPHONE ENCOUNTER
Maira Mckenzie - would you send him our suggestions including underpatch and using flonase to prep the site. We can also send script for triamcinolone if necessary. Thanks.

## 2023-08-29 ENCOUNTER — APPOINTMENT (OUTPATIENT)
Dept: URBAN - METROPOLITAN AREA CLINIC 244 | Age: 53
Setting detail: DERMATOLOGY
End: 2023-08-30

## 2023-08-29 DIAGNOSIS — D22 MELANOCYTIC NEVI: ICD-10-CM

## 2023-08-29 DIAGNOSIS — L91.8 OTHER HYPERTROPHIC DISORDERS OF THE SKIN: ICD-10-CM

## 2023-08-29 PROBLEM — D22.5 MELANOCYTIC NEVI OF TRUNK: Status: ACTIVE | Noted: 2023-08-29

## 2023-08-29 PROCEDURE — OTHER SHAVE REMOVAL: OTHER

## 2023-08-29 PROCEDURE — OTHER SKIN TAG REMOVAL MULTI (COSMETIC): OTHER

## 2023-08-29 PROCEDURE — 11301 SHAVE SKIN LESION 0.6-1.0 CM: CPT

## 2023-08-29 PROCEDURE — 11302 SHAVE SKIN LESION 1.1-2.0 CM: CPT

## 2023-08-29 ASSESSMENT — LOCATION DETAILED DESCRIPTION DERM
LOCATION DETAILED: RIGHT AXILLARY VAULT
LOCATION DETAILED: LEFT SUPERIOR LATERAL UPPER BACK
LOCATION DETAILED: LEFT INFERIOR MEDIAL LOWER BACK
LOCATION DETAILED: RIGHT CLAVICULAR NECK

## 2023-08-29 ASSESSMENT — LOCATION SIMPLE DESCRIPTION DERM
LOCATION SIMPLE: RIGHT ANTERIOR NECK
LOCATION SIMPLE: LEFT UPPER BACK
LOCATION SIMPLE: LEFT LOWER BACK
LOCATION SIMPLE: RIGHT AXILLARY VAULT

## 2023-08-29 ASSESSMENT — LOCATION ZONE DERM
LOCATION ZONE: NECK
LOCATION ZONE: TRUNK
LOCATION ZONE: AXILLAE

## 2023-08-29 NOTE — PROCEDURE: SHAVE REMOVAL
Post-Care Instructions: I reviewed with the patient in detail post-care instructions.\\n\\n Following the removal petrolatum and a bandage were applied. Patient will be notified of biopsy results. However, patient instructed to call the office if not contacted within 2 weeks. Reviewed with the patient in detail post-care instructions. Patient is to keep the area dry for 48 hours, and not to engage in any swimming until the area is healed. If patient does have water exposure, recommend waterproof (hydrocolloid dressing) application. Should the patient develop any fevers, chills, bleeding, severe pain, then the patient will contact the office immediately.\\n\\n** The patient was explicitly instructed to NOT apply any other topicals to the area other than plain vaseline with a clean Q-tip **. No Neosporin or topical Ab unless prescribed/recommended by the Dr is to be used.\\n\\nThe patient is aware to have the area re-evaluated if it is not healed

## 2023-08-29 NOTE — PROCEDURE: SHAVE REMOVAL
Consent was obtained from the patient. The risks and benefits were discussed in detail. Specifically, the risks of infection, scarring, scabbing, bleeding, prolonged wound healing, incomplete removal, allergy to anesthesia, nerve injury and recurrence were addressed. Prior to the procedure, the treatment site was clearly identified and confirmed by the patient. The scar produced with a shave removal  is often suboptimal and appears atrophic and white. Patient understands this and prefers shave removal instead of a punch removal or traditional excision.\\n\\nPatient aware that I cannot guarantee cosmetic outcome of biopsy and biopsy is done for medical purposes (not cosmetic).\\n\\nPatient aware that sometimes removal/biopsy results can be inconclusive and that another biopsy or procedure may be required. \\nPatient aware that another procedure may be required pending the diagnosis.

## 2023-08-29 NOTE — PROCEDURE: SKIN TAG REMOVAL MULTI (COSMETIC)
Anesthesia Type: 1% lidocaine with epinephrine
Price (Use Numbers Only, No Special Characters Or $): 147.35
Consent: Written consent obtained and the risks of skin tag removal was reviewed with the patient including but not limited to bleeding, pigmentary change, infection, pain, and remote possibility of scarring.
Detail Level: Detailed
Total Number Of Lesions Treated: 7
Anesthesia Volume In Cc: 3
Removed With: gradle excision

## 2023-09-07 RX ORDER — PEN NEEDLE, DIABETIC 31 GX5/16"
NEEDLE, DISPOSABLE MISCELLANEOUS
Qty: 100 EACH | Refills: 3 | Status: SHIPPED | OUTPATIENT
Start: 2023-09-07

## 2023-09-07 RX ORDER — INSULIN LISPRO 100 [IU]/ML
INJECTION, SOLUTION INTRAVENOUS; SUBCUTANEOUS
Qty: 75 ML | Refills: 0 | Status: SHIPPED | OUTPATIENT
Start: 2023-09-07

## 2023-09-07 RX ORDER — INSULIN GLARGINE 100 [IU]/ML
INJECTION, SOLUTION SUBCUTANEOUS
Qty: 60 ML | Refills: 0 | Status: SHIPPED | OUTPATIENT
Start: 2023-09-07

## 2023-09-07 RX ORDER — METOPROLOL SUCCINATE 100 MG/1
100 TABLET, EXTENDED RELEASE ORAL DAILY
Qty: 102 TABLET | Refills: 0 | Status: SHIPPED | OUTPATIENT
Start: 2023-09-07

## 2023-09-08 RX ORDER — INSULIN PMP CART,AUT,G6/7,CNTR
1 EACH SUBCUTANEOUS
Qty: 9 EACH | Refills: 0 | Status: SHIPPED | OUTPATIENT
Start: 2023-09-08

## 2023-09-08 RX ORDER — INSULIN LISPRO 100 [IU]/ML
INJECTION, SOLUTION INTRAVENOUS; SUBCUTANEOUS
Qty: 30 ML | Refills: 0 | Status: SHIPPED | OUTPATIENT
Start: 2023-09-08

## 2023-09-21 NOTE — TELEPHONE ENCOUNTER
Current Outpatient Medications   Medication Sig Dispense Refill    Continuous Blood Gluc Sensor (DEXCOM G6 SENSOR) Does not apply Misc 1 each Every 10 days.  9 each 3

## 2023-09-22 RX ORDER — PROCHLORPERAZINE 25 MG/1
1 SUPPOSITORY RECTAL CONTINUOUS
Qty: 3 EACH | Refills: 11 | Status: SHIPPED | OUTPATIENT
Start: 2023-09-22

## 2023-10-23 ENCOUNTER — OFFICE VISIT (OUTPATIENT)
Dept: ENDOCRINOLOGY CLINIC | Facility: CLINIC | Age: 53
End: 2023-10-23

## 2023-10-23 VITALS
HEART RATE: 103 BPM | DIASTOLIC BLOOD PRESSURE: 80 MMHG | WEIGHT: 239 LBS | SYSTOLIC BLOOD PRESSURE: 152 MMHG | BODY MASS INDEX: 32 KG/M2

## 2023-10-23 DIAGNOSIS — E10.69 DM (DIABETES MELLITUS), TYPE 1, UNCONTROLLED, WITH HYPEROSMOLARITY: Primary | ICD-10-CM

## 2023-10-23 DIAGNOSIS — E87.0 DM (DIABETES MELLITUS), TYPE 1, UNCONTROLLED, WITH HYPEROSMOLARITY: Primary | ICD-10-CM

## 2023-10-23 DIAGNOSIS — E10.65 DM (DIABETES MELLITUS), TYPE 1, UNCONTROLLED, WITH HYPEROSMOLARITY: Primary | ICD-10-CM

## 2023-10-23 LAB
CARTRIDGE LOT#: ABNORMAL NUMERIC
GLUCOSE BLOOD: 153
HEMOGLOBIN A1C: 8.3 % (ref 4.3–5.6)
TEST STRIP LOT #: NORMAL NUMERIC

## 2023-10-23 RX ORDER — INSULIN LISPRO 100 [IU]/ML
INJECTION, SOLUTION INTRAVENOUS; SUBCUTANEOUS
Qty: 75 ML | Refills: 1 | Status: SHIPPED | OUTPATIENT
Start: 2023-10-23

## 2023-10-23 RX ORDER — INSULIN GLARGINE-YFGN 100 [IU]/ML
56 INJECTION, SOLUTION SUBCUTANEOUS DAILY
Qty: 60 ML | Refills: 1 | Status: SHIPPED | OUTPATIENT
Start: 2023-10-23

## 2023-10-23 RX ORDER — PEN NEEDLE, DIABETIC 31 GX5/16"
NEEDLE, DISPOSABLE MISCELLANEOUS
Qty: 100 EACH | Refills: 3 | Status: SHIPPED | OUTPATIENT
Start: 2023-10-23

## 2023-10-23 NOTE — PATIENT INSTRUCTIONS
CHANGE Semglee to 32 units subcutaneous 2 times per day    INSULIN SLIDING SCALE  Base Values  Breakfast: 20  Lunch: 20  Dinner: 20  Ranges:  80-99: -2  100-119: 0  120-139: 0  140-159: 1  160-179: 2  180-199: 3  200-219: 4  220-239: 5  240-259: 6  260-279: 7  280-299: 8  300-319: 9  320-339: 10  340-359: 11  360-379: 12

## 2023-10-30 ENCOUNTER — HOSPITAL ENCOUNTER (OUTPATIENT)
Age: 53
Discharge: HOME | End: 2023-10-30

## 2023-10-30 VITALS
SYSTOLIC BLOOD PRESSURE: 162 MMHG | HEART RATE: 89 BPM | DIASTOLIC BLOOD PRESSURE: 90 MMHG | OXYGEN SATURATION: 97 % | TEMPERATURE: 97 F | RESPIRATION RATE: 18 BRPM

## 2023-10-30 DIAGNOSIS — R03.0 ELEVATED BLOOD PRESSURE READING: ICD-10-CM

## 2023-10-30 DIAGNOSIS — H00.015 HORDEOLUM EXTERNUM OF LEFT LOWER EYELID: Primary | ICD-10-CM

## 2023-10-30 PROCEDURE — 99213 OFFICE O/P EST LOW 20 MIN: CPT | Performed by: PHYSICIAN ASSISTANT

## 2023-10-30 RX ORDER — ERYTHROMYCIN 5 MG/G
1 OINTMENT OPHTHALMIC
Qty: 1 G | Refills: 0 | Status: SHIPPED | OUTPATIENT
Start: 2023-10-30 | End: 2023-11-09

## 2023-10-30 NOTE — ED INITIAL ASSESSMENT (HPI)
Pt reports left eye stye beginning last week. Noticed some pus coming out, has tried warm compresses, feels like it is getting worse.

## 2024-01-17 RX ORDER — PEN NEEDLE, DIABETIC 31 GX5/16"
NEEDLE, DISPOSABLE MISCELLANEOUS
Qty: 100 EACH | Refills: 3 | Status: SHIPPED | OUTPATIENT
Start: 2024-01-17

## 2024-02-05 ENCOUNTER — APPOINTMENT (OUTPATIENT)
Dept: URBAN - METROPOLITAN AREA CLINIC 244 | Age: 54
Setting detail: DERMATOLOGY
End: 2024-02-05

## 2024-02-05 DIAGNOSIS — L738 OTHER SPECIFIED DISEASES OF HAIR AND HAIR FOLLICLES: ICD-10-CM

## 2024-02-05 DIAGNOSIS — L663 OTHER SPECIFIED DISEASES OF HAIR AND HAIR FOLLICLES: ICD-10-CM

## 2024-02-05 PROBLEM — L02.821 FURUNCLE OF HEAD [ANY PART, EXCEPT FACE]: Status: ACTIVE | Noted: 2024-02-05

## 2024-02-05 PROCEDURE — OTHER COUNSELING: OTHER

## 2024-02-05 PROCEDURE — OTHER PRESCRIPTION: OTHER

## 2024-02-05 PROCEDURE — 99214 OFFICE O/P EST MOD 30 MIN: CPT

## 2024-02-05 PROCEDURE — OTHER PRESCRIPTION MEDICATION MANAGEMENT: OTHER

## 2024-02-05 RX ORDER — DOXYCYCLINE 100 MG/1
CAPSULE ORAL
Qty: 60 | Refills: 1 | Status: ERX | COMMUNITY
Start: 2024-02-05

## 2024-02-05 RX ORDER — CLINDAMYCIN PHOSPHATE 10 MG/ML
SOLUTION TOPICAL
Qty: 120 | Refills: 11 | Status: ERX | COMMUNITY
Start: 2024-02-05

## 2024-02-05 RX ORDER — CLINDAMYCIN PHOSPHATE 10 MG/ML
SOLUTION TOPICAL
Qty: 120 | Refills: 11 | Status: ERX

## 2024-02-05 RX ORDER — DOXYCYCLINE 100 MG/1
CAPSULE ORAL
Qty: 60 | Refills: 1 | Status: ERX

## 2024-02-05 ASSESSMENT — LOCATION DETAILED DESCRIPTION DERM: LOCATION DETAILED: POSTERIOR MID-PARIETAL SCALP

## 2024-02-05 ASSESSMENT — LOCATION ZONE DERM: LOCATION ZONE: SCALP

## 2024-02-05 ASSESSMENT — LOCATION SIMPLE DESCRIPTION DERM: LOCATION SIMPLE: POSTERIOR SCALP

## 2024-02-05 NOTE — PROCEDURE: PRESCRIPTION MEDICATION MANAGEMENT
Render In Strict Bullet Format?: No
Initiate Treatment: Clindamycin Sol BID, Doxy 100mg BID, CLN QD
Detail Level: Zone

## 2024-02-16 ENCOUNTER — TELEPHONE (OUTPATIENT)
Dept: ENDOCRINOLOGY CLINIC | Facility: CLINIC | Age: 54
End: 2024-02-16

## 2024-02-16 RX ORDER — AMLODIPINE BESYLATE 10 MG/1
10 TABLET ORAL DAILY
Qty: 90 TABLET | Refills: 1 | Status: SHIPPED | OUTPATIENT
Start: 2024-02-16

## 2024-02-16 RX ORDER — INSULIN GLARGINE-YFGN 100 [IU]/ML
56 INJECTION, SOLUTION SUBCUTANEOUS DAILY
Qty: 60 ML | Refills: 1 | Status: SHIPPED | OUTPATIENT
Start: 2024-02-16

## 2024-02-16 RX ORDER — INSULIN LISPRO 100 [IU]/ML
20 INJECTION, SOLUTION INTRAVENOUS; SUBCUTANEOUS 3 TIMES DAILY
Qty: 45 ML | Refills: 1 | Status: SHIPPED | OUTPATIENT
Start: 2024-02-16

## 2024-02-16 RX ORDER — PEN NEEDLE, DIABETIC 31 GX5/16"
NEEDLE, DISPOSABLE MISCELLANEOUS
Qty: 100 EACH | Refills: 3 | Status: SHIPPED | OUTPATIENT
Start: 2024-02-16

## 2024-02-16 NOTE — TELEPHONE ENCOUNTER
Patient states that he has a new Mail Order pharmacy now, and needs to get new prescriptions for the Humalog Kwikpen and Semglee Insulin sent to [facility] - phone # [phone].     Current Outpatient Medications   Medication Sig Dispense Refill           Insulin Glargine-yfgn (SEMGLEE, YFGN,) 100 UNIT/ML Subcutaneous Solution Pen-injector Inject 56 Units into the skin daily. 60 mL 1    Insulin Lispro, 1 Unit Dial, (HUMALOG KWIKPEN) 100 UNIT/ML Subcutaneous Solution Pen-injector INJECT 16 UNITS WITH  BREAKFAST 18 UNITS WITH  LUNCH 18 UNITS WITH DINNER  ALONG WITH SLIDING SCALE.  MAX DAILY DOSE: 80 UNITS 75 mL 1

## 2024-02-16 NOTE — TELEPHONE ENCOUNTER
LOV: 10/23/23    RTC: 4 Months      Called patient to help schedule a follow up appointment,  no answer, lmtcb.     Patient called back and appointment was scheduled for 6/24/24

## 2024-03-04 ENCOUNTER — APPOINTMENT (OUTPATIENT)
Dept: URBAN - METROPOLITAN AREA CLINIC 244 | Age: 54
Setting detail: DERMATOLOGY
End: 2024-03-05

## 2024-03-04 DIAGNOSIS — L66.1 LICHEN PLANOPILARIS: ICD-10-CM

## 2024-03-04 PROBLEM — L30.9 DERMATITIS, UNSPECIFIED: Status: ACTIVE | Noted: 2024-03-04

## 2024-03-04 PROCEDURE — OTHER MEDICATION COUNSELING: OTHER

## 2024-03-04 PROCEDURE — 99214 OFFICE O/P EST MOD 30 MIN: CPT

## 2024-03-04 PROCEDURE — OTHER PRESCRIPTION MEDICATION MANAGEMENT: OTHER

## 2024-03-04 PROCEDURE — OTHER COUNSELING: OTHER

## 2024-03-04 ASSESSMENT — LOCATION ZONE DERM: LOCATION ZONE: SCALP

## 2024-03-04 ASSESSMENT — LOCATION DETAILED DESCRIPTION DERM: LOCATION DETAILED: POSTERIOR MID-PARIETAL SCALP

## 2024-03-04 ASSESSMENT — LOCATION SIMPLE DESCRIPTION DERM: LOCATION SIMPLE: POSTERIOR SCALP

## 2024-03-04 NOTE — PROCEDURE: PRESCRIPTION MEDICATION MANAGEMENT
Detail Level: Zone
Render In Strict Bullet Format?: No
Plan: Doxycycline for one more month. Pt will begin maintenance with only shampoo and Clindamycin after.
Continue Regimen: Clindamycin Sol BID, Doxy 100mg BID, CLN QD.

## 2024-03-04 NOTE — PROCEDURE: MEDICATION COUNSELING
Aklief Pregnancy And Lactation Text: It is unknown if this medication is safe to use during pregnancy.  It is unknown if this medication is excreted in breast milk.  Breastfeeding women should use the topical cream on the smallest area of the skin for the shortest time needed while breastfeeding.  Do not apply to nipple and areola.
Niacinamide Counseling: I recommended taking niacin or niacinamide, also know as vitamin B3, twice daily. Recent evidence suggests that taking vitamin B3 (500 mg twice daily) can reduce the risk of actinic keratoses and non-melanoma skin cancers. Side effects of vitamin B3 include flushing and headache.
Topical Retinoid Pregnancy And Lactation Text: This medication is Pregnancy Category C. It is unknown if this medication is excreted in breast milk.
Winlevi Counseling:  I discussed with the patient the risks of topical clascoterone including but not limited to erythema, scaling, itching, and stinging. Patient voiced their understanding.
Tetracycline Counseling: Patient counseled regarding possible photosensitivity and increased risk for sunburn.  Patient instructed to avoid sunlight, if possible.  When exposed to sunlight, patients should wear protective clothing, sunglasses, and sunscreen.  The patient was instructed to call the office immediately if the following severe adverse effects occur:  hearing changes, easy bruising/bleeding, severe headache, or vision changes.  The patient verbalized understanding of the proper use and possible adverse effects of tetracycline.  All of the patient's questions and concerns were addressed. Patient understands to avoid pregnancy while on therapy due to potential birth defects.
Rituxan Pregnancy And Lactation Text: This medication is Pregnancy Category C and it isn't know if it is safe during pregnancy. It is unknown if this medication is excreted in breast milk but similar antibodies are known to be excreted.
Clindamycin Pregnancy And Lactation Text: This medication can be used in pregnancy if certain situations. Clindamycin is also present in breast milk.
Finasteride Pregnancy And Lactation Text: This medication is absolutely contraindicated during pregnancy. It is unknown if it is excreted in breast milk.
Arava Pregnancy And Lactation Text: This medication is Pregnancy Category X and is absolutely contraindicated during pregnancy. It is unknown if it is excreted in breast milk.
Doxepin Pregnancy And Lactation Text: This medication is Pregnancy Category C and it isn't known if it is safe during pregnancy. It is also excreted in breast milk and breast feeding isn't recommended.
Opzelura Counseling:  I discussed with the patient the risks of Opzelura including but not limited to nasopharngitis, bronchitis, ear infection, eosinophila, hives, diarrhea, folliculitis, tonsillitis, and rhinorrhea.  Taken orally, this medication has been linked to serious infections; higher rate of mortality; malignancy and lymphoproliferative disorders; major adverse cardiovascular events; thrombosis; thrombocytopenia, anemia, and neutropenia; and lipid elevations.
Winlevi Pregnancy And Lactation Text: This medication is considered safe during pregnancy and breastfeeding.
Clofazimine Counseling:  I discussed with the patient the risks of clofazimine including but not limited to skin and eye pigmentation, liver damage, nausea/vomiting, gastrointestinal bleeding and allergy.
Hydroxyzine Counseling: Patient advised that the medication is sedating and not to drive a car after taking this medication.  Patient informed of potential adverse effects including but not limited to dry mouth, urinary retention, and blurry vision.  The patient verbalized understanding of the proper use and possible adverse effects of hydroxyzine.  All of the patient's questions and concerns were addressed.
Thalidomide Counseling: I discussed with the patient the risks of thalidomide including but not limited to birth defects, anxiety, weakness, chest pain, dizziness, cough and severe allergy.
Azelaic Acid Counseling: Patient counseled that medicine may cause skin irritation and to avoid applying near the eyes.  In the event of skin irritation, the patient was advised to reduce the amount of the drug applied or use it less frequently.   The patient verbalized understanding of the proper use and possible adverse effects of azelaic acid.  All of the patient's questions and concerns were addressed.
Xelmarlenaz Pregnancy And Lactation Text: This medication is Pregnancy Category D and is not considered safe during pregnancy.  The risk during breast feeding is also uncertain.
Niacinamide Pregnancy And Lactation Text: These medications are considered safe during pregnancy.
Tazorac Counseling:  Patient advised that medication is irritating and drying.  Patient may need to apply sparingly and wash off after an hour before eventually leaving it on overnight.  The patient verbalized understanding of the proper use and possible adverse effects of tazorac.  All of the patient's questions and concerns were addressed.
Xeljanz Counseling: I discussed with the patient the risks of Xeljanz therapy including increased risk of infection, liver issues, headache, diarrhea, or cold symptoms. Live vaccines should be avoided. They were instructed to call if they have any problems.
Cimzia Pregnancy And Lactation Text: This medication crosses the placenta but can be considered safe in certain situations. Cimzia may be excreted in breast milk.
Methotrexate Pregnancy And Lactation Text: This medication is Pregnancy Category X and is known to cause fetal harm. This medication is excreted in breast milk.
Elidel Counseling: Patient may experience a mild burning sensation during topical application. Elidel is not approved in children less than 2 years of age. There have been case reports of hematologic and skin malignancies in patients using topical calcineurin inhibitors although causality is questionable.
Tetracycline Pregnancy And Lactation Text: This medication is Pregnancy Category D and not consider safe during pregnancy. It is also excreted in breast milk.
Siliq Counseling:  I discussed with the patient the risks of Siliq including but not limited to new or worsening depression, suicidal thoughts and behavior, immunosuppression, malignancy, posterior leukoencephalopathy syndrome, and serious infections.  The patient understands that monitoring is required including a PPD at baseline and must alert us or the primary physician if symptoms of infection or other concerning signs are noted. There is also a special program designed to monitor depression which is required with Siliq.
Cimetidine Counseling:  I discussed with the patient the risks of Cimetidine including but not limited to gynecomastia, headache, diarrhea, nausea, drowsiness, arrhythmias, pancreatitis, skin rashes, psychosis, bone marrow suppression and kidney toxicity.
Opzelura Pregnancy And Lactation Text: There is insufficient data to evaluate drug-associated risk for major birth defects, miscarriage, or other adverse maternal or fetal outcomes.  There is a pregnancy registry that monitors pregnancy outcomes in pregnant persons exposed to the medication during pregnancy.  It is unknown if this medication is excreted in breast milk.  Do not breastfeed during treatment and for about 4 weeks after the last dose.
Sski Pregnancy And Lactation Text: This medication is Pregnancy Category D and isn't considered safe during pregnancy. It is excreted in breast milk.
Doxycycline Counseling: Patient counseled regarding possible photosensitivity and increased risk for sunburn. Patient instructed to avoid sunlight, if possible. When exposed to sunlight, patients should wear protective clothing, sunglasses, and sunscreen. Counseled on GI AE / can cause nausea/vomiting/abdominal pain among other symptoms. The patient verbalized understanding of the proper use and possible adverse effects of doxycycline. To take with light meal to avoid any upset stomach (nausea vomiting or ab pain). Not to take concurrently with vitamins or dairy products (take at separate times). Take with full glass of water. Do not lay down for at least 1 hour after taking. All of the patient's questions and concerns were addressed. Female patients should avoid pregnancy while on therapy due to potential birth defects.
Birth Control Pills Counseling: Birth Control Pill Counseling: I discussed with the patient the potential side effects of OCPs including but not limited to increased risk of stroke, heart attack, thrombophlebitis, deep venous thrombosis, hepatic adenomas, breast changes, GI upset, headaches, and depression.  The patient verbalized understanding of the proper use and possible adverse effects of OCPs. All of the patient's questions and concerns were addressed.
Azelaic Acid Pregnancy And Lactation Text: This medication is considered safe during pregnancy and breast feeding.
Nsaids Counseling: NSAID Counseling: I discussed with the patient that NSAIDs should be taken with food. Prolonged use of NSAIDs can result in the development of stomach ulcers.  Patient advised to stop taking NSAIDs if abdominal pain occurs.  The patient verbalized understanding of the proper use and possible adverse effects of NSAIDs.  All of the patient's questions and concerns were addressed.
Tazorac Pregnancy And Lactation Text: This medication is not safe during pregnancy. It is unknown if this medication is excreted in breast milk.
Siliq Pregnancy And Lactation Text: The risk during pregnancy and breastfeeding is uncertain with this medication.
Clofazimine Pregnancy And Lactation Text: This medication is Pregnancy Category C and isn't considered safe during pregnancy. It is excreted in breast milk.
VTAMA Counseling: I discussed with the patient that VTAMA is not for use in the eyes, mouth or mouth. They should call the office if they develop any signs of allergic reactions to VTAMA. The patient verbalized understanding of the proper use and possible adverse effects of VTAMA.  All of the patient's questions and concerns were addressed.
Erivedge Counseling- I discussed with the patient the risks of Erivedge including but not limited to nausea, vomiting, diarrhea, constipation, weight loss, changes in the sense of taste, decreased appetite, muscle spasms, and hair loss.  The patient verbalized understanding of the proper use and possible adverse effects of Erivedge.  All of the patient's questions and concerns were addressed.
Doxycycline Pregnancy And Lactation Text: This medication is Pregnancy Category D and not consider safe during pregnancy. It is also excreted in breast milk but is considered safe for shorter treatment courses.
Prednisone Counseling:  I discussed with the patient the risks of prolonged use of prednisone including but not limited to weight gain, insomnia, osteoporosis, mood changes, diabetes, susceptibility to infection, glaucoma and high blood pressure.  In cases where prednisone use is prolonged, patients should be monitored with blood pressure checks, serum glucose levels and an eye exam.  Additionally, the patient may need to be placed on GI prophylaxis, PCP prophylaxis, and calcium and vitamin D supplementation and/or a bisphosphonate.  The patient verbalized understanding of the proper use and the possible adverse effects of prednisone.  All of the patient's questions and concerns were addressed.
Birth Control Pills Pregnancy And Lactation Text: This medication should be avoided if pregnant and for the first 30 days post-partum.
Hydroxyzine Pregnancy And Lactation Text: This medication is not safe during pregnancy and should not be taken. It is also excreted in breast milk and breast feeding isn't recommended.
Cosentyx Counseling:  I discussed with the patient the risks of Cosentyx including but not limited to worsening of Crohn's disease, immunosuppression, allergic reactions and infections.  The patient understands that monitoring is required including a PPD at baseline and must alert us or the primary physician if symptoms of infection or other concerning signs are noted.
Hydroquinone Pregnancy And Lactation Text: This medication has not been assigned a Pregnancy Risk Category but animal studies failed to show danger with the topical medication. It is unknown if the medication is excreted in breast milk.
Cimetidine Pregnancy And Lactation Text: This medication is Pregnancy Category B and is considered safe during pregnancy. It is also excreted in breast milk and breast feeding isn't recommended.
Picato Counseling:  I discussed with the patient the risks of Picato including but not limited to erythema, scaling, itching, weeping, crusting, and pain.
Colchicine Counseling:  Patient counseled regarding adverse effects including but not limited to stomach upset (nausea, vomiting, stomach pain, or diarrhea).  Patient instructed to limit alcohol consumption while taking this medication.  Colchicine may reduce blood counts especially with prolonged use.  The patient understands that monitoring of kidney function and blood counts may be required, especially at baseline. The patient verbalized understanding of the proper use and possible adverse effects of colchicine.  All of the patient's questions and concerns were addressed.
Imiquimod Counseling:  I discussed with the patient the risks of imiquimod including but not limited to erythema, scaling, itching, weeping, crusting, and pain.  Patient understands that the inflammatory response to imiquimod is variable from person to person and was educated regarded proper titration schedule.  If flu-like symptoms develop, patient knows to discontinue the medication and contact us.
Erythromycin Counseling:  I discussed with the patient the risks of erythromycin including but not limited to GI upset, allergic reaction, drug rash, diarrhea, increase in liver enzymes, and yeast infections.
Benzoyl Peroxide Counseling: Patient counseled that medicine may cause skin irritation and bleach clothing.  In the event of skin irritation, the patient was advised to reduce the amount of the drug applied or use it less frequently.   The patient verbalized understanding of the proper use and possible adverse effects of benzoyl peroxide.  All of the patient's questions and concerns were addressed.
Topical Clindamycin Counseling: Patient counseled that this medication may cause skin irritation or allergic reactions.  In the event of skin irritation, the patient was advised to reduce the amount of the drug applied or use it less frequently.   The patient verbalized understanding of the proper use and possible adverse effects of clindamycin.  All of the patient's questions and concerns were addressed.
Cosentyx Pregnancy And Lactation Text: This medication is Pregnancy Category B and is considered safe during pregnancy. It is unknown if this medication is excreted in breast milk.
Prednisone Pregnancy And Lactation Text: This medication is Pregnancy Category C and it isn't know if it is safe during pregnancy. This medication is excreted in breast milk.
Doxepin Counseling:  Patient advised that the medication is sedating and not to drive a car after taking this medication. Patient informed of potential adverse effects including but not limited to dry mouth, urinary retention, and blurry vision.  The patient verbalized understanding of the proper use and possible adverse effects of doxepin.  All of the patient's questions and concerns were addressed.
Eucrisa Counseling: Patient may experience a mild burning sensation during topical application. Eucrisa is not approved in children less than 3 months of age.
Simponi Counseling:  I discussed with the patient the risks of golimumab including but not limited to myelosuppression, immunosuppression, autoimmune hepatitis, demyelinating diseases, lymphoma, and serious infections.  The patient understands that monitoring is required including a PPD at baseline and must alert us or the primary physician if symptoms of infection or other concerning signs are noted.
Spironolactone Counseling: Patient advised regarding risks of diarrhea, abdominal pain, hyperkalemia, birth defects (for female patients), liver toxicity and renal toxicity. The patient may need blood work to monitor liver and kidney function and potassium levels while on therapy. The patient verbalized understanding of the proper use and possible adverse effects of spironolactone.  All of the patient's questions and concerns were addressed.
Protopic Counseling: Patient may experience a mild burning sensation during topical application. Protopic is not approved in children less than 2 years of age. There have been case reports of hematologic and skin malignancies in patients using topical calcineurin inhibitors although causality is questionable.
Spironolactone Pregnancy And Lactation Text: This medication can cause feminization of the male fetus and should be avoided during pregnancy. The active metabolite is also found in breast milk.
Olanzapine Counseling- I discussed with the patient the common side effects of olanzapine including but are not limited to: lack of energy, dry mouth, increased appetite, sleepiness, tremor, constipation, dizziness, changes in behavior, or restlessness.  Explained that teenagers are more likely to experience headaches, abdominal pain, pain in the arms or legs, tiredness, and sleepiness.  Serious side effects include but are not limited: increased risk of death in elderly patients who are confused, have memory loss, or dementia-related psychosis; hyperglycemia; increased cholesterol and triglycerides; and weight gain.
Libtayo Counseling- I discussed with the patient the risks of Libtayo including but not limited to nausea, vomiting, diarrhea, and bone or muscle pain.  The patient verbalized understanding of the proper use and possible adverse effects of Libtayo.  All of the patient's questions and concerns were addressed.
Acitretin Counseling:  I discussed with the patient the risks of acitretin including but not limited to hair loss, dry lips/skin/eyes, liver damage, hyperlipidemia, depression/suicidal ideation, photosensitivity.  Serious rare side effects can include but are not limited to pancreatitis, pseudotumor cerebri, bony changes, clot formation/stroke/heart attack.  Patient understands that alcohol is contraindicated since it can result in liver toxicity and significantly prolong the elimination of the drug by many years.
Fluconazole Counseling:  Patient counseled regarding adverse effects of fluconazole including but not limited to headache, diarrhea, nausea, upset stomach, liver function test abnormalities, taste disturbance, and stomach pain.  There is a rare possibility of liver failure that can occur when taking fluconazole.  The patient understands that monitoring of LFTs and kidney function test may be required, especially at baseline. The patient verbalized understanding of the proper use and possible adverse effects of fluconazole.  All of the patient's questions and concerns were addressed.
Vtama Pregnancy And Lactation Text: It is unknown if this medication can cause problems during pregnancy and breastfeeding.
Tranexamic Acid Counseling:  Patient advised of the small risk of bleeding problems with tranexamic acid. They were also instructed to call if they developed any nausea, vomiting or diarrhea. All of the patient's questions and concerns were addressed.
Erythromycin Pregnancy And Lactation Text: This medication is Pregnancy Category B and is considered safe during pregnancy. It is also excreted in breast milk.
Dupixent Counseling: I discussed with the patient the risks of dupilumab including but not limited to eye inflammation and irritation, cold sores, injection site reactions, allergic reactions and increased risk of parasitic infection. The patient understands that monitoring is required and they must alert us or the primary physician if symptoms of infection or other concerning signs are noted.
Nsaids Pregnancy And Lactation Text: These medications are considered safe up to 30 weeks gestation. It is excreted in breast milk.
Albendazole Counseling:  I discussed with the patient the risks of albendazole including but not limited to cytopenia, kidney damage, nausea/vomiting and severe allergy.  The patient understands that this medication is being used in an off-label manner.
Dupixent Pregnancy And Lactation Text: This medication likely crosses the placenta but the risk for the fetus is uncertain. This medication is excreted in breast milk.
Protopic Pregnancy And Lactation Text: This medication is Pregnancy Category C. It is unknown if this medication is excreted in breast milk when applied topically.
Carac Counseling:  I discussed with the patient the risks of Carac including but not limited to erythema, scaling, itching, weeping, crusting, and pain.
Fluconazole Pregnancy And Lactation Text: This medication is Pregnancy Category C and it isn't know if it is safe during pregnancy. It is also excreted in breast milk.
Topical Ketoconazole Counseling: Patient counseled that this medication may cause skin irritation or allergic reactions.  In the event of skin irritation, the patient was advised to reduce the amount of the drug applied or use it less frequently.   The patient verbalized understanding of the proper use and possible adverse effects of ketoconazole.  All of the patient's questions and concerns were addressed.
Metronidazole Counseling:  I discussed with the patient the risks of metronidazole including but not limited to seizures, nausea/vomiting, a metallic taste in the mouth, nausea/vomiting and severe allergy.
Dapsone Counseling: I discussed with the patient the risks of dapsone including but not limited to hemolytic anemia, agranulocytosis, rashes, methemoglobinemia, kidney failure, peripheral neuropathy, headaches, GI upset, and liver toxicity.  Patients who start dapsone require monitoring including baseline LFTs and weekly CBCs for the first month, then every month thereafter.  The patient verbalized understanding of the proper use and possible adverse effects of dapsone.  All of the patient's questions and concerns were addressed.
Benzoyl Peroxide Pregnancy And Lactation Text: This medication is Pregnancy Category C. It is unknown if benzoyl peroxide is excreted in breast milk.
Hydroquinone Counseling:  Patient advised that medication may result in skin irritation, lightening (hypopigmentation), dryness, and burning.  In the event of skin irritation, the patient was advised to reduce the amount of the drug applied or use it less frequently.  Rarely, spots that are treated with hydroquinone can become darker (pseudoochronosis).  Should this occur, patient instructed to stop medication and call the office. The patient verbalized understanding of the proper use and possible adverse effects of hydroquinone.  All of the patient's questions and concerns were addressed.
Topical Clindamycin Pregnancy And Lactation Text: This medication is Pregnancy Category B and is considered safe during pregnancy. It is unknown if it is excreted in breast milk.
Zoryve Counseling:  I discussed with the patient that Zoryve is not for use in the eyes, mouth or vagina. The most commonly reported side effects include diarrhea, headache, insomnia, application site pain, upper respiratory tract infections, and urinary tract infections.  All of the patient's questions and concerns were addressed.
Libtayo Pregnancy And Lactation Text: This medication is contraindicated in pregnancy and when breast feeding.
Skyrizi Counseling: I discussed with the patient the risks of risankizumab-rzaa including but not limited to immunosuppression, and serious infections.  The patient understands that monitoring is required including a PPD at baseline and must alert us or the primary physician if symptoms of infection or other concerning signs are noted.
Tranexamic Acid Pregnancy And Lactation Text: It is unknown if this medication is safe during pregnancy or breast feeding.
Odomzo Counseling- I discussed with the patient the risks of Odomzo including but not limited to nausea, vomiting, diarrhea, constipation, weight loss, changes in the sense of taste, decreased appetite, muscle spasms, and hair loss.  The patient verbalized understanding of the proper use and possible adverse effects of Odomzo.  All of the patient's questions and concerns were addressed.
Metronidazole Pregnancy And Lactation Text: This medication is Pregnancy Category B and considered safe during pregnancy.  It is also excreted in breast milk.
Dapsone Pregnancy And Lactation Text: This medication is Pregnancy Category C and is not considered safe during pregnancy or breast feeding.
Qbrexza Counseling:  I discussed with the patient the risks of Qbrexza including but not limited to headache, mydriasis, blurred vision, dry eyes, nasal dryness, dry mouth, dry throat, dry skin, urinary hesitation, and constipation.  Local skin reactions including erythema, burning, stinging, and itching can also occur.
Acitretin Pregnancy And Lactation Text: This medication is Pregnancy Category X and should not be given to women who are pregnant or may become pregnant in the future. This medication is excreted in breast milk.
Valtrex Counseling: I discussed with the patient the risks of valacyclovir including but not limited to kidney damage, nausea, vomiting and severe allergy.  The patient understands that if the infection seems to be worsening or is not improving, they are to call.
Griseofulvin Counseling:  I discussed with the patient the risks of griseofulvin including but not limited to photosensitivity, cytopenia, liver damage, nausea/vomiting and severe allergy.  The patient understands that this medication is best absorbed when taken with a fatty meal (e.g., ice cream or french fries).
Cibinqo Counseling: I discussed with the patient the risks of Cibinqo therapy including but not limited to common cold, nausea, headache, cold sores, increased blood CPK levels, dizziness, UTIs, fatigue, acne, and vomitting. Live vaccines should be avoided.  This medication has been linked to serious infections; higher rate of mortality; malignancy and lymphoproliferative disorders; major adverse cardiovascular events; thrombosis; thrombocytopenia and lymphopenia; lipid elevations; and retinal detachment.
Enbrel Counseling:  I discussed with the patient the risks of etanercept including but not limited to myelosuppression, immunosuppression, autoimmune hepatitis, demyelinating diseases, lymphoma, and infections.  The patient understands that monitoring is required including a PPD at baseline and must alert us or the primary physician if symptoms of infection or other concerning signs are noted.
Olanzapine Pregnancy And Lactation Text: This medication is pregnancy category C.   There are no adequate and well controlled trials with olanzapine in pregnant females.  Olanzapine should be used during pregnancy only if the potential benefit justifies the potential risk to the fetus.   In a study in lactating healthy women, olanzapine was excreted in breast milk.  It is recommended that women taking olanzapine should not breast feed.
Albendazole Pregnancy And Lactation Text: This medication is Pregnancy Category C and it isn't known if it is safe during pregnancy. It is also excreted in breast milk.
Qbrexza Pregnancy And Lactation Text: There is no available data on Qbrexza use in pregnant women.  There is no available data on Qbrexza use in lactation.
Ivermectin Counseling:  Patient instructed to take medication on an empty stomach with a full glass of water.  Patient informed of potential adverse effects including but not limited to nausea, diarrhea, dizziness, itching, and swelling of the extremities or lymph nodes.  The patient verbalized understanding of the proper use and possible adverse effects of ivermectin.  All of the patient's questions and concerns were addressed.
Stelara Counseling:  I discussed with the patient the risks of ustekinumab including but not limited to immunosuppression, malignancy, posterior leukoencephalopathy syndrome, and serious infections.  The patient understands that monitoring is required including a PPD at baseline and must alert us or the primary physician if symptoms of infection or other concerning signs are noted.
Griseofulvin Pregnancy And Lactation Text: This medication is Pregnancy Category X and is known to cause serious birth defects. It is unknown if this medication is excreted in breast milk but breast feeding should be avoided.
Minocycline Counseling: Patient advised regarding possible photosensitivity and discoloration of the teeth, skin, lips, tongue and gums.  Patient instructed to avoid sunlight, if possible.  When exposed to sunlight, patients should wear protective clothing, sunglasses, and sunscreen.  The patient was instructed to call the office immediately if the following severe adverse effects occur:  hearing changes, easy bruising/bleeding, severe headache, or vision changes.  The patient verbalized understanding of the proper use and possible adverse effects of minocycline.  All of the patient's questions and concerns were addressed.
Valtrex Pregnancy And Lactation Text: this medication is Pregnancy Category B and is considered safe during pregnancy. This medication is not directly found in breast milk but it's metabolite acyclovir is present.
Oral Minoxidil Counseling- I discussed with the patient the risks of oral minoxidil including but not limited to shortness of breath, swelling of the feet or ankles, dizziness, lightheadedness, unwanted hair growth and allergic reaction.  The patient verbalized understanding of the proper use and possible adverse effects of oral minoxidil.  All of the patient's questions and concerns were addressed.
Carac Pregnancy And Lactation Text: This medication is Pregnancy Category X and contraindicated in pregnancy and in women who may become pregnant. It is unknown if this medication is excreted in breast milk.
Azathioprine Counseling:  I discussed with the patient the risks of azathioprine including but not limited to myelosuppression, immunosuppression, hepatotoxicity, lymphoma, and infections.  The patient understands that monitoring is required including baseline LFTs, Creatinine, possible TPMP genotyping and weekly CBCs for the first month and then every 2 weeks thereafter.  The patient verbalized understanding of the proper use and possible adverse effects of azathioprine.  All of the patient's questions and concerns were addressed.
Zyclara Counseling:  I discussed with the patient the risks of imiquimod including but not limited to erythema, scaling, itching, weeping, crusting, and pain.  Patient understands that the inflammatory response to imiquimod is variable from person to person and was educated regarded proper titration schedule.  If flu-like symptoms develop, patient knows to discontinue the medication and contact us.
Bexarotene Counseling:  I discussed with the patient the risks of bexarotene including but not limited to hair loss, dry lips/skin/eyes, liver abnormalities, hyperlipidemia, pancreatitis, depression/suicidal ideation, photosensitivity, drug rash/allergic reactions, hypothyroidism, anemia, leukopenia, infection, cataracts, and teratogenicity.  Patient understands that they will need regular blood tests to check lipid profile, liver function tests, white blood cell count, thyroid function tests and pregnancy test if applicable.
Cibinqo Pregnancy And Lactation Text: It is unknown if this medication will adversely affect pregnancy or breast feeding.  You should not take this medication if you are currently pregnant or planning a pregnancy or while breastfeeding.
Itraconazole Counseling:  I discussed with the patient the risks of itraconazole including but not limited to liver damage, nausea/vomiting, neuropathy, and severe allergy.  The patient understands that this medication is best absorbed when taken with acidic beverages such as non-diet cola or ginger ale.  The patient understands that monitoring is required including baseline LFTs and repeat LFTs at intervals.  The patient understands that they are to contact us or the primary physician if concerning signs are noted.
Detail Level: Simple
Azathioprine Pregnancy And Lactation Text: This medication is Pregnancy Category D and isn't considered safe during pregnancy. It is unknown if this medication is excreted in breast milk.
Humira Counseling:  I discussed with the patient the risks of adalimumab including but not limited to myelosuppression, immunosuppression, autoimmune hepatitis, demyelinating diseases, lymphoma, and serious infections.  The patient understands that monitoring is required including a PPD at baseline and must alert us or the primary physician if symptoms of infection or other concerning signs are noted.
Rhofade Counseling: Rhofade is a topical medication which can decrease superficial blood flow where applied. Side effects are uncommon and include stinging, redness and allergic reactions.
Litfulo Counseling: I discussed with the patient the risks of Litfulo therapy including but not limited to upper respiratory tract infections, shingles, cold sores, and nausea. Live vaccines should be avoided.  This medication has been linked to serious infections; higher rate of mortality; malignancy and lymphoproliferative disorders; major adverse cardiovascular events; thrombosis; gastrointestinal perforations; neutropenia; lymphopenia; anemia; liver enzyme elevations; and lipid elevations.
Gabapentin Counseling: I discussed with the patient the risks of gabapentin including but not limited to dizziness, somnolence, fatigue and ataxia.
Bexarotene Pregnancy And Lactation Text: This medication is Pregnancy Category X and should not be given to women who are pregnant or may become pregnant. This medication should not be used if you are breast feeding.
Use Enhanced Medication Counseling?: Yes
Calcipotriene Counseling:  I discussed with the patient the risks of calcipotriene including but not limited to erythema, scaling, itching, and irritation.
Oral Minoxidil Pregnancy And Lactation Text: This medication should only be used when clearly needed if you are pregnant, attempting to become pregnant or breast feeding.
Azithromycin Counseling:  I discussed with the patient the risks of azithromycin including but not limited to GI upset, allergic reaction, drug rash, diarrhea, and yeast infections.
Topical Metronidazole Counseling: Metronidazole is a topical antibiotic medication. You may experience burning, stinging, redness, or allergic reactions.  Please call our office if you develop any problems from using this medication.
Klisyri Counseling:  I discussed with the patient the risks of Klisyri including but not limited to erythema, scaling, itching, weeping, crusting, and pain.
Taltz Counseling: I discussed with the patient the risks of ixekizumab including but not limited to immunosuppression, serious infections, worsening of inflammatory bowel disease and drug reactions.  The patient understands that monitoring is required including a PPD at baseline and must alert us or the primary physician if symptoms of infection or other concerning signs are noted.
Quinolones Counseling:  I discussed with the patient the risks of fluoroquinolones including but not limited to GI upset, allergic reaction, drug rash, diarrhea, dizziness, photosensitivity, yeast infections, liver function test abnormalities, tendonitis/tendon rupture.
Topical Metronidazole Pregnancy And Lactation Text: This medication is Pregnancy Category B and considered safe during pregnancy.  It is also considered safe to use while breastfeeding.
Otezla Counseling: The side effects of Otezla were discussed with the patient, including but not limited to worsening or new depression, weight loss, diarrhea, nausea, upper respiratory tract infection, and headache. Patient instructed to call the office should any adverse effect occur.  The patient verbalized understanding of the proper use and possible adverse effects of Otezla.  All the patient's questions and concerns were addressed.
Azithromycin Pregnancy And Lactation Text: This medication is considered safe during pregnancy and is also secreted in breast milk.
Litfulo Pregnancy And Lactation Text: Based on animal studies, Lifulo may cause embryo-fetal harm when administered to pregnant women.  The medication should not be used in pregnancy.  Breastfeeding is not recommended during treatment.
Cellcept Counseling:  I discussed with the patient the risks of mycophenolate mofetil including but not limited to infection/immunosuppression, GI upset, hypokalemia, hypercholesterolemia, bone marrow suppression, lymphoproliferative disorders, malignancy, GI ulceration/bleed/perforation, colitis, interstitial lung disease, kidney failure, progressive multifocal leukoencephalopathy, and birth defects.  The patient understands that monitoring is required including a baseline creatinine and regular CBC testing. In addition, patient must alert us immediately if symptoms of infection or other concerning signs are noted.
Calcipotriene Pregnancy And Lactation Text: The use of this medication during pregnancy or lactation is not recommended as there is insufficient data.
Isotretinoin Counseling: Patient should get monthly blood tests, not donate blood, not drive at night if vision affected, not share medication, and not undergo elective surgery for 6 months after tx completed. Side effects reviewed, pt to contact office should one occur.
Dutasteride Male Counseling: Dustasteride Counseling:  I discussed with the patient the risks of use of dutasteride including but not limited to decreased libido, decreased ejaculate volume, and gynecomastia. Women who can become pregnant should not handle medication.  All of the patient's questions and concerns were addressed.
Isotretinoin Pregnancy And Lactation Text: This medication is Pregnancy Category X and is considered extremely dangerous during pregnancy. It is unknown if it is excreted in breast milk.
Ketoconazole Counseling:   Patient counseled regarding improving absorption with orange juice.  Adverse effects include but are not limited to breast enlargement, headache, diarrhea, nausea, upset stomach, liver function test abnormalities, taste disturbance, and stomach pain.  There is a rare possibility of liver failure that can occur when taking ketoconazole. The patient understands that monitoring of LFTs may be required, especially at baseline. The patient verbalized understanding of the proper use and possible adverse effects of ketoconazole.  All of the patient's questions and concerns were addressed.
Olumiant Counseling: I discussed with the patient the risks of Olumiant therapy including but not limited to upper respiratory tract infections, shingles, cold sores, and nausea. Live vaccines should be avoided.  This medication has been linked to serious infections; higher rate of mortality; malignancy and lymphoproliferative disorders; major adverse cardiovascular events; thrombosis; gastrointestinal perforations; neutropenia; lymphopenia; anemia; liver enzyme elevations; and lipid elevations.
Ilumya Counseling: I discussed with the patient the risks of tildrakizumab including but not limited to immunosuppression, malignancy, posterior leukoencephalopathy syndrome, and serious infections.  The patient understands that monitoring is required including a PPD at baseline and must alert us or the primary physician if symptoms of infection or other concerning signs are noted.
Klisyri Pregnancy And Lactation Text: It is unknown if this medication can harm a developing fetus or if it is excreted in breast milk.
Otezla Pregnancy And Lactation Text: This medication is Pregnancy Category C and it isn't known if it is safe during pregnancy. It is unknown if it is excreted in breast milk.
Bactrim Counseling:  I discussed with the patient the risks of sulfa antibiotics including but not limited to GI upset, allergic reaction, drug rash, diarrhea, dizziness, photosensitivity, and yeast infections.  Rarely, more serious reactions can occur including but not limited to aplastic anemia, agranulocytosis, methemoglobinemia, blood dyscrasias, liver or kidney failure, lung infiltrates or desquamative/blistering drug rashes.
Rhofade Pregnancy And Lactation Text: This medication has not been assigned a Pregnancy Risk Category. It is unknown if the medication is excreted in breast milk.
Cantharidin Counseling:  I discussed with the patient the risks of Cantharidin including but not limited to pain, redness, burning, itching, and blistering.
Topical Steroids Counseling: I discussed with the patient that prolonged use of topical steroids can result in the increased appearance of superficial blood vessels (telangiectasias), lightening (hypopigmentation) and thinning of the skin (atrophy).  Patient understands to avoid using high potency steroids in skin folds, the groin or the face.  The patient verbalized understanding of the proper use and possible adverse effects of topical steroids.  All of the patient's questions and concerns were addressed.
Glycopyrrolate Counseling:  I discussed with the patient the risks of glycopyrrolate including but not limited to skin rash, drowsiness, dry mouth, difficulty urinating, and blurred vision.
Bactrim Pregnancy And Lactation Text: This medication is Pregnancy Category D and is known to cause fetal risk.  It is also excreted in breast milk.
Adbry Counseling: I discussed with the patient the risks of tralokinumab including but not limited to eye infection and irritation, cold sores, injection site reactions, worsening of asthma, allergic reactions and increased risk of parasitic infection.  Live vaccines should be avoided while taking tralokinumab. The patient understands that monitoring is required and they must alert us or the primary physician if symptoms of infection or other concerning signs are noted.
Dutasteride Female Counseling: Dutasteride Counseling:  I discussed with the patient the risks of use of dutasteride including but not limited to decreased libido and sexual dysfunction. Explained the teratogenic nature of the medication and stressed the importance of not getting pregnant during treatment. All of the patient's questions and concerns were addressed.
Minoxidil Counseling: Minoxidil is a topical medication which can increase blood flow where it is applied. It is uncertain how this medication increases hair growth. Side effects are uncommon and include stinging and allergic reactions.
Topical Steroids Applications Pregnancy And Lactation Text: Most topical steroids are considered safe to use during pregnancy and lactation.  Any topical steroid applied to the breast or nipple should be washed off before breastfeeding.
Opioid Counseling: I discussed with the patient the potential side effects of opioids including but not limited to addiction, altered mental status, and depression. I stressed avoiding alcohol, benzodiazepines, muscle relaxants and sleep aids unless specifically okayed by a physician. The patient verbalized understanding of the proper use and possible adverse effects of opioids. All of the patient's questions and concerns were addressed. They were instructed to flush the remaining pills down the toilet if they did not need them for pain.
Glycopyrrolate Pregnancy And Lactation Text: This medication is Pregnancy Category B and is considered safe during pregnancy. It is unknown if it is excreted breast milk.
Oxybutynin Counseling:  I discussed with the patient the risks of oxybutynin including but not limited to skin rash, drowsiness, dry mouth, difficulty urinating, and blurred vision.
Olumiant Pregnancy And Lactation Text: Based on animal studies, Olumiant may cause embryo-fetal harm when administered to pregnant women.  The medication should not be used in pregnancy.  Breastfeeding is not recommended during treatment.
Cyclophosphamide Counseling:  I discussed with the patient the risks of cyclophosphamide including but not limited to hair loss, hormonal abnormalities, decreased fertility, abdominal pain, diarrhea, nausea and vomiting, bone marrow suppression and infection. The patient understands that monitoring is required while taking this medication.
High Dose Vitamin A Counseling: Side effects reviewed, pt to contact office should one occur.
Solaraze Counseling:  I discussed with the patient the risks of Solaraze including but not limited to erythema, scaling, itching, weeping, crusting, and pain.
Cantharidin Pregnancy And Lactation Text: This medication has not been proven safe during pregnancy. It is unknown if this medication is excreted in breast milk.
Adbry Pregnancy And Lactation Text: It is unknown if this medication will adversely affect pregnancy or breast feeding.
Rinvoq Counseling: I discussed with the patient the risks of Rinvoq therapy including but not limited to upper respiratory tract infections, shingles, cold sores, bronchitis, nausea, cough, fever, acne, and headache. Live vaccines should be avoided.  This medication has been linked to serious infections; higher rate of mortality; malignancy and lymphoproliferative disorders; major adverse cardiovascular events; thrombosis; thrombocytopenia, anemia, and neutropenia; lipid elevations; liver enzyme elevations; and gastrointestinal perforations.
Cyclophosphamide Pregnancy And Lactation Text: This medication is Pregnancy Category D and it isn't considered safe during pregnancy. This medication is excreted in breast milk.
Terbinafine Counseling: Patient counseling regarding adverse effects of terbinafine including but not limited to headache, diarrhea, rash, upset stomach, liver function test abnormalities, itching, taste/smell disturbance, nausea, abdominal pain, and flatulence.  There is a rare possibility of liver failure that can occur when taking terbinafine.  The patient understands that a baseline LFT and kidney function test may be required. The patient verbalized understanding of the proper use and possible adverse effects of terbinafine.  All of the patient's questions and concerns were addressed.
5-Fu Counseling: 5-Fluorouracil Counseling:  I discussed with the patient the risks of 5-fluorouracil including but not limited to erythema, scaling, itching, weeping, crusting, and pain.
Rifampin Pregnancy And Lactation Text: This medication is Pregnancy Category C and it isn't know if it is safe during pregnancy. It is also excreted in breast milk and should not be used if you are breast feeding.
High Dose Vitamin A Pregnancy And Lactation Text: High dose vitamin A therapy is contraindicated during pregnancy and breast feeding.
Dutasteride Pregnancy And Lactation Text: This medication is absolutely contraindicated in women, especially during pregnancy and breast feeding. Feminization of male fetuses is possible if taking while pregnant.
Infliximab Counseling:  I discussed with the patient the risks of infliximab including but not limited to myelosuppression, immunosuppression, autoimmune hepatitis, demyelinating diseases, lymphoma, and serious infections.  The patient understands that monitoring is required including a PPD at baseline and must alert us or the primary physician if symptoms of infection or other concerning signs are noted.
Tremfya Counseling: I discussed with the patient the risks of guselkumab including but not limited to immunosuppression, serious infections, and drug reactions.  The patient understands that monitoring is required including a PPD at baseline and must alert us or the primary physician if symptoms of infection or other concerning signs are noted.
Solaraze Pregnancy And Lactation Text: This medication is Pregnancy Category B and is considered safe. There is some data to suggest avoiding during the third trimester. It is unknown if this medication is excreted in breast milk.
Ketoconazole Pregnancy And Lactation Text: This medication is Pregnancy Category C and it isn't know if it is safe during pregnancy. It is also excreted in breast milk and breast feeding isn't recommended.
Topical Sulfur Applications Counseling: Topical Sulfur Counseling: Patient counseled that this medication may cause skin irritation or allergic reactions.  In the event of skin irritation, the patient was advised to reduce the amount of the drug applied or use it less frequently.   The patient verbalized understanding of the proper use and possible adverse effects of topical sulfur application.  All of the patient's questions and concerns were addressed.
Opioid Pregnancy And Lactation Text: These medications can lead to premature delivery and should be avoided during pregnancy. These medications are also present in breast milk in small amounts.
Include Pregnancy/Lactation Warning?: No
Rifampin Counseling: I discussed with the patient the risks of rifampin including but not limited to liver damage, kidney damage, red-orange body fluids, nausea/vomiting and severe allergy.
Hydroxychloroquine Counseling:  I discussed with the patient that a baseline ophthalmologic exam is needed at the start of therapy and every year thereafter while on therapy. A CBC may also be warranted for monitoring.  The side effects of this medication were discussed with the patient, including but not limited to agranulocytosis, aplastic anemia, seizures, rashes, retinopathy, and liver toxicity. Patient instructed to call the office should any adverse effect occur.  The patient verbalized understanding of the proper use and possible adverse effects of Plaquenil.  All the patient's questions and concerns were addressed.
Propranolol Counseling:  I discussed with the patient the risks of propranolol including but not limited to low heart rate, low blood pressure, low blood sugar, restlessness and increased cold sensitivity. They should call the office if they experience any of these side effects.
Topical Sulfur Applications Pregnancy And Lactation Text: This medication is considered safe during pregnancy and breast feeding secondary to limited systemic absorption.
Cephalexin Pregnancy And Lactation Text: This medication is Pregnancy Category B and considered safe during pregnancy.  It is also excreted in breast milk but can be used safely for shorter doses.
Hydroxychloroquine Pregnancy And Lactation Text: This medication has been shown to cause fetal harm but it isn't assigned a Pregnancy Risk Category. There are small amounts excreted in breast milk.
Cyclosporine Counseling:  I discussed with the patient the risks of cyclosporine including but not limited to hypertension, gingival hyperplasia,myelosuppression, immunosuppression, liver damage, kidney damage, neurotoxicity, lymphoma, and serious infections. The patient understands that monitoring is required including baseline blood pressure, CBC, CMP, lipid panel and uric acid, and then 1-2 times monthly CMP and blood pressure.
Bimzelx Counseling:  I discussed with the patient the risks of Bimzelx including but not limited to depression, immunosuppression, allergic reactions and infections.  The patient understands that monitoring is required including a PPD at baseline and must alert us or the primary physician if symptoms of infection or other concerning signs are noted.
Finasteride Male Counseling: Finasteride Counseling:  I discussed with the patient the risks of use of finasteride including but not limited to decreased libido, decreased ejaculate volume, gynecomastia, and depression. Women should not handle medication.  All of the patient's questions and concerns were addressed.
Rinvoq Pregnancy And Lactation Text: Based on animal studies, Rinvoq may cause embryo-fetal harm when administered to pregnant women.  The medication should not be used in pregnancy.  Breastfeeding is not recommended during treatment and for 6 days after the last dose.
Cephalexin Counseling: I counseled the patient regarding use of cephalexin as an antibiotic for prophylactic and/or therapeutic purposes. Cephalexin (commonly prescribed under brand name Keflex) is a cephalosporin antibiotic which is active against numerous classes of bacteria, including most skin bacteria. Side effects may include nausea, diarrhea, gastrointestinal upset, rash, hives, yeast infections, and in rare cases, hepatitis, kidney disease, seizures, fever, confusion, neurologic symptoms, and others. Patients with severe allergies to penicillin medications are cautioned that there is about a 10% incidence of cross-reactivity with cephalosporins. When possible, patients with penicillin allergies should use alternatives to cephalosporins for antibiotic therapy.
Soolantra Counseling: I discussed with the patients the risks of topial Soolantra. This is a medicine which decreases the number of mites and inflammation in the skin. You experience burning, stinging, eye irritation or allergic reactions.  Please call our office if you develop any problems from using this medication.
Sotyktu Counseling:  I discussed the most common side effects of Sotyktu including: common cold, sore throat, sinus infections, cold sores, canker sores, folliculitis, and acne.  I also discussed more serious side effects of Sotyktu including but not limited to: serious allergic reactions; increased risk for infections such as TB; cancers such as lymphomas; rhabdomyolysis and elevated CPK; and elevated triglycerides and liver enzymes. 
Drysol Counseling:  I discussed with the patient the risks of drysol/aluminum chloride including but not limited to skin rash, itching, irritation, burning.
Soolantra Pregnancy And Lactation Text: This medication is Pregnancy Category C. This medication is considered safe during breast feeding.
Propranolol Pregnancy And Lactation Text: This medication is Pregnancy Category C and it isn't known if it is safe during pregnancy. It is excreted in breast milk.
Mirvaso Counseling: Mirvaso is a topical medication which can decrease superficial blood flow where applied. Side effects are uncommon and include stinging, redness and allergic reactions.
Low Dose Naltrexone Counseling- I discussed with the patient the potential risks and side effects of low dose naltrexone including but not limited to: more vivid dreams, headaches, nausea, vomiting, abdominal pain, fatigue, dizziness, and anxiety.
Wartpeel Counseling:  I discussed with the patient the risks of Wartpeel including but not limited to erythema, scaling, itching, weeping, crusting, and pain.
Xolair Counseling:  Patient informed of potential adverse effects including but not limited to fever, muscle aches, rash and allergic reactions.  The patient verbalized understanding of the proper use and possible adverse effects of Xolair.  All of the patient's questions and concerns were addressed.
Sarecycline Counseling: Patient advised regarding possible photosensitivity and discoloration of the teeth, skin, lips, tongue and gums.  Patient instructed to avoid sunlight, if possible.  When exposed to sunlight, patients should wear protective clothing, sunglasses, and sunscreen.  The patient was instructed to call the office immediately if the following severe adverse effects occur:  hearing changes, easy bruising/bleeding, severe headache, or vision changes.  The patient verbalized understanding of the proper use and possible adverse effects of sarecycline.  All of the patient's questions and concerns were addressed.
SSKI Counseling:  I discussed with the patient the risks of SSKI including but not limited to thyroid abnormalities, metallic taste, GI upset, fever, headache, acne, arthralgias, paraesthesias, lymphadenopathy, easy bleeding, arrhythmias, and allergic reaction.
Sotyktu Pregnancy And Lactation Text: There is insufficient data to evaluate whether or not Sotyktu is safe to use during pregnancy.   It is not known if Sotyktu passes into breast milk and whether or not it is safe to use when breastfeeding.  
Cimzia Counseling:  I discussed with the patient the risks of Cimzia including but not limited to immunosuppression, allergic reactions and infections.  The patient understands that monitoring is required including a PPD at baseline and must alert us or the primary physician if symptoms of infection or other concerning signs are noted.
Methotrexate Counseling:  Patient counseled regarding adverse effects of methotrexate including but not limited to nausea, vomiting, abnormalities in liver function tests. Patients may develop mouth sores, rash, diarrhea, and abnormalities in blood counts. The patient understands that monitoring is required including LFT's and blood counts.  There is a rare possibility of scarring of the liver and lung problems that can occur when taking methotrexate. Persistent nausea, loss of appetite, pale stools, dark urine, cough, and shortness of breath should be reported immediately. Patient advised to discontinue methotrexate treatment at least three months before attempting to become pregnant.  I discussed the need for folate supplements while taking methotrexate.  These supplements can decrease side effects during methotrexate treatment. The patient verbalized understanding of the proper use and possible adverse effects of methotrexate.  All of the patient's questions and concerns were addressed.
Low Dose Naltrexone Pregnancy And Lactation Text: Naltrexone is pregnancy category C.  There have been no adequate and well-controlled studies in pregnant women.  It should be used in pregnancy only if the potential benefit justifies the potential risk to the fetus.   Limited data indicates that naltrexone is minimally excreted into breastmilk.
Arava Counseling:  Patient counseled regarding adverse effects of Arava including but not limited to nausea, vomiting, abnormalities in liver function tests. Patients may develop mouth sores, rash, diarrhea, and abnormalities in blood counts. The patient understands that monitoring is required including LFTs and blood counts.  There is a rare possibility of scarring of the liver and lung problems that can occur when taking methotrexate. Persistent nausea, loss of appetite, pale stools, dark urine, cough, and shortness of breath should be reported immediately. Patient advised to discontinue Arava treatment and consult with a physician prior to attempting conception. The patient will have to undergo a treatment to eliminate Arava from the body prior to conception.
Finasteride Female Counseling: Finasteride Counseling:  I discussed with the patient the risks of use of finasteride including but not limited to decreased libido and sexual dysfunction. Explained the teratogenic nature of the medication and stressed the importance of not getting pregnant during treatment. All of the patient's questions and concerns were addressed.
Bimzelx Pregnancy And Lactation Text: This medication crosses the placenta and the safety is uncertain during pregnancy. It is unknown if this medication is present in breast milk.
Aklief counseling:  Patient advised to apply a pea-sized amount only at bedtime and wait 30 minutes after washing their face before applying.  If too drying, patient may add a non-comedogenic moisturizer.  The most commonly reported side effects including irritation, redness, scaling, dryness, stinging, burning, itching, and increased risk of sunburn.  The patient verbalized understanding of the proper use and possible adverse effects of retinoids.  All of the patient's questions and concerns were addressed.
Clindamycin Counseling: I counseled the patient regarding use of clindamycin as an antibiotic for prophylactic and/or therapeutic purposes. Clindamycin is active against numerous classes of bacteria, including skin bacteria. Side effects may include nausea, diarrhea, gastrointestinal upset, rash, hives, yeast infections, and in rare cases, colitis.
Xolair Pregnancy And Lactation Text: This medication is Pregnancy Category B and is considered safe during pregnancy. This medication is excreted in breast milk.
Topical Retinoid counseling:  Patient advised to apply a pea-sized amount only at bedtime and wait 30 minutes after washing their face before applying.  If too drying, patient may add a non-comedogenic moisturizer. The patient verbalized understanding of the proper use and possible adverse effects of retinoids.  All of the patient's questions and concerns were addressed.
Rituxan Counseling:  I discussed with the patient the risks of Rituxan infusions. Side effects can include infusion reactions, severe drug rashes including mucocutaneous reactions, reactivation of latent hepatitis and other infections and rarely progressive multifocal leukoencephalopathy.  All of the patient's questions and concerns were addressed.

## 2024-04-23 DIAGNOSIS — E87.0 DM (DIABETES MELLITUS), TYPE 1, UNCONTROLLED, WITH HYPEROSMOLARITY (HCC): Primary | ICD-10-CM

## 2024-04-23 DIAGNOSIS — E10.69 DM (DIABETES MELLITUS), TYPE 1, UNCONTROLLED, WITH HYPEROSMOLARITY (HCC): Primary | ICD-10-CM

## 2024-04-23 DIAGNOSIS — E10.65 DM (DIABETES MELLITUS), TYPE 1, UNCONTROLLED, WITH HYPEROSMOLARITY (HCC): Primary | ICD-10-CM

## 2024-04-24 ENCOUNTER — TELEPHONE (OUTPATIENT)
Dept: ENDOCRINOLOGY CLINIC | Facility: CLINIC | Age: 54
End: 2024-04-24

## 2024-04-24 DIAGNOSIS — E87.0 DM (DIABETES MELLITUS), TYPE 1, UNCONTROLLED, WITH HYPEROSMOLARITY (HCC): Primary | ICD-10-CM

## 2024-04-24 DIAGNOSIS — E10.69 DM (DIABETES MELLITUS), TYPE 1, UNCONTROLLED, WITH HYPEROSMOLARITY (HCC): Primary | ICD-10-CM

## 2024-04-24 DIAGNOSIS — E10.65 DM (DIABETES MELLITUS), TYPE 1, UNCONTROLLED, WITH HYPEROSMOLARITY (HCC): Primary | ICD-10-CM

## 2024-04-24 RX ORDER — INSULIN GLARGINE-YFGN 100 [IU]/ML
56 INJECTION, SOLUTION SUBCUTANEOUS DAILY
Qty: 60 ML | Refills: 1 | Status: CANCELLED | OUTPATIENT
Start: 2024-04-24

## 2024-04-24 RX ORDER — INSULIN LISPRO 100 [IU]/ML
20 INJECTION, SOLUTION INTRAVENOUS; SUBCUTANEOUS 3 TIMES DAILY
Qty: 45 ML | Refills: 1 | Status: SHIPPED | OUTPATIENT
Start: 2024-04-24

## 2024-04-24 NOTE — TELEPHONE ENCOUNTER
Endocrine refill protocol for basal insulins     Protocol Criteria: PASSED  - Appointment with Endocrinology completed in the last 6 months or scheduled in the next 3 months    - A1c result completed in the last 6 months and is <8.5%     Verify appointment has been completed or scheduled in the appropriate timeline. If so can send a 90 day supply with 1 refill per provider protocol.    Verify A1c has been completed within the last 6 months and is below 8.5%     Last completed office visit: 10/2323  Next scheduled Follow up: 6/24/24  Last A1c result: Last A1c value was 8.3% done 10/23/2023.

## 2024-04-24 NOTE — TELEPHONE ENCOUNTER
Patient states insurance is no longer covering Semglee and Humalog - was told by pharmacist prescriptions need prior auth or a new prescription for generic.  Patient states he is ok with generic prescription if physician agrees to it.  Patient requesting 90 days refills for insulins.  Please call.  Thank you.

## 2024-04-24 NOTE — TELEPHONE ENCOUNTER
Endocrine refill protocol for rapid acting, regular, intermediate, and mixed insulin:      Protocol Criteria: PASSED  Appointment with Endocrinology completed in the last 6 months or scheduled in the next 3 months     Verify appointment has been completed or scheduled in the appropriate timeline. If so can send a 90 day supply with 1 refill.   Verify A1C has been completed in the last 6 months and is <8.5%    -May substitute prescriptions for Novolog and Humalog unless documented allergy (pens and vials) at the same dose and concentration per insurance preference and provider protocol.   -May substitute prescriptions for Novolin R and Humulin R unless documented allergy (pens and vials) at the same dose and concentration per insurance preference and provider protocol.   -May substitute prescriptions for Novolin N and Humulin N unless documented allergy (pens and vials) at the same dose and concentration per insurance preference and provider protocol.   -May substitute prescriptions for Humulin and Novolin 70/30 insulin unless documented allergy at the same dose and concentration per insurance preference and provider protocol.        Last completed office visit: 10/23/23  Next scheduled Follow up:   Future Appointments   Date Time Provider Department Center   6/24/2024  2:15 PM Taylor Gurrola MD [facility]     Last A1C result: 8.3% done 10/23/2023.

## 2024-04-25 RX ORDER — INSULIN LISPRO 100 [IU]/ML
INJECTION, SOLUTION INTRAVENOUS; SUBCUTANEOUS
Qty: 45 ML | Refills: 1 | Status: SHIPPED | OUTPATIENT
Start: 2024-04-25 | End: 2024-04-26

## 2024-04-25 RX ORDER — INSULIN GLARGINE-YFGN 100 [IU]/ML
32 INJECTION, SOLUTION SUBCUTANEOUS 2 TIMES DAILY
Qty: 60 ML | Refills: 1 | Status: SHIPPED | OUTPATIENT
Start: 2024-04-25

## 2024-04-25 NOTE — TELEPHONE ENCOUNTER
Endocrine refill protocol for rapid acting, regular, intermediate, and mixed insulin:      Protocol Criteria:  Appointment with Endocrinology completed in the last 6 months or scheduled in the next 3 months     Verify appointment has been completed or scheduled in the appropriate timeline. If so can send a 90 day supply with 1 refill.   Verify A1C has been completed in the last 6 months and is <8.5%    -May substitute prescriptions for Novolog and Humalog unless documented allergy (pens and vials) at the same dose and concentration per insurance preference and provider protocol.   -May substitute prescriptions for Novolin R and Humulin R unless documented allergy (pens and vials) at the same dose and concentration per insurance preference and provider protocol.   -May substitute prescriptions for Novolin N and Humulin N unless documented allergy (pens and vials) at the same dose and concentration per insurance preference and provider protocol.   -May substitute prescriptions for Humulin and Novolin 70/30 insulin unless documented allergy at the same dose and concentration per insurance preference and provider protocol.        Last completed office visit: 10/23/23  Next scheduled Follow up:   Future Appointments   Date Time Provider Department Center   6/24/2024  2:15 PM Taylor Gurrola MD [facility]     Last A1C result: 8.3% done 10/23/2023.

## 2024-04-26 ENCOUNTER — TELEPHONE (OUTPATIENT)
Dept: ENDOCRINOLOGY CLINIC | Facility: CLINIC | Age: 54
End: 2024-04-26

## 2024-04-26 DIAGNOSIS — E10.65 DM (DIABETES MELLITUS), TYPE 1, UNCONTROLLED, WITH HYPEROSMOLARITY (HCC): ICD-10-CM

## 2024-04-26 DIAGNOSIS — E87.0 DM (DIABETES MELLITUS), TYPE 1, UNCONTROLLED, WITH HYPEROSMOLARITY (HCC): ICD-10-CM

## 2024-04-26 DIAGNOSIS — E10.69 DM (DIABETES MELLITUS), TYPE 1, UNCONTROLLED, WITH HYPEROSMOLARITY (HCC): ICD-10-CM

## 2024-04-26 RX ORDER — INSULIN LISPRO 100 [IU]/ML
INJECTION, SOLUTION INTRAVENOUS; SUBCUTANEOUS
Qty: 15 ML | Refills: 0 | Status: SHIPPED | OUTPATIENT
Start: 2024-04-26

## 2024-06-07 ENCOUNTER — HOSPITAL ENCOUNTER (OUTPATIENT)
Age: 54
Discharge: HOME | End: 2024-06-07
Payer: COMMERCIAL

## 2024-06-07 ENCOUNTER — APPOINTMENT (OUTPATIENT)
Dept: GENERAL RADIOLOGY | Age: 54
End: 2024-06-07
Attending: NURSE PRACTITIONER
Payer: COMMERCIAL

## 2024-06-07 VITALS
RESPIRATION RATE: 18 BRPM | HEART RATE: 99 BPM | TEMPERATURE: 99 F | OXYGEN SATURATION: 97 % | DIASTOLIC BLOOD PRESSURE: 79 MMHG | SYSTOLIC BLOOD PRESSURE: 134 MMHG

## 2024-06-07 DIAGNOSIS — B97.89 VIRAL RESPIRATORY ILLNESS: Primary | ICD-10-CM

## 2024-06-07 DIAGNOSIS — J98.8 VIRAL RESPIRATORY ILLNESS: Primary | ICD-10-CM

## 2024-06-07 LAB
POCT INFLUENZA A: NEGATIVE
POCT INFLUENZA B: NEGATIVE
SARS-COV-2 RNA RESP QL NAA+PROBE: NOT DETECTED

## 2024-06-07 PROCEDURE — U0002 COVID-19 LAB TEST NON-CDC: HCPCS | Performed by: NURSE PRACTITIONER

## 2024-06-07 PROCEDURE — 94640 AIRWAY INHALATION TREATMENT: CPT | Performed by: NURSE PRACTITIONER

## 2024-06-07 PROCEDURE — 87502 INFLUENZA DNA AMP PROBE: CPT | Performed by: NURSE PRACTITIONER

## 2024-06-07 PROCEDURE — 71046 X-RAY EXAM CHEST 2 VIEWS: CPT | Performed by: NURSE PRACTITIONER

## 2024-06-07 PROCEDURE — 99214 OFFICE O/P EST MOD 30 MIN: CPT | Performed by: NURSE PRACTITIONER

## 2024-06-07 RX ORDER — IPRATROPIUM BROMIDE AND ALBUTEROL SULFATE 2.5; .5 MG/3ML; MG/3ML
3 SOLUTION RESPIRATORY (INHALATION) ONCE
Status: COMPLETED | OUTPATIENT
Start: 2024-06-07 | End: 2024-06-07

## 2024-06-07 RX ORDER — ALBUTEROL SULFATE 90 UG/1
2 AEROSOL, METERED RESPIRATORY (INHALATION) EVERY 4 HOURS PRN
Qty: 1 EACH | Refills: 0 | Status: SHIPPED | OUTPATIENT
Start: 2024-06-07 | End: 2024-07-07

## 2024-06-07 RX ORDER — BENZONATATE 100 MG/1
100 CAPSULE ORAL 3 TIMES DAILY PRN
Qty: 20 CAPSULE | Refills: 0 | Status: SHIPPED | OUTPATIENT
Start: 2024-06-07 | End: 2024-06-14

## 2024-06-07 NOTE — ED PROVIDER NOTES
Patient Seen in: [facility]    History   CC: cough  HPI: Shoaib Guido 54 year old male w/ DMI, Hyperlipidemia, HTN who presents c/o runny nose, cough and congestion for the last 3 days however states it is now settling in his chest and he is concerned for secondary bacterial infection.  History of pneumonia.  Denies chest pain or hemoptysis.  Denies fever, rash, leg swelling.  Denies GI signs/symptoms.    Past Medical History:    Other and unspecified hyperlipidemia    Pneumonia    Type I (juvenile type) diabetes mellitus without mention of complication, not stated as uncontrolled    Unspecified essential hypertension       No past surgical history on file.    Family History   Problem Relation Age of Onset    Heart Disease Mother        Social History     Socioeconomic History    Marital status:    Tobacco Use    Smoking status: Never    Smokeless tobacco: Never   Substance and Sexual Activity    Alcohol use: Yes     Comment: occ    Drug use: Never   Other Topics Concern    Caffeine Concern Yes     Comment: 2cups    History of tanning No    Reaction to local anesthetic No       ROS:  Review of Systems    Positive for stated complaint: Cold  Other systems are as noted in HPI.  Constitutional and vital signs reviewed.      All other systems reviewed and negative except as noted above.    PSFH elements reviewed from today and agreed except as otherwise stated in HPI.             Constitutional and vital signs reviewed.        Physical Exam     ED Triage Vitals [06/07/24 1049]   /83   Pulse 106   Resp 18   Temp 97.1 °F (36.2 °C)   Temp src Temporal   SpO2 96 %   O2 Device None (Room air)       Current:/79   Pulse 99   Temp 98.6 °F (37 °C) (Oral)   Resp 18   SpO2 97%         PE:  General - Appears well, non-toxic and in NAD  Head - Appears symmetrical without deformity/swelling cranium, scalp, or facial bones  Eyes - sclera not injected, no discharge noted, no periorbital  edema  ENT - EAC bilaterally without discharge, TM pearly grey with COL visualized appropriately bilaterally.   no nasal drainage noted in nares bilat, no cobblestoning to post. Pharynx.   Oropharynx clear, posterior pharynx is erythematous without tonsilar enlargement or exudate, uvula midline, +gag, voice is clear. No trismus  Neck - no significant adenopathy, supple with trachea midline  Resp -rhonchi noted to bilateral bases mildly however wob unlabored, good aeration with equal, even expansion bilaterally   CV - RRR  Skin - no rashes or petechiae noted, pink warm and dry throughout, mmm, cap refill <2seconds  Neuro - A&O x4, steady gait  MSK - makes purposeful movements of all extremities, radial pulses 2+ bilat.  Psych - Interactive and appropriate      ED Course     Labs Reviewed   POCT FLU TEST - Normal    Narrative:     This assay is a rapid molecular in vitro test utilizing nucleic acid amplification of influenza A and B viral RNA.   RAPID SARS-COV-2 BY PCR - Normal       [facility]     XR CHEST PA + LAT CHEST (CPT=71046) (Final result)  Result time 06/07/24 11:28:01  Final result by Paras Mullins MD (06/07/24 11:28:01)                Impression:    CONCLUSION:  1. No acute disease in the chest.           Dictated by (CST): Paras Mullins MD on 6/07/2024 at 11:27 AM      Finalized by (CST): Paras Mullins MD on 6/07/2024 at 11:27 AM                  Narrative:    PROCEDURE: XR CHEST PA + LAT CHEST (CPT=71046)     COMPARISON: [facility], XR CHEST PA + LAT CHEST (PSF=70990), 4/19/2022, 7:24 PM.     INDICATIONS: Productive cough and congestion x3 days. History of hypertension and diabetes.     TECHNIQUE:   Two views.       FINDINGS:  CARDIAC/VASC: Normal.  No cardiac silhouette abnormality or cardiomegaly.  Unremarkable pulmonary vasculature.    MEDIAST/MATT: No visible mass or adenopathy.  LUNGS/PLEURA: Normal.  No significant pulmonary parenchymal abnormalities.  No effusion or pleural thickening.     BONES: No fracture or visible bony lesion.  Mild thoracic spine spondylosis.  OTHER: Negative.                   DDx: Pneumonia, influenza, COVID-19, unspecified viral illness, bronchospasm    Chest x-ray without acute process and independently reviewed by this provider.  Influenza and rapid COVID PCR negative.  Results discussed with patient was well as general viral illness and presumed bronchospasm instructions reviewed.  Offered DuoNeb which patient is agreeable to.  States he has had inhalers in the past with some relief.     Reexamined after DuoNeb with significant improvement in lung sounds.  Clear to auscultation in all fields bilaterally.  Patient reports improvement in his breathing associated.  Continued inhaler use as well as cough suppressant prescribed reviewed.  Rest, hydration instructions, follow-up and return/ED precautions reviewed.  Patient is historian and demonstrates understanding of all instruction and agrees with plan of care.      Disposition and Plan     Clinical Impression:  1. Viral respiratory illness        Disposition:  Discharge    Follow-up:  Chava Topete MD  [address] [place] [address]  [phone]    Go in 3 days  As needed      Medications Prescribed:  Discharge Medication List as of 6/7/2024 12:04 PM        START taking these medications    Details   !! benzonatate 100 MG Oral Cap Take 1 capsule (100 mg total) by mouth 3 (three) times daily as needed for cough., Normal, Disp-20 capsule, R-0      albuterol 108 (90 Base) MCG/ACT Inhalation Aero Soln Inhale 2 puffs into the lungs every 4 (four) hours as needed for Wheezing or Shortness of Breath (spastic cough)., Normal, Disp-1 each, R-0       !! - Potential duplicate medications found. Please discuss with provider.

## 2024-06-17 ENCOUNTER — APPOINTMENT (OUTPATIENT)
Dept: URBAN - METROPOLITAN AREA CLINIC 244 | Age: 54
Setting detail: DERMATOLOGY
End: 2024-06-17

## 2024-06-17 DIAGNOSIS — L66.1 LICHEN PLANOPILARIS: ICD-10-CM

## 2024-06-17 PROBLEM — L30.9 DERMATITIS, UNSPECIFIED: Status: ACTIVE | Noted: 2024-06-17

## 2024-06-17 PROCEDURE — 99214 OFFICE O/P EST MOD 30 MIN: CPT

## 2024-06-17 PROCEDURE — OTHER PRESCRIPTION MEDICATION MANAGEMENT: OTHER

## 2024-06-17 PROCEDURE — OTHER COUNSELING: OTHER

## 2024-06-17 PROCEDURE — OTHER PRESCRIPTION: OTHER

## 2024-06-17 RX ORDER — FLUOCINONIDE 0.5 MG/ML
SOLUTION TOPICAL
Qty: 60 | Refills: 1 | Status: ERX | COMMUNITY
Start: 2024-06-17

## 2024-06-17 ASSESSMENT — LOCATION DETAILED DESCRIPTION DERM: LOCATION DETAILED: POSTERIOR MID-PARIETAL SCALP

## 2024-06-17 ASSESSMENT — LOCATION SIMPLE DESCRIPTION DERM: LOCATION SIMPLE: POSTERIOR SCALP

## 2024-06-17 ASSESSMENT — LOCATION ZONE DERM: LOCATION ZONE: SCALP

## 2024-06-17 NOTE — PROCEDURE: PRESCRIPTION MEDICATION MANAGEMENT
Initiate Treatment: Fluocinonide sol BID for two weeks then stop and use for flares not to be used continuously
Detail Level: Zone
Render In Strict Bullet Format?: No
Discontinue Regimen: doxycycline - helped a lot.
Continue Regimen: Clindamycin Sol BID, CLN QD.

## 2024-06-24 ENCOUNTER — OFFICE VISIT (OUTPATIENT)
Dept: ENDOCRINOLOGY CLINIC | Facility: CLINIC | Age: 54
End: 2024-06-24

## 2024-06-24 VITALS
HEART RATE: 96 BPM | WEIGHT: 240 LBS | SYSTOLIC BLOOD PRESSURE: 137 MMHG | DIASTOLIC BLOOD PRESSURE: 80 MMHG | BODY MASS INDEX: 33 KG/M2

## 2024-06-24 DIAGNOSIS — E10.69 DM (DIABETES MELLITUS), TYPE 1, UNCONTROLLED, WITH HYPEROSMOLARITY (HCC): Primary | ICD-10-CM

## 2024-06-24 DIAGNOSIS — E10.65 DM (DIABETES MELLITUS), TYPE 1, UNCONTROLLED, WITH HYPEROSMOLARITY (HCC): Primary | ICD-10-CM

## 2024-06-24 DIAGNOSIS — E87.0 DM (DIABETES MELLITUS), TYPE 1, UNCONTROLLED, WITH HYPEROSMOLARITY (HCC): Primary | ICD-10-CM

## 2024-06-24 LAB
GLUCOSE BLOOD: 105
HEMOGLOBIN A1C: 9.4 % (ref 4.3–5.6)
TEST STRIP LOT #: NORMAL NUMERIC

## 2024-06-24 RX ORDER — METOPROLOL SUCCINATE 100 MG/1
100 TABLET, EXTENDED RELEASE ORAL DAILY
Qty: 90 TABLET | Refills: 0 | Status: SHIPPED | OUTPATIENT
Start: 2024-06-24

## 2024-06-24 RX ORDER — PROCHLORPERAZINE 25 MG/1
1 SUPPOSITORY RECTAL CONTINUOUS
Qty: 1 EACH | Refills: 3 | Status: SHIPPED | OUTPATIENT
Start: 2024-06-24

## 2024-06-24 RX ORDER — INSULIN LISPRO 100 [IU]/ML
20 INJECTION, SOLUTION INTRAVENOUS; SUBCUTANEOUS 3 TIMES DAILY
Qty: 45 ML | Refills: 1 | Status: SHIPPED | OUTPATIENT
Start: 2024-06-24

## 2024-06-24 RX ORDER — IRBESARTAN AND HYDROCHLOROTHIAZIDE 150; 12.5 MG/1; MG/1
2 TABLET, FILM COATED ORAL DAILY
Qty: 180 TABLET | Refills: 1 | Status: SHIPPED | OUTPATIENT
Start: 2024-06-24

## 2024-06-24 RX ORDER — INSULIN GLARGINE-YFGN 100 [IU]/ML
32 INJECTION, SOLUTION SUBCUTANEOUS 2 TIMES DAILY
Qty: 60 ML | Refills: 1 | Status: SHIPPED | OUTPATIENT
Start: 2024-06-24

## 2024-06-24 RX ORDER — PROCHLORPERAZINE 25 MG/1
1 SUPPOSITORY RECTAL
Qty: 9 EACH | Refills: 1 | Status: SHIPPED | OUTPATIENT
Start: 2024-06-24

## 2024-06-24 RX ORDER — AMLODIPINE BESYLATE 10 MG/1
10 TABLET ORAL DAILY
Qty: 90 TABLET | Refills: 1 | Status: SHIPPED | OUTPATIENT
Start: 2024-06-24

## 2024-06-24 RX ORDER — PEN NEEDLE, DIABETIC 31 GX5/16"
NEEDLE, DISPOSABLE MISCELLANEOUS
Qty: 100 EACH | Refills: 3 | Status: SHIPPED | OUTPATIENT
Start: 2024-06-24

## 2024-06-24 NOTE — PROGRESS NOTES
Name: Shoaib Guido  Date: 6/24/2024    [id number] OF PRESENT ILLNESS   Shoaib Guido is a 54 year old male who presents for diabetes mellitus.      He removed pump at the end of August/September secondary to severe tape allergy and difficulty with omnipod.        Prior HbA, C or glycohemoglobin were 9.3% 3/2014; 8.3% 10/2014; 8.9% 6/2015; 9.1% 1/2016; 10.2% 8/2016; 10.1% 4/2017; 10.0% 1/2018; 11.7% 6/2018; 9.8% 8/2018; 11.5% 2/2019; 10.4% 10/2019; 10.5% 2/2021; 10.1% 9/2021; 10.3% 3/2022; 9.0% 7/2022; 12.5% 3/2023; 8.5% 6/2023; 8.3% 10/2023; 9.4% POC Today     Dietary compliance: Good - he just started AllFreed   Exercise: Yes, PT 2-3 times per week and gym in the evening   Polyuria/polydipsia: No  Blurred vision: No    Episodes of hypoglycemia: Rarely - nocturnal    Blood Glucose:  Checking 4 times per day  Reviewed CGM    Medications for DM  Semglee 32 units subcutaneous BID  Humalog 20 units subcutaneous TID with meals plus CF     Omnipod Pump discontinued due to tape allergy       REVIEW OF SYSTEMS  Eyes: Diabetic retinopathy present: No            Most recent visit to eye doctor in last 12 months: scheduled for 11/2023      CV: Cardiovascular disease present: No         Hypertension present: Yes         Hyperlipidemia present: Yes         Peripheral Vascular Disease present: Yes, CVA 1/2022 with some persistent numbness     : Nephropathy present: No    Neuro: Neuropathy present: Yes    Skin: Infection or ulceration: No    Osteoporosis: No    Thyroid disease: No      Medications:     Current Outpatient Medications:     albuterol 108 (90 Base) MCG/ACT Inhalation Aero Soln, Inhale 2 puffs into the lungs every 4 (four) hours as needed for Wheezing or Shortness of Breath (spastic cough)., Disp: 1 each, Rfl: 0    Insulin Lispro, 1 Unit Dial, 100 UNIT/ML Subcutaneous Solution Pen-injector, Inject 20 Units into the skin in the morning and 20 Units at noon and 20 Units in the bedtime., Disp: 15 mL, Rfl:  0    Insulin Glargine-yfgn 100 UNIT/ML Subcutaneous Solution Pen-injector, Inject 32 Units into the skin 2 (two) times daily., Disp: 60 mL, Rfl: 1    Insulin Lispro, 1 Unit Dial, (HUMALOG KWIKPEN) 100 UNIT/ML Subcutaneous Solution Pen-injector, Inject 20 Units into the skin in the morning, at noon, and at bedtime., Disp: 45 mL, Rfl: 1    amLODIPine 10 MG Oral Tab, Take 1 tablet (10 mg total) by mouth daily., Disp: 90 tablet, Rfl: 1    Insulin Pen Needle (BD PEN NEEDLE SHORT U/F) 31G X 8 MM Does not apply Misc, Inject 4 times daily, Disp: 100 each, Rfl: 3    Continuous Blood Gluc Sensor (DEXCOM G6 SENSOR) Does not apply Misc, 1 Device continuous., Disp: 3 each, Rfl: 11    metoprolol succinate  MG Oral Tablet 24 Hr, Take 1 tablet (100 mg total) by mouth daily., Disp: 90 tablet, Rfl: 0    triamcinolone 0.5 % External Cream, Apply to affected area twice daily, Disp: 15 g, Rfl: 1    Continuous Blood Gluc Sensor (DEXCOM G6 SENSOR) Does not apply Misc, 1 each Every 10 days., Disp: 9 each, Rfl: 3    Continuous Blood Gluc Transmit (DEXCOM G6 TRANSMITTER) Does not apply Misc, 1 Device continuous., Disp: 1 each, Rfl: 3    Irbesartan-hydroCHLOROthiazide 150-12.5 MG Oral Tab, Take 2 tablets by mouth daily., Disp: 180 tablet, Rfl: 1    Continuous Blood Gluc Transmit (DEXCOM G6 TRANSMITTER) Does not apply Misc, 1 each every 3 (three) months., Disp: 1 each, Rfl: 3    loratadine 10 MG Oral Tab, Take 1 tablet (10 mg total) by mouth daily., Disp: 30 tablet, Rfl: 0    aspirin (ASPIRIN 81) 81 MG Oral Tab EC, Take 1 tablet (81 mg total) by mouth daily., Disp: 90 tablet, Rfl: 3    Continuous Blood Gluc  (DEXCOM G6 ) Does not apply Device, 1 Device by Does not apply route continuous., Disp: 1 Device, Rfl: 0    Insulin Glargine-yfgn (SEMGLEE, YFGN,) 100 UNIT/ML Subcutaneous Solution Pen-injector, Inject 56 Units into the skin daily. (Patient not taking: Reported on 6/24/2024), Disp: 60 mL, Rfl: 1    Insulin Disposable  Pump (OMNIPOD 5 G6 POD, GEN 5,) Does not apply Misc, 1 each every other day. Change pod every 48 hours (Patient not taking: Reported on 6/24/2024), Disp: 9 each, Rfl: 0    Insulin Disposable Pump (OMNIPOD 5 G6 INTRO, GEN 5,) Does not apply Kit, 1 kit As Directed. Use for insulin pump (Patient not taking: Reported on 6/24/2024), Disp: 1 kit, Rfl: 0    benzonatate 100 MG Oral Cap, Take 1 capsule (100 mg total) by mouth 3 (three) times daily as needed for cough., Disp: 12 capsule, Rfl: 0     Allergies:   Allergies   Allergen Reactions    Bees RASH       Social History:   Social History     Socioeconomic History    Marital status:    Tobacco Use    Smoking status: Never    Smokeless tobacco: Never   Substance and Sexual Activity    Alcohol use: Yes     Comment: occ    Drug use: Never   Other Topics Concern    Caffeine Concern Yes     Comment: 2cups    History of tanning No    Reaction to local anesthetic No       Medical History:   Past Medical History:    Other and unspecified hyperlipidemia    Pneumonia    Type I (juvenile type) diabetes mellitus without mention of complication, not stated as uncontrolled    Unspecified essential hypertension       Surgical history:   No past surgical history on file.      PHYSICAL EXAM  /80   Pulse 96   Wt 240 lb (108.9 kg)   BMI 32.55 kg/m²     General Appearance:  alert, well developed, in no acute distress  Eyes:  normal conjunctivae, sclera., normal sclera and normal pupils  Throat/Neck: normal sound to voice.   Back: no kyphosis  Respiratory:  non-labored. no increased work of breathing.    Lymph Nodes:  No abnormal nodes noted  Skin:  normal moisture and skin texture  Hematologic:  no excessive bruising  Psychiatric:  oriented to time, self, and place  Bilateral barefoot skin diabetic exam is normal, visualized feet and the appearance is normal.  Bilateral monofilament/sensation of both feet is normal.  Pulsation pedal pulse exam of both lower legs/feet is  normal as well.    ASSESSMENT/PLAN:      1. Diabetes Mellitus Type 1, Uncontrolled  -Uncontrolled, HgA1c 9.5% -->increased  -He admits to not watching diet  -Discussed at length the importance of regular follow up and importance of decreasing BG levels  -Discussed importance of glycemic control to prevent complications of diabetes  -Discussed complications of diabetes include retinopathy, neuropathy, nephropathy and cardiovascular disease  -Discussed importance of SBGM  -Discussed importance of CHO counting  -Discussed importance of restarting dexcom   -Discussed that he is starting to develop complications of DM1 including CKD and CVA   -Discussed importance of glycemic control at length   -Insulin pump d/c'd due to tape allergy  -Continue Semglee 32 units subcutaneous BID  -Increase Humalog 20-22-28 units subcutaneous TID with meals  -Continue  CF 1:20>140   -Continue Dexcom CGM   -Normotensive- treated 4 medications  -Refer to nephrology given persistent hypertension and CKD stage II   -Continue hydrochlorothiazide to 25mg PO daily   -Continue Irbesartan, amlodipine   -Normal lipids  -Recheck yearly labs   -Normal foot exam performed today  -Refer to [facility] to work on diet      DM quality measures:  A1C/Blood pressure: as reported above   Nephropathy screenin2023 continue ace /arb rx.   LIPID screenin2023 . on statin rx.   Last dilated eye exam: scheduled for 2023    Last diabetic foot exam: No data recorded  Dentist : recommend every 6m    Reviewed CGM during OV and adjusted insulin as noted above.     RTC 4 months     2024  Taylor Gurrola MD

## 2024-09-28 RX ORDER — AMLODIPINE BESYLATE 10 MG/1
10 TABLET ORAL DAILY
Qty: 90 TABLET | Refills: 1 | Status: SHIPPED | OUTPATIENT
Start: 2024-09-28

## 2024-09-28 RX ORDER — PEN NEEDLE, DIABETIC 31 GX5/16"
NEEDLE, DISPOSABLE MISCELLANEOUS
Qty: 100 EACH | Refills: 3 | Status: SHIPPED | OUTPATIENT
Start: 2024-09-28

## 2024-09-28 RX ORDER — METOPROLOL SUCCINATE 100 MG/1
100 TABLET, EXTENDED RELEASE ORAL DAILY
Qty: 90 TABLET | Refills: 0 | Status: SHIPPED | OUTPATIENT
Start: 2024-09-28

## 2024-09-28 RX ORDER — IRBESARTAN AND HYDROCHLOROTHIAZIDE 150; 12.5 MG/1; MG/1
2 TABLET, FILM COATED ORAL DAILY
Qty: 180 TABLET | Refills: 1 | Status: SHIPPED | OUTPATIENT
Start: 2024-09-28

## 2024-09-28 NOTE — TELEPHONE ENCOUNTER
Endocrine Refill protocol for oral antihypertensive medications    Protocol Criteria:  FAILED  Reason: No labs completed in required time frame     If all below requirements are met, send a 90-day supply with 1 refill per provider protocol.    Verify appointment with Endocrinology completed in the last 6 months or scheduled in the next 3 months.  Verify BMP or CMP completed in the last 12 months   Verify last GFR result is greater than or equal to 50     Last completed office visit:6/24/2024 Taylor Gurrola MD   Next scheduled Follow up:   Future Appointments   Date Time Provider Department Center   10/31/2024  1:30 PM Taylor Gurrola MD [facility]      Last BMP or CMP completion date:  Lab Results   Component Value Date    GFRAA 67 03/14/2022    GFRNAA 58 (L) 03/14/2022    EGFRCR 56 (L) 06/26/2023       PocketMobile message sent to patient to have labs drawn.

## 2024-10-18 DIAGNOSIS — E10.69 DM (DIABETES MELLITUS), TYPE 1, UNCONTROLLED, WITH HYPEROSMOLARITY (HCC): ICD-10-CM

## 2024-10-18 DIAGNOSIS — E87.0 DM (DIABETES MELLITUS), TYPE 1, UNCONTROLLED, WITH HYPEROSMOLARITY (HCC): ICD-10-CM

## 2024-10-18 DIAGNOSIS — E10.65 DM (DIABETES MELLITUS), TYPE 1, UNCONTROLLED, WITH HYPEROSMOLARITY (HCC): ICD-10-CM

## 2024-10-18 RX ORDER — INSULIN GLARGINE-YFGN 100 [IU]/ML
32 INJECTION, SOLUTION SUBCUTANEOUS 2 TIMES DAILY
Qty: 60 ML | Refills: 1 | Status: SHIPPED | OUTPATIENT
Start: 2024-10-18

## 2024-10-18 NOTE — TELEPHONE ENCOUNTER
Endocrine refill protocol for basal insulins     Protocol Criteria: FAILED Reason: Elevated A1C    If all below requirements are met, send a 90-day supply with 1 refill per provider protocol.       Verify Appointment with Endocrinology completed in the last 6 months or scheduled in the next 3 months.  Verify A1C has been completed within the last 6 months and is below 8.5%     Last completed office visit:6/24/2024 Taylor Gurrola MD   Next scheduled Follow up:   Future Appointments   Date Time Provider Department Center   10/31/2024  1:30 PM Taylor Gurrola MD [facility]      Last A1c result: Last A1c value was 9.4% done 6/24/2024.

## 2024-10-28 DIAGNOSIS — E10.69 DM (DIABETES MELLITUS), TYPE 1, UNCONTROLLED, WITH HYPEROSMOLARITY (HCC): ICD-10-CM

## 2024-10-28 DIAGNOSIS — E87.0 DM (DIABETES MELLITUS), TYPE 1, UNCONTROLLED, WITH HYPEROSMOLARITY (HCC): ICD-10-CM

## 2024-10-28 DIAGNOSIS — E10.65 DM (DIABETES MELLITUS), TYPE 1, UNCONTROLLED, WITH HYPEROSMOLARITY (HCC): ICD-10-CM

## 2024-10-28 RX ORDER — INSULIN LISPRO 100 [IU]/ML
INJECTION, SOLUTION INTRAVENOUS; SUBCUTANEOUS
Qty: 45 ML | Refills: 1 | Status: SHIPPED | OUTPATIENT
Start: 2024-10-28

## 2024-10-28 RX ORDER — INSULIN LISPRO 100 [IU]/ML
20 INJECTION, SOLUTION INTRAVENOUS; SUBCUTANEOUS 3 TIMES DAILY
Qty: 45 ML | Refills: 1 | Status: SHIPPED | OUTPATIENT
Start: 2024-10-28

## 2024-10-28 NOTE — TELEPHONE ENCOUNTER
Endocrine refill protocol for rapid acting, regular, intermediate, and mixed insulin:    Protocol Criteria:  FAILED Reason: Elevated A1C    If all below requirements are met, send a 90-day supply with 1 refill per provider protocol.    Verify appointment with Endocrinology completed in the last 6 months or scheduled in the next 3 months.  Verify A1C has been completed within the last 6 months and is below 8.5%    -May substitute prescriptions for Novolog and Humalog unless documented allergy (pens and vials) at the same dose and concentration per insurance preference and provider protocol.   -May substitute prescriptions for Novolin R and Humulin R unless documented allergy (pens and vials) at the same dose and concentration per insurance preference and provider protocol.   -May substitute prescriptions for Novolin N and Humulin N unless documented allergy (pens and vials) at the same dose and concentration per insurance preference and provider protocol.   -May substitute prescriptions for Humulin and Novolin 70/30 insulin unless documented allergy at the same dose and concentration per insurance preference and provider protocol.    Last completed office visit: 6/24/2024 Taylor Gurrola MD   Next scheduled Follow up:   Future Appointments   Date Time Provider Department Center   2/13/2025  6:15 PM Taylor Gurrola MD [facility]      Last A1C result: 9.4% done 6/24/2024.

## 2024-11-03 ENCOUNTER — OFFICE VISIT (OUTPATIENT)
Dept: FAMILY MEDICINE CLINIC | Facility: CLINIC | Age: 54
End: 2024-11-03
Payer: COMMERCIAL

## 2024-11-03 VITALS
WEIGHT: 235 LBS | SYSTOLIC BLOOD PRESSURE: 128 MMHG | BODY MASS INDEX: 32 KG/M2 | HEART RATE: 105 BPM | TEMPERATURE: 99 F | RESPIRATION RATE: 18 BRPM | DIASTOLIC BLOOD PRESSURE: 74 MMHG | OXYGEN SATURATION: 96 %

## 2024-11-03 DIAGNOSIS — N18.31 STAGE 3A CHRONIC KIDNEY DISEASE (HCC): ICD-10-CM

## 2024-11-03 DIAGNOSIS — U07.1 COVID: Primary | ICD-10-CM

## 2024-11-03 DIAGNOSIS — U07.1 POSITIVE SELF-ADMINISTERED ANTIGEN TEST FOR COVID-19: ICD-10-CM

## 2024-11-03 LAB — AMB EXT COVID-19 RESULT: DETECTED

## 2024-11-03 PROCEDURE — 99214 OFFICE O/P EST MOD 30 MIN: CPT | Performed by: PHYSICIAN ASSISTANT

## 2024-11-03 PROCEDURE — 3078F DIAST BP <80 MM HG: CPT | Performed by: PHYSICIAN ASSISTANT

## 2024-11-03 PROCEDURE — 3074F SYST BP LT 130 MM HG: CPT | Performed by: PHYSICIAN ASSISTANT

## 2024-11-03 NOTE — PATIENT INSTRUCTIONS
Take amlodipine every other day for next 10 days  HOLD atorvastatin for next 10 days.     Rest as much as possible   Drink lots of fluids-- aim to drink at least 60 ounces of water in addition to electrolyte replacement drinks.   You may take Ibuprofen 600 mg every 6 hours with a little food and you may add Acetaminophen 650 mg every 4 hours as needed for fever control and body aches.   Throat lozenges or hard candies to soothe sore throat and lessen cough   1 tsp of honey as needed to help coat throat and suppress cough   Warm salt water gargles to sooth sore throat   You can use your preferred OTC cold/cough medication just be sure to check the ingredients so you are not taking too much acetaminophen or ibuprofen.         When you may have a respiratory virus...?  Stay home and away from others (including people you live with who are not sick) if you have respiratory virus symptoms that aren't better explained by another cause. These symptoms can include fever, chills, fatigue, cough, runny nose, and headache, among others.    You can go back to your normal activities when, for at least 24 hours, both are true:  Your symptoms are getting better overall, and  2. You have not had a fever (and are not using fever-reducing medication).    When you go back to your normal activities, take added precaution over the next 5 days, such as taking additional steps for  air, hygiene, masks, physical distancing, and/or testing when you will be around other people indoors.    Keep in mind that you may still be able to spread the virus that made you sick, even if you are feeling better. You are likely to be less contagious at this time, depending on factors like how long you were sick or how sick you were.    If you develop a fever or you start to feel worse after you have gone back to normal activities, stay home and away from others again until, for at least 24 hours, both are true: your symptoms are improving overall,  and you have not had a fever (and are not using fever-reducing medication). Then take added precaution for the next 5 days.

## 2024-11-03 NOTE — PROGRESS NOTES
CHIEF COMPLAINT:     Chief Complaint   Patient presents with    Covid     Wife was travelling last week and tested positive for Covid yesterday  Sx yesterday - Dry cough, scratchy throat, body aches, low fever, chest tightness, sinus pressure, ear pressure  Denies SOB, nausea, vomiting, diarrhea, headache, chest congestion  Positive COVID this AM  No OTC       HPI:   Shoaib Guido is a 54 year old male who presents with symptoms as described below since yesterday, 11/2/24.       Fever:     Yes [x]     No []       Cough:    Yes [x]     No []       Dry [x]     Productive []   Congestion: Yes [x]     No []            Fatigue:   Yes [x]     No []     Myalgias: Yes [x]     No []   Chills:       Yes [x]    No []       Headache:     Yes []     No [x]       Sore throat:   Yes [x]      No []      Ear Pain:  Yes []      No [x]        Shortness of breath:  Yes []   No [x]     Wheezing:   Yes []   No [x]     Chest pain/pressure:     Yes []     No [x]        GI symptoms: Yes []     No [x]                     Nausea  []; Vomiting  []; Diarrhea  [] ; Upset stomach [];   Abdominal Pain  []              Symptoms have been mild since onset.    Treating symptoms with nothing yet.      Patient reports covid exposure from wife, tested positive yesterday.  Previous history of COVID: yes.    History of previous COVID testing: home test positive this morning       Other factors/medical conditions that may impact condition: age DM1      Current Outpatient Medications   Medication Sig Dispense Refill    INSULIN LISPRO, 1 UNIT DIAL, 100 UNIT/ML Subcutaneous Solution Pen-injector INJECT 20 UNITS INTO THE SKIN IN THE MORNING AND 20 UNITS AT NOON AND 20 UNITS IN THE BEDTIME. 45 mL 1    Insulin Lispro, 1 Unit Dial, (HUMALOG KWIKPEN) 100 UNIT/ML Subcutaneous Solution Pen-injector Inject 20 Units into the skin in the morning, at noon, and at bedtime. 45 mL 1    Insulin Glargine-yfgn 100 UNIT/ML Subcutaneous Solution Pen-injector Inject 32  Units into the skin 2 (two) times daily. 60 mL 1    Insulin Pen Needle (BD PEN NEEDLE SHORT U/F) 31G X 8 MM Does not apply Misc Inject 4 times daily 100 each 3    amLODIPine 10 MG Oral Tab Take 1 tablet (10 mg total) by mouth daily. 90 tablet 1    metoprolol succinate  MG Oral Tablet 24 Hr Take 1 tablet (100 mg total) by mouth daily. 90 tablet 0    Irbesartan-hydroCHLOROthiazide 150-12.5 MG Oral Tab Take 2 tablets by mouth daily. 180 tablet 1    Continuous Glucose Transmitter (DEXCOM G6 TRANSMITTER) Does not apply Misc 1 Device continuous. 1 each 3    Continuous Glucose Sensor (DEXCOM G6 SENSOR) Does not apply Misc 1 each Every 10 days. 9 each 1    Insulin Lispro, 1 Unit Dial, 100 UNIT/ML Subcutaneous Solution Pen-injector Inject 20 Units into the skin in the morning and 20 Units at noon and 20 Units in the bedtime. 15 mL 0    Insulin Glargine-yfgn (SEMGLEE, YFGN,) 100 UNIT/ML Subcutaneous Solution Pen-injector Inject 56 Units into the skin daily. 60 mL 1    Insulin Disposable Pump (OMNIPOD 5 G6 POD, GEN 5,) Does not apply Misc 1 each every other day. Change pod every 48 hours 9 each 0    triamcinolone 0.5 % External Cream Apply to affected area twice daily 15 g 1    Continuous Blood Gluc Sensor (DEXCOM G6 SENSOR) Does not apply Misc 1 each Every 10 days. 9 each 3    Continuous Blood Gluc Transmit (DEXCOM G6 TRANSMITTER) Does not apply Misc 1 each every 3 (three) months. 1 each 3    Insulin Disposable Pump (OMNIPOD 5 G6 INTRO, GEN 5,) Does not apply Kit 1 kit As Directed. Use for insulin pump 1 kit 0    loratadine 10 MG Oral Tab Take 1 tablet (10 mg total) by mouth daily. 30 tablet 0    aspirin (ASPIRIN 81) 81 MG Oral Tab EC Take 1 tablet (81 mg total) by mouth daily. 90 tablet 3    Continuous Blood Gluc  (DEXCOM G6 ) Does not apply Device 1 Device by Does not apply route continuous. 1 Device 0      Past Medical History:    Other and unspecified hyperlipidemia    Pneumonia    Type I (juvenile  type) diabetes mellitus without mention of complication, not stated as uncontrolled    Unspecified essential hypertension      No past surgical history on file.      Social History     Socioeconomic History    Marital status:    Tobacco Use    Smoking status: Never    Smokeless tobacco: Never   Substance and Sexual Activity    Alcohol use: Yes     Comment: occ    Drug use: Never   Other Topics Concern    Caffeine Concern Yes     Comment: 2cups    History of tanning No    Reaction to local anesthetic No         REVIEW OF SYSTEMS:   GENERAL: lower appetite, drinking fluids ok  SKIN: Denies rash or other lesions  HEENT: See HPI  LUNGS: See HPI  GI: see HPI      EXAM:   /74   Pulse 105   Temp 99.2 °F (37.3 °C) (Tympanic)   Resp 18   Wt 235 lb (106.6 kg)   SpO2 96%   BMI 31.87 kg/m²   GENERAL: appears well, well nourished, in no apparent distress  SKIN: no rashes,no suspicious lesions  HEAD: atraumatic, normocephalic.    EYES: conjunctiva clear  EARS: TM's pearly, no  bulging, no retraction,no fluid, bony landmarks visible  NOSE: Nostrils patent, thin, clear nasal discharge, nasal mucosa mildly inflamed   THROAT: Oral mucosa pink, moist. Posterior pharynx is non erythematous. No exudates.  Uvula midline   NECK: Supple, non-tender  LUNGS: clear to auscultation bilaterally, no wheezes or rhonchi. Breathing is non labored. Speaking in full sentences without hesitation. + dry cough  CARDIO: Mildly tachycardic, regular rhythm   EXTREMITIES: no cyanosis, clubbing or edema  LYMPH:  No cervical lymphadenopathy.    NEURO: No focal deficits     Recent Results (from the past 24 hours)   EE EXTERNAL COVID-19 INFECTION    Collection Time: 11/03/24 12:00 AM   Result Value Ref Range    COVID19 Detected (A) Not Detected       ASSESSMENT AND PLAN:   Shoaib Guido is a 54 year old male who presents with upper respiratory symptoms that are consistent with    ASSESSMENT:   Encounter Diagnoses   Name Primary?     COVID Yes    Positive self-administered antigen test for COVID-19        PLAN:    The patient has been deemed a candidate for Paxlovid.  Symptoms began yesterday.  Patient does not require hospitalization.   Patient has the following risks factors DM1, age.    Labs reveal show GFR of 58.   Per [facility] drug interaction : atorvastatin and amlodipine    Requested Prescriptions     Signed Prescriptions Disp Refills    nirmatrelvir-ritonavir 150-100 MG Oral Tablet Therapy Pack 20 tablet 0     Sig: Take one nirmatrelvir tablet (150mg) with one ritonavir tablet (100mg) together twice daily for 5 days.   (Nirmatrelvir dose is renally adjusted)       Discussed use, dose, and possible side effects.    The patient agrees to treatment with Paxlovid.   Advised to continue to self-isolate and use infection control measures according to CDC guidelines.    Patient voiced understanding    Comfort care as described in Patient Instructions.   Increase fluids and rest.     All questions were addressed and answered.

## 2024-11-10 ENCOUNTER — APPOINTMENT (OUTPATIENT)
Dept: GENERAL RADIOLOGY | Facility: HOSPITAL | Age: 54
End: 2024-11-10
Attending: EMERGENCY MEDICINE
Payer: COMMERCIAL

## 2024-11-10 ENCOUNTER — HOSPITAL ENCOUNTER (EMERGENCY)
Facility: HOSPITAL | Age: 54
Discharge: HOME | End: 2024-11-10
Attending: EMERGENCY MEDICINE
Payer: COMMERCIAL

## 2024-11-10 ENCOUNTER — OFFICE VISIT (OUTPATIENT)
Dept: FAMILY MEDICINE CLINIC | Facility: CLINIC | Age: 54
End: 2024-11-10
Payer: COMMERCIAL

## 2024-11-10 VITALS
HEART RATE: 103 BPM | BODY MASS INDEX: 31.83 KG/M2 | RESPIRATION RATE: 12 BRPM | WEIGHT: 235 LBS | SYSTOLIC BLOOD PRESSURE: 139 MMHG | DIASTOLIC BLOOD PRESSURE: 89 MMHG | HEIGHT: 72 IN | TEMPERATURE: 99 F | OXYGEN SATURATION: 95 %

## 2024-11-10 VITALS
DIASTOLIC BLOOD PRESSURE: 90 MMHG | TEMPERATURE: 99 F | HEIGHT: 72 IN | BODY MASS INDEX: 31.83 KG/M2 | SYSTOLIC BLOOD PRESSURE: 136 MMHG | RESPIRATION RATE: 18 BRPM | HEART RATE: 121 BPM | WEIGHT: 235 LBS | OXYGEN SATURATION: 97 %

## 2024-11-10 DIAGNOSIS — R06.02 SHORTNESS OF BREATH: Primary | ICD-10-CM

## 2024-11-10 DIAGNOSIS — U07.1 COVID-19: ICD-10-CM

## 2024-11-10 DIAGNOSIS — R00.0 TACHYCARDIA: ICD-10-CM

## 2024-11-10 DIAGNOSIS — J98.01 BRONCHOSPASM: Primary | ICD-10-CM

## 2024-11-10 LAB
ANION GAP SERPL CALC-SCNC: 8 MMOL/L (ref 0–18)
BASOPHILS # BLD AUTO: 0.03 X10(3) UL (ref 0–0.2)
BASOPHILS NFR BLD AUTO: 0.5 %
BUN BLD-MCNC: 15 MG/DL (ref 9–23)
BUN/CREAT SERPL: 10.3 (ref 10–20)
CALCIUM BLD-MCNC: 9.9 MG/DL (ref 8.7–10.4)
CHLORIDE SERPL-SCNC: 104 MMOL/L (ref 98–112)
CHOLEST SERPL-MCNC: 232 MG/DL (ref ?–200)
CO2 SERPL-SCNC: 24 MMOL/L (ref 21–32)
CREAT BLD-MCNC: 1.45 MG/DL
D DIMER PPP FEU-MCNC: 0.32 UG/ML FEU (ref ?–0.53)
DEPRECATED RDW RBC AUTO: 37.2 FL (ref 35.1–46.3)
EGFRCR SERPLBLD CKD-EPI 2021: 58 ML/MIN/1.73M2 (ref 60–?)
EOSINOPHIL # BLD AUTO: 0.67 X10(3) UL (ref 0–0.7)
EOSINOPHIL NFR BLD AUTO: 10.4 %
ERYTHROCYTE [DISTWIDTH] IN BLOOD BY AUTOMATED COUNT: 11.9 % (ref 11–15)
GLUCOSE BLD-MCNC: 222 MG/DL (ref 70–99)
GLUCOSE BLDC GLUCOMTR-MCNC: 236 MG/DL (ref 70–99)
HCT VFR BLD AUTO: 43.3 %
HDLC SERPL-MCNC: 36 MG/DL (ref 40–59)
HGB BLD-MCNC: 15.1 G/DL
IMM GRANULOCYTES # BLD AUTO: 0.03 X10(3) UL (ref 0–1)
IMM GRANULOCYTES NFR BLD: 0.5 %
LDLC SERPL CALC-MCNC: 135 MG/DL (ref ?–100)
LYMPHOCYTES # BLD AUTO: 1.21 X10(3) UL (ref 1–4)
LYMPHOCYTES NFR BLD AUTO: 18.8 %
MAGNESIUM SERPL-MCNC: 1.9 MG/DL (ref 1.6–2.6)
MCH RBC QN AUTO: 29.7 PG (ref 26–34)
MCHC RBC AUTO-ENTMCNC: 34.9 G/DL (ref 31–37)
MCV RBC AUTO: 85.1 FL
MONOCYTES # BLD AUTO: 0.59 X10(3) UL (ref 0.1–1)
MONOCYTES NFR BLD AUTO: 9.2 %
NEUTROPHILS # BLD AUTO: 3.91 X10 (3) UL (ref 1.5–7.7)
NEUTROPHILS # BLD AUTO: 3.91 X10(3) UL (ref 1.5–7.7)
NEUTROPHILS NFR BLD AUTO: 60.6 %
NONHDLC SERPL-MCNC: 196 MG/DL (ref ?–130)
OSMOLALITY SERPL CALC.SUM OF ELEC: 290 MOSM/KG (ref 275–295)
PLATELET # BLD AUTO: 291 10(3)UL (ref 150–450)
POTASSIUM SERPL-SCNC: 4.2 MMOL/L (ref 3.5–5.1)
RBC # BLD AUTO: 5.09 X10(6)UL
SODIUM SERPL-SCNC: 136 MMOL/L (ref 136–145)
TRIGL SERPL-MCNC: 337 MG/DL (ref 30–149)
TROPONIN I SERPL HS-MCNC: 80 NG/L
TROPONIN I SERPL HS-MCNC: 84 NG/L
TSI SER-ACNC: 3.33 UIU/ML (ref 0.55–4.78)
VLDLC SERPL CALC-MCNC: 63 MG/DL (ref 0–30)
WBC # BLD AUTO: 6.4 X10(3) UL (ref 4–11)

## 2024-11-10 PROCEDURE — 96360 HYDRATION IV INFUSION INIT: CPT

## 2024-11-10 PROCEDURE — 80061 LIPID PANEL: CPT | Performed by: EMERGENCY MEDICINE

## 2024-11-10 PROCEDURE — 84443 ASSAY THYROID STIM HORMONE: CPT | Performed by: EMERGENCY MEDICINE

## 2024-11-10 PROCEDURE — 85379 FIBRIN DEGRADATION QUANT: CPT | Performed by: EMERGENCY MEDICINE

## 2024-11-10 PROCEDURE — 93010 ELECTROCARDIOGRAM REPORT: CPT

## 2024-11-10 PROCEDURE — 99285 EMERGENCY DEPT VISIT HI MDM: CPT

## 2024-11-10 PROCEDURE — 94640 AIRWAY INHALATION TREATMENT: CPT

## 2024-11-10 PROCEDURE — 80048 BASIC METABOLIC PNL TOTAL CA: CPT | Performed by: EMERGENCY MEDICINE

## 2024-11-10 PROCEDURE — 85025 COMPLETE CBC W/AUTO DIFF WBC: CPT | Performed by: EMERGENCY MEDICINE

## 2024-11-10 PROCEDURE — 83735 ASSAY OF MAGNESIUM: CPT | Performed by: EMERGENCY MEDICINE

## 2024-11-10 PROCEDURE — 84484 ASSAY OF TROPONIN QUANT: CPT | Performed by: EMERGENCY MEDICINE

## 2024-11-10 PROCEDURE — 93005 ELECTROCARDIOGRAM TRACING: CPT

## 2024-11-10 PROCEDURE — 71045 X-RAY EXAM CHEST 1 VIEW: CPT | Performed by: EMERGENCY MEDICINE

## 2024-11-10 PROCEDURE — 82962 GLUCOSE BLOOD TEST: CPT

## 2024-11-10 RX ORDER — IPRATROPIUM BROMIDE AND ALBUTEROL SULFATE 2.5; .5 MG/3ML; MG/3ML
3 SOLUTION RESPIRATORY (INHALATION) ONCE
Status: COMPLETED | OUTPATIENT
Start: 2024-11-10 | End: 2024-11-10

## 2024-11-10 RX ORDER — ALBUTEROL SULFATE 90 UG/1
2 INHALANT RESPIRATORY (INHALATION) EVERY 4 HOURS PRN
Qty: 1 EACH | Refills: 0 | Status: SHIPPED | OUTPATIENT
Start: 2024-11-10 | End: 2024-12-10

## 2024-11-10 NOTE — DISCHARGE INSTRUCTIONS
Followup with cardiology and the provided primary care as discussed; use inhaler as needed, seek care for recurrent/worsening symptoms or any concern.

## 2024-11-10 NOTE — ED PROVIDER NOTES
Patient Seen in: [facility] Emergency Department    History     Chief Complaint   Patient presents with    Chest Pain Angina     Stated Complaint: COVID+, Cough     HPI    54-year-old male with past medical history of IDDM, dyslipidemia, hypertension, prior pneumonia presenting for evaluation with rhinorrhea/congestion as initially noted 1 week ago with patient started on Paxlovid, thereafter with cough in addition to ongoing myalgias/malaise.  Patient seen in immediate care, given progressive symptoms despite antiviral therapy and referred to emergency department.  Questionable chest comfort/shortness of breath without overt exertional/pleuritic complaints.  No leg swelling.  Patient with known history of pneumonia as noted, some symptoms similarity.  No recent travel, known sick contact from which COVID contracted and same recently diagnosed with pneumonia.    Past Medical History:    Other and unspecified hyperlipidemia    Pneumonia    Type I (juvenile type) diabetes mellitus without mention of complication, not stated as uncontrolled    Unspecified essential hypertension       History reviewed. No pertinent surgical history.         Family History   Problem Relation Age of Onset    Heart Disease Mother        Social History     Socioeconomic History    Marital status:    Tobacco Use    Smoking status: Never     Passive exposure: Never    Smokeless tobacco: Never   Vaping Use    Vaping status: Never Used   Substance and Sexual Activity    Alcohol use: Yes     Comment: occ    Drug use: Never   Other Topics Concern    Caffeine Concern Yes     Comment: 2cups    History of tanning No    Reaction to local anesthetic No       Review of Systems :  Constitutional: As per HPI  HENT: (+) congestion/rhinorrhea.  Respiratory: (+) cough and shortness of breath.    Cardiovascular: Negative for chest pain and palpitations.     Positive for stated complaint: COVID+, Cough  Other systems are as noted in  HPI.  Constitutional and vital signs reviewed.      All other systems reviewed and negative except as noted above.    PSFH elements reviewed from today and agreed except as otherwise stated in HPI.    Physical Exam     ED Triage Vitals [11/10/24 1128]   BP (!) 154/94   Pulse (!) 124   Resp 22   Temp 98.9 °F (37.2 °C)   Temp src Temporal   SpO2 98 %   O2 Device None (Room air)       Current:BP (!) 154/94   Pulse (!) 124   Temp 98.9 °F (37.2 °C) (Temporal)   Resp 22   Ht 182.9 cm (6')   Wt 106.6 kg   SpO2 98%   BMI 31.87 kg/m²         Physical Exam   Constitutional: No distress.   HEENT: MMM.  Head: Normocephalic.   Eyes: No injection.   Cardiovascular: Tachycardic.   Pulmonary/Chest: Bronchospastic cough with expiratory wheezing.  Abdominal: Soft. Nontender.  Musculoskeletal: No gross deformity.  Neurological: Alert.   Skin: Skin is warm.   Psychiatric: Cooperative.  Nursing note and vitals reviewed.        ED Course     Labs Reviewed   BASIC METABOLIC PANEL (8) - Abnormal; Notable for the following components:       Result Value    Glucose 222 (*)     Creatinine 1.45 (*)     eGFR-Cr 58 (*)     All other components within normal limits   TROPONIN I HIGH SENSITIVITY - Abnormal; Notable for the following components:    Troponin I (High Sensitivity) 84 (*)     All other components within normal limits   LIPID PANEL - Abnormal; Notable for the following components:    Cholesterol, Total 232 (*)     HDL Cholesterol 36 (*)     Triglycerides 337 (*)     LDL Cholesterol 135 (*)     VLDL 63 (*)     Non HDL Chol 196 (*)     All other components within normal limits   TROPONIN I HIGH SENSITIVITY - Abnormal; Notable for the following components:    Troponin I (High Sensitivity) 80 (*)     All other components within normal limits   POCT GLUCOSE - Abnormal; Notable for the following components:    POC Glucose  236 (*)     All other components within normal limits   D-DIMER - Normal   MAGNESIUM - Normal   TSH W REFLEX TO  FREE T4 - Normal   CBC WITH DIFFERENTIAL WITH PLATELET     EKG    Rate, intervals and axes as noted on EKG Report.  Rate: 110  Rhythm: Sinus Rhythm  Reading: sinus tach 110 without MAURA as independnetly inteprreted by myself           XR CHEST AP PORTABLE  (CPT=71045)    Result Date: 11/10/2024  PROCEDURE: XR CHEST AP PORTABLE  (CPT=71045) TIME: 1217 hours.   COMPARISON: [facility], XR CHEST PA + LAT CHEST (CPT=71046), 6/07/2024, 11:15 AM.  [facility], XR CHEST PA + LAT CHEST (IOV=59872), 4/19/2022, 7:24 PM.  [facility], XR CHEST PA + LAT CHEST (CPT=71020), 10/21/2017, 2:12 PM.  INDICATIONS: COVID positive, cough, shortness of breath, medial chest pain.  TECHNIQUE:   Single view.   FINDINGS:  CARDIAC/VASC: Normal.  No cardiac silhouette abnormality or cardiomegaly.  Unremarkable pulmonary vasculature.  MEDIAST/MATT:   No visible mass or adenopathy. LUNGS/PLEURA: Normal.  No significant pulmonary parenchymal abnormalities.  No effusion or pleural thickening. BONES: No fracture or visible bony lesion. OTHER: Negative.          CONCLUSION: No focal consolidation.    Dictated by (CST): Josue De León MD on 11/10/2024 at 1:12 PM     Finalized by (CST): Josue De León MD on 11/10/2024 at 1:13 PM             ED Course as of 11/10/24 1610  ------------------------------------------------------------  Time: 11/10 1522  Comment: Resting with improving symptoms after nebs, troponin as noted for which case d/w MCI and in agreement with discharge/ongoing outpatient followup.       MDM   DIFFERENTIAL DIAGNOSIS: After history and physical exam differential diagnosis includes but is not limited to ACS, VTE, anemia, PNA.    Pulse ox: 98%:Normal on RA, as independently interpreted by myself    Cardiac Monitor Interpretation:   Pulse Readings from Last 1 Encounters:   11/10/24 (!) 124   , sinus,      Medical Decision Making  Evaluation for cough/dyspnea in setting of recent COVID - CXR/dimer  unremarkable, troponin x2 as noted. Case d/w MORGAN Garcia/Dr. Romero, in agreement with ongoing outpatient followup; symptoms improving with nebs, will discharge with MDI and ongoing outpatient followup.    Problems Addressed:  Bronchospasm: acute illness or injury  COVID-19: acute illness or injury    Amount and/or Complexity of Data Reviewed  Labs: ordered. Decision-making details documented in ED Course.  Radiology: ordered and independent interpretation performed. Decision-making details documented in ED Course.     Details: CXR without obvious pneumothorax as independently interpreted by myself    ECG/medicine tests: ordered and independent interpretation performed. Decision-making details documented in ED Course.  Discussion of management or test interpretation with external provider(s): Case d/w MORGAN Garcia/Dr. Romero    Risk  Prescription drug management.        I was wearing at minimum a facemask and eye protection throughout this encounter with handwashing performed prior and after patient evaluation without personal hand/facial/oropharyngeal contact and gloves worn throughout encounter. See note and/or contact this provider for further PPE details.      Disposition and Plan     Clinical Impression:  1. Bronchospasm    2. COVID-19        Disposition:  Discharge    Follow-up:  MORGAN WARNER  [address]  [facility] [address] [phone]  Call  For cardiology followup and re-evaluation.    Carol Call MD  [address] [address] [phone]    Call  For primary care followup and evaluation/to establish care.      Medications Prescribed:  Discharge Medication List as of 11/10/2024  3:21 PM        START taking these medications    Details   albuterol 108 (90 Base) MCG/ACT Inhalation Aero Soln Inhale 2 puffs into the lungs every 4 (four) hours as needed for Wheezing., Normal, Disp-1 each, R-0

## 2024-11-10 NOTE — PROGRESS NOTES
Patient, Shoaib Guido , is a 54 year old male who presented today in clinic with cough X 1 week. Recent covid infection. Tested positive approx 1 week ago in [facility] on 11/3/24. Rx paxlvoid. Now chest tightness/chest congestion and shortness of breath. No leg pain or swelling. PT reports winded with minimal effort ie. Tying shoes.     Pt has hx of type 1 diabetes and CKD.     Eating and drinking well.     Blood glucose this AM was 250    Vitals:    11/10/24 1037 11/10/24 1039 11/10/24 1045   BP: 140/90 136/90    Pulse: (!) 122  (!) 121   Resp: 18     Temp: 97 °F (36.1 °C)  99.1 °F (37.3 °C)   TempSrc:   Oral   SpO2: 97%  97%   Weight: 235 lb (106.6 kg)     Height: 6' (1.829 m)           No results found for this or any previous visit (from the past week).    Exam:    GENERAL:  well nourished, in no apparent distress, sweating +  SKIN: no rashes,no suspicious lesions  EYES: conjunctiva clear  EARS: TM's gray, no  bulging, no retraction,no fluid, bony landmarks  visible  NOSE: Nostrils patent, clear nasal discharge, nasal mucosa pink   THROAT: Oral mucosa pink, moist. Posterior pharynx is not erythematous. no exudates. Tonsils 1/4. Uvula midline   NECK: Supple, non-tender  LUNGS: clear to auscultation bilaterally, no wheezes or rhonchi. Breathing is non labored. Speaking in full sentences without hesitation  CARDIO: Elevated HR  EXTREMITIES: no cyanosis, clubbing or edema  NEURO: No focal deficits   Accompanied by: self.   Discussed ambulance with patient. Pt stated feel safe driving himself to ER. If starting to feel worse, will pull over and call 911.  After triage and detailed discussion with patient/familiy, it was determined that they would benefit from a higher acuity or more comprehensive level of care.  I informed them of the scope of practice of the Walk-in [facility] and advised the be further evaluated at [facility] due to concern of tachycardia, SOB, and recent covid infection  Reported  to: ALN Terry at [facility] [place] .  Patient/parent /family verbalized understanding of rationale for further evaluation and was stable upon departure from the Walk-in Clinic.

## 2024-11-11 LAB
ATRIAL RATE: 104 BPM
ATRIAL RATE: 110 BPM
P AXIS: 53 DEGREES
P AXIS: 54 DEGREES
P-R INTERVAL: 156 MS
P-R INTERVAL: 160 MS
Q-T INTERVAL: 316 MS
Q-T INTERVAL: 338 MS
QRS DURATION: 86 MS
QRS DURATION: 86 MS
QTC CALCULATION (BEZET): 427 MS
QTC CALCULATION (BEZET): 444 MS
R AXIS: 38 DEGREES
R AXIS: 53 DEGREES
T AXIS: 23 DEGREES
T AXIS: 33 DEGREES
VENTRICULAR RATE: 104 BPM
VENTRICULAR RATE: 110 BPM

## 2024-11-19 ENCOUNTER — HOSPITAL ENCOUNTER (OUTPATIENT)
Age: 54
Discharge: HOME | End: 2024-11-19
Payer: COMMERCIAL

## 2024-11-19 ENCOUNTER — APPOINTMENT (OUTPATIENT)
Dept: GENERAL RADIOLOGY | Age: 54
End: 2024-11-19
Attending: PHYSICIAN ASSISTANT
Payer: COMMERCIAL

## 2024-11-19 VITALS
SYSTOLIC BLOOD PRESSURE: 109 MMHG | RESPIRATION RATE: 19 BRPM | OXYGEN SATURATION: 96 % | HEART RATE: 101 BPM | TEMPERATURE: 99 F | DIASTOLIC BLOOD PRESSURE: 72 MMHG

## 2024-11-19 DIAGNOSIS — R05.1 ACUTE COUGH: ICD-10-CM

## 2024-11-19 DIAGNOSIS — U07.1 COVID-19 VIRUS INFECTION: Primary | ICD-10-CM

## 2024-11-19 PROCEDURE — 99213 OFFICE O/P EST LOW 20 MIN: CPT | Performed by: PHYSICIAN ASSISTANT

## 2024-11-19 PROCEDURE — 71046 X-RAY EXAM CHEST 2 VIEWS: CPT | Performed by: PHYSICIAN ASSISTANT

## 2024-11-19 RX ORDER — PREDNISONE 20 MG/1
40 TABLET ORAL DAILY
Qty: 10 TABLET | Refills: 0 | Status: SHIPPED | OUTPATIENT
Start: 2024-11-19 | End: 2024-11-24

## 2024-11-19 RX ORDER — BENZONATATE 100 MG/1
100 CAPSULE ORAL 3 TIMES DAILY PRN
Qty: 30 CAPSULE | Refills: 0 | Status: SHIPPED | OUTPATIENT
Start: 2024-11-19 | End: 2024-12-19

## 2024-11-19 RX ORDER — CODEINE PHOSPHATE AND GUAIFENESIN 10; 100 MG/5ML; MG/5ML
SOLUTION ORAL 3 TIMES DAILY PRN
Qty: 50 ML | Refills: 0 | Status: SHIPPED | OUTPATIENT
Start: 2024-11-19

## 2024-11-19 NOTE — ED INITIAL ASSESSMENT (HPI)
Diagnosed with Covid three weeks ago, continued cough. Patient denies fever, reports taking inhaler with minimal relief and OTC mucinex. Patient reports \"junk in chest\".

## 2024-11-19 NOTE — ED PROVIDER NOTES
Patient Seen in: [facility]    History     Chief Complaint   Patient presents with    Cough     Stated Complaint: COVID + cough and congestion    HPI    Shoaib Guido is a 54 year old male presents with chief complaint of cough.  Onset 3 weeks ago.  Patient states that he tested positive for COVID-19 3 weeks ago.  Patient states cough continues despite using inhaler.  Patient denies fever, chills, earache, nasal drainage, sore throat, abdominal pain, nausea, vomiting, diarrhea, constipation, dysuria, hematuria, flank pain, rash, neck pain, neck swelling, restricted neck movement, dyspnea, wheeze, hemoptysis, extremity pain, extremity swelling.      Past Medical History:    Other and unspecified hyperlipidemia    Pneumonia    Type I (juvenile type) diabetes mellitus without mention of complication, not stated as uncontrolled    Unspecified essential hypertension       History reviewed. No pertinent surgical history.         Family History   Problem Relation Age of Onset    Heart Disease Mother        Social History     Socioeconomic History    Marital status:    Tobacco Use    Smoking status: Never     Passive exposure: Never    Smokeless tobacco: Never   Vaping Use    Vaping status: Never Used   Substance and Sexual Activity    Alcohol use: Yes     Comment: occ    Drug use: Never   Other Topics Concern    Caffeine Concern Yes     Comment: 2cups    History of tanning No    Reaction to local anesthetic No       Review of Systems    Positive for stated complaint: COVID + cough and congestion  Other systems are as noted in HPI.  Constitutional and vital signs reviewed.      All other systems reviewed and negative except as noted above.    PSFH elements reviewed from today and agreed except as otherwise stated in HPI.    Physical Exam     ED Triage Vitals [11/19/24 0812]   /72   Pulse 101   Resp 19   Temp 98.5 °F (36.9 °C)   Temp src Oral   SpO2 96 %   O2 Device None (Room air)        Current:/72   Pulse 101   Temp 98.5 °F (36.9 °C) (Oral)   Resp 19   SpO2 96%     PULSE OX within normal limits on room air as interpreted by this provider.     Constitutional: The patient is cooperative. Appears well-developed and well-nourished.  No acute distress.  Psychological: Alert, No abnormalities of mood, affect.  Head: Normocephalic/atraumatic.   Eyes: Pupils are equal round reactive to light.  Conjunctiva are within normal limits.  ENT: Pharynx noninjected.  Tonsils within normal size limits bilaterally.  No tonsillar exudates.  TMs within normal limits bilaterally.  Mucous membranes moist.  Neck: The neck is supple.  Nontender.  No meningeal signs.  Chest: There is no tenderness to the chest wall.  No CVA tenderness bilaterally.  Respiratory: Respiratory effort was normal.  There is no rales, wheezes, or rhonchi.  There is no stridor.  Air entry is equal.  Cardiovascular: Tachycardic, regular rhythm.  Capillary refill is brisk.    Lymphatic: No gross lymphadenopathy noted.  Musculoskeletal: Musculoskeletal system is grossly intact.  There is no obvious deformity.  Neurological: Gross motor movement is intact in all 4 extremities.  Patient exhibits normal speech.  Skin: Skin is normal to inspection.  Warm and dry.  No obvious bruising.  No obvious rash.        ED Course   Labs Reviewed - No data to display    MDM     Differential diagnosis including but not limited to URI, bronchitis, pneumonia, COVID-19.    Radiology findings:     XR CHEST PA + LAT CHEST (CPT=71046) (Final result)  Result time 11/19/24 08:45:27  Final result by Yoni Walls MD (11/19/24 08:45:27)                Narrative:    PROCEDURE: XR CHEST PA + LAT CHEST (CPT=71046)     COMPARISON: [facility], XR CHEST PA + LAT CHEST (CPT=71046), 6/07/2024, 11:15 AM.     INDICATIONS: Cough, congestion and shortness of breath x 3 weeks. Covid+ 3 weeks ago     TECHNIQUE:   Two views.       Impression:  Normal  heart size, clear lungs, normal pleura  Finalized by (CST): Gurinder Walls MD on 11/19/2024 at 8:45 AM                Chest x-ray images independently reviewed by this provider-no pneumonia.    Physical exam remained stable over serial reexaminations as previously documented.  Results reviewed with patient.    I have given the patient instructions regarding their diagnoses, expectations, follow up, and ER precautions. I explained to the patient that emergent conditions may arise and to go to the ER for new, worsening or any persistent conditions. I've explained the importance of following up with their doctor as instructed. The patient verbalized understanding of the discharge instructions and plan.        Disposition and Plan     Clinical Impression:  1. COVID-19 virus infection    2. Acute cough        Disposition:  Discharge    Follow-up:  Chava Topete MD  [address] [place] [address] [address]  [facility] [address]  [phone]    Call in 1 day  For follow-up    Branden Weaver MD  [facility] [address] [facility] [address]  [facility] [id number] [phone]    Call in 1 day  For follow-up      Medications Prescribed:  Discharge Medication List as of 11/19/2024  8:51 AM        START taking these medications    Details   predniSONE 20 MG Oral Tab Take 2 tablets (40 mg total) by mouth daily for 5 days., Normal, Disp-10 tablet, R-0      benzonatate 100 MG Oral Cap Take 1 capsule (100 mg total) by mouth 3 (three) times daily as needed for cough., Normal, Disp-30 capsule, R-0      guaiFENesin-codeine 100-10 MG/5ML Oral Solution Take 2.5-5 mL by mouth 3 (three) times daily as needed for cough., Normal, Disp-50 mL, R-0

## 2024-12-09 ENCOUNTER — HOSPITAL ENCOUNTER (OUTPATIENT)
Age: 54
Discharge: HOME | End: 2024-12-09
Payer: COMMERCIAL

## 2024-12-09 VITALS
HEART RATE: 94 BPM | OXYGEN SATURATION: 96 % | TEMPERATURE: 98 F | RESPIRATION RATE: 18 BRPM | SYSTOLIC BLOOD PRESSURE: 133 MMHG | DIASTOLIC BLOOD PRESSURE: 77 MMHG

## 2024-12-09 DIAGNOSIS — J45.909 REACTIVE AIRWAY DISEASE WITHOUT COMPLICATION, UNSPECIFIED ASTHMA SEVERITY, UNSPECIFIED WHETHER PERSISTENT (HCC): Primary | ICD-10-CM

## 2024-12-09 PROCEDURE — 99213 OFFICE O/P EST LOW 20 MIN: CPT

## 2024-12-09 RX ORDER — PREDNISONE 20 MG/1
60 TABLET ORAL DAILY
Qty: 21 TABLET | Refills: 0 | Status: SHIPPED | OUTPATIENT
Start: 2024-12-09 | End: 2024-12-16

## 2024-12-10 NOTE — ED PROVIDER NOTES
Patient Seen in: [facility]      History   No chief complaint on file.    Stated Complaint: COVID 5 wks ago still sick    Subjective:   HPI    54-year-old male presents with persisting cough.  Patient states he had COVID diagnosed 5 weeks ago.  He still is having the cough.  He has been seen in the emergency room and then 1 other visit in [facility].  He was treated with cough suppressant and steroids.  He states he is coughing so hard it is making him vomit.      Objective:     Past Medical History:    Other and unspecified hyperlipidemia    Pneumonia    Type I (juvenile type) diabetes mellitus without mention of complication, not stated as uncontrolled    Unspecified essential hypertension              History reviewed. No pertinent surgical history.             No pertinent social history.            Review of Systems    Positive for stated complaint: COVID 5 wks ago still sick  Other systems are as noted in HPI.  Constitutional and vital signs reviewed.      All other systems reviewed and negative except as noted above.    Physical Exam     ED Triage Vitals [12/09/24 1845]   /77   Pulse 94   Resp 18   Temp 98.3 °F (36.8 °C)   Temp src Oral   SpO2 96 %   O2 Device None (Room air)       Current Vitals:   Vital Signs  BP: 133/77  Pulse: 94  Resp: 18  Temp: 98.3 °F (36.8 °C)  Temp src: Oral    Oxygen Therapy  SpO2: 96 %  O2 Device: None (Room air)        Physical Exam  Vitals reviewed.   Constitutional:       General: He is not in acute distress.     Appearance: He is not ill-appearing.   HENT:      Right Ear: Tympanic membrane, ear canal and external ear normal.      Left Ear: Tympanic membrane, ear canal and external ear normal.      Nose: Nose normal.      Mouth/Throat:      Mouth: Mucous membranes are moist.      Pharynx: No oropharyngeal exudate or posterior oropharyngeal erythema.      Comments: PND  Cardiovascular:      Rate and Rhythm: Normal rate and regular rhythm.    Pulmonary:      Effort: Pulmonary effort is normal.      Breath sounds: Normal breath sounds.   Musculoskeletal:         General: Normal range of motion.      Cervical back: Normal range of motion and neck supple.   Skin:     General: Skin is warm and dry.   Neurological:      General: No focal deficit present.      Mental Status: He is alert and oriented to person, place, and time.   Psychiatric:         Mood and Affect: Mood normal.         Behavior: Behavior normal.             ED Course   Labs Reviewed - No data to display                MDM              Medical Decision Making  52-year-old male presents with cough.  Differential diagnosis includes reactive airway disease, postnasal drip, viral upper respiratory infection.  Patient was diagnosed with COVID 5 weeks ago.  He has been seen in immediate care twice since then.  He was given cough suppressant and steroids last time he was in immediate care.  He states that he still is having the cough with a lot of posttussive emesis.  Patient is afebrile.  His lungs are clear to auscultate.  Patient was on a low-dose short course of prednisone.  Will treat patient again with slightly higher dose.  He was also instructed to start antihistamines.  He may also add Flonase.    Risk  OTC drugs.  Prescription drug management.        Disposition and Plan     Clinical Impression:  1. Reactive airway disease without complication, unspecified asthma severity, unspecified whether persistent (HCC)         Disposition:  Discharge  12/9/2024  7:07 pm    Follow-up:  No follow-up provider specified.        Medications Prescribed:  Discharge Medication List as of 12/9/2024  7:07 PM              Supplementary Documentation:

## 2024-12-10 NOTE — ED INITIAL ASSESSMENT (HPI)
Patient reports hx of covid 5 weeks ago.  Reports continued post nasal drip, cough.  Denies fevers, chills body aches.  Has had multiple chest x rays during this time that were negative.  Also prescribed steroids, albuterol inhaler and codeine cough syrup without resolution of symptoms.

## 2024-12-11 NOTE — TELEPHONE ENCOUNTER
Last virtual visit 4/23/20  RTC 3 months  No F/U scheduled  Last filled by Dr. Keith Laguna reminder sent to patient reminding to schedule a follow up.     90 day refill pending for follow up. not tested

## 2024-12-18 ENCOUNTER — APPOINTMENT (OUTPATIENT)
Dept: URBAN - METROPOLITAN AREA CLINIC 244 | Age: 54
Setting detail: DERMATOLOGY
End: 2024-12-18

## 2024-12-18 ENCOUNTER — RX ONLY (RX ONLY)
Age: 54
End: 2024-12-18

## 2024-12-18 DIAGNOSIS — L81.4 OTHER MELANIN HYPERPIGMENTATION: ICD-10-CM

## 2024-12-18 DIAGNOSIS — D22 MELANOCYTIC NEVI: ICD-10-CM

## 2024-12-18 DIAGNOSIS — L66.11 CLASSIC LICHEN PLANOPILARIS: ICD-10-CM

## 2024-12-18 PROBLEM — D22.9 MELANOCYTIC NEVI, UNSPECIFIED: Status: ACTIVE | Noted: 2024-12-18

## 2024-12-18 PROCEDURE — OTHER COUNSELING: OTHER

## 2024-12-18 PROCEDURE — OTHER MIPS QUALITY: OTHER

## 2024-12-18 PROCEDURE — OTHER OTC TREATMENT REGIMEN: OTHER

## 2024-12-18 PROCEDURE — 99213 OFFICE O/P EST LOW 20 MIN: CPT

## 2024-12-18 PROCEDURE — OTHER PRESCRIPTION MEDICATION MANAGEMENT: OTHER

## 2024-12-18 PROCEDURE — OTHER DIAGNOSIS COMMENT: OTHER

## 2024-12-18 RX ORDER — CLINDAMYCIN PHOSPHATE 10 MG/ML
SOLUTION TOPICAL
Qty: 120 | Refills: 11 | Status: ERX

## 2024-12-18 ASSESSMENT — LOCATION ZONE DERM: LOCATION ZONE: SCALP

## 2024-12-18 ASSESSMENT — LOCATION SIMPLE DESCRIPTION DERM: LOCATION SIMPLE: POSTERIOR SCALP

## 2024-12-18 ASSESSMENT — LOCATION DETAILED DESCRIPTION DERM: LOCATION DETAILED: POSTERIOR MID-PARIETAL SCALP

## 2024-12-18 NOTE — PROCEDURE: DIAGNOSIS COMMENT
Render Risk Assessment In Note?: no
Comment: he says he did not need the topical fluocinonide.\\ngood responder to po doxy in past. just needing topical clindamycin q3 days 
Detail Level: Simple

## 2024-12-19 RX ORDER — INSULIN LISPRO 100 [IU]/ML
INJECTION, SOLUTION INTRAVENOUS; SUBCUTANEOUS
Qty: 45 ML | Refills: 1 | Status: SHIPPED | OUTPATIENT
Start: 2024-12-19

## 2024-12-19 NOTE — TELEPHONE ENCOUNTER
Endocrine refill protocol for basal insulins     Protocol Criteria: FAILED Reason: Elevated A1C    If all below requirements are met, send a 90-day supply with 1 refill per provider protocol.       Verify Appointment with Endocrinology completed in the last 6 months or scheduled in the next 3 months.  Verify A1C has been completed within the last 6 months and is below 8.5%     Last completed office visit:6/24/2024 Taylor Gurrola MD   Next scheduled Follow up:   Future Appointments   Date Time Provider Department Center   2/13/2025  6:15 PM Taylor Gurrola MD [facility]      Last A1c result: Last A1c value was 9.4% done 6/24/2024.

## 2025-01-07 RX ORDER — PEN NEEDLE, DIABETIC 31 GX5/16"
NEEDLE, DISPOSABLE MISCELLANEOUS
Qty: 100 EACH | Refills: 3 | Status: SHIPPED | OUTPATIENT
Start: 2025-01-07

## 2025-01-07 RX ORDER — PROCHLORPERAZINE 25 MG/1
1 SUPPOSITORY RECTAL CONTINUOUS
Qty: 1 EACH | Refills: 3 | Status: SHIPPED | OUTPATIENT
Start: 2025-01-07

## 2025-01-07 RX ORDER — PROCHLORPERAZINE 25 MG/1
1 SUPPOSITORY RECTAL
Qty: 9 EACH | Refills: 1 | Status: SHIPPED | OUTPATIENT
Start: 2025-01-07

## 2025-01-07 NOTE — TELEPHONE ENCOUNTER
Endocrine Refill protocol for Glucose testing supplies     Protocol Criteria: PASSED Reason: N/A    If below requirement is met, send a 90-day supply with 1 refill per provider protocol.    Verify appointment with Endocrinology completed in the last 6 months or scheduled in the next 3 months.    Last completed office visit: 6/24/2024 Taylor Gurrola MD   Next scheduled Follow up:   Future Appointments   Date Time Provider Department Center   2/13/2025  6:15 PM [facility]

## 2025-01-07 NOTE — TELEPHONE ENCOUNTER
Endocrine Refill protocol for CGM supplies     Protocol Criteria:  PASSED Reason: N/A    If below requirement is met, send a 90-day supply with 1 refill per provider protocol.     Verify appointment with Endocrinology completed in the last 12 months or scheduled in the next 6 months     Last completed office visit:6/24/2024 Taylor Gurrola MD   Next scheduled Follow up:   Future Appointments   Date Time Provider Department Center   2/13/2025  6:15 PM [facility]

## 2025-02-13 ENCOUNTER — OFFICE VISIT (OUTPATIENT)
Dept: ENDOCRINOLOGY CLINIC | Facility: CLINIC | Age: 55
End: 2025-02-13

## 2025-02-13 VITALS
HEART RATE: 106 BPM | BODY MASS INDEX: 33 KG/M2 | SYSTOLIC BLOOD PRESSURE: 165 MMHG | DIASTOLIC BLOOD PRESSURE: 93 MMHG | WEIGHT: 244 LBS

## 2025-02-13 DIAGNOSIS — E87.0 DM (DIABETES MELLITUS), TYPE 1, UNCONTROLLED, WITH HYPEROSMOLARITY (HCC): Primary | ICD-10-CM

## 2025-02-13 DIAGNOSIS — E78.5 HYPERLIPIDEMIA, UNSPECIFIED HYPERLIPIDEMIA TYPE: ICD-10-CM

## 2025-02-13 DIAGNOSIS — E11.9 DIABETES MELLITUS WITH FEATURES OF INSULIN RESISTANCE (HCC): ICD-10-CM

## 2025-02-13 DIAGNOSIS — E10.69 DM (DIABETES MELLITUS), TYPE 1, UNCONTROLLED, WITH HYPEROSMOLARITY (HCC): Primary | ICD-10-CM

## 2025-02-13 DIAGNOSIS — I10 HYPERTENSION, UNSPECIFIED TYPE: ICD-10-CM

## 2025-02-13 DIAGNOSIS — E10.65 DM (DIABETES MELLITUS), TYPE 1, UNCONTROLLED, WITH HYPEROSMOLARITY (HCC): Primary | ICD-10-CM

## 2025-02-13 LAB
GLUCOSE BLOOD: 126
HEMOGLOBIN A1C: 9.4 % (ref 4.3–5.6)
TEST STRIP LOT #: NORMAL NUMERIC

## 2025-02-13 PROCEDURE — 3046F HEMOGLOBIN A1C LEVEL >9.0%: CPT | Performed by: INTERNAL MEDICINE

## 2025-02-13 PROCEDURE — 83036 HEMOGLOBIN GLYCOSYLATED A1C: CPT | Performed by: INTERNAL MEDICINE

## 2025-02-13 PROCEDURE — 3077F SYST BP >= 140 MM HG: CPT | Performed by: INTERNAL MEDICINE

## 2025-02-13 PROCEDURE — 3080F DIAST BP >= 90 MM HG: CPT | Performed by: INTERNAL MEDICINE

## 2025-02-13 PROCEDURE — 82947 ASSAY GLUCOSE BLOOD QUANT: CPT | Performed by: INTERNAL MEDICINE

## 2025-02-13 PROCEDURE — 99215 OFFICE O/P EST HI 40 MIN: CPT | Performed by: INTERNAL MEDICINE

## 2025-02-13 PROCEDURE — 95251 CONT GLUC MNTR ANALYSIS I&R: CPT | Performed by: INTERNAL MEDICINE

## 2025-02-13 RX ORDER — SEMAGLUTIDE 0.68 MG/ML
0.5 INJECTION, SOLUTION SUBCUTANEOUS WEEKLY
Qty: 9 ML | Refills: 1 | Status: SHIPPED | OUTPATIENT
Start: 2025-02-13

## 2025-02-13 RX ORDER — ACYCLOVIR 400 MG/1
1 TABLET ORAL
Qty: 9 EACH | Refills: 1 | Status: SHIPPED | OUTPATIENT
Start: 2025-02-13

## 2025-02-14 ENCOUNTER — TELEPHONE (OUTPATIENT)
Dept: ENDOCRINOLOGY CLINIC | Facility: CLINIC | Age: 55
End: 2025-02-14

## 2025-02-14 DIAGNOSIS — E87.0 DM (DIABETES MELLITUS), TYPE 1, UNCONTROLLED, WITH HYPEROSMOLARITY (HCC): Primary | ICD-10-CM

## 2025-02-14 DIAGNOSIS — E10.65 DM (DIABETES MELLITUS), TYPE 1, UNCONTROLLED, WITH HYPEROSMOLARITY (HCC): Primary | ICD-10-CM

## 2025-02-14 DIAGNOSIS — E10.69 DM (DIABETES MELLITUS), TYPE 1, UNCONTROLLED, WITH HYPEROSMOLARITY (HCC): Primary | ICD-10-CM

## 2025-02-14 NOTE — TELEPHONE ENCOUNTER
Current Outpatient Medications   Medication Sig Dispense Refill           semaglutide (OZEMPIC, 0.25 OR 0.5 MG/DOSE,) 2 MG/3ML Subcutaneous Solution Pen-injector Inject 0.5 mg into the skin once a week. 9 mL 1                  GARCIA:[id number]

## 2025-02-14 NOTE — PATIENT INSTRUCTIONS
START Ozempic 0.25mg subcutaneous weekly for one month then increase to 0.5mg subcutaneous weekly     CONTINUE current dose of Semglee and Lispro    CHECK yearly labs

## 2025-02-14 NOTE — PROGRESS NOTES
Name: Shoaib Guido  Date: 2/13/2025    [id number] OF PRESENT ILLNESS   Shoaib Guido is a 54 year old male who presents for diabetes mellitus.         Prior HbA, C or glycohemoglobin were 9.3% 3/2014; 8.3% 10/2014; 8.9% 6/2015; 9.1% 1/2016; 10.2% 8/2016; 10.1% 4/2017; 10.0% 1/2018; 11.7% 6/2018; 9.8% 8/2018; 11.5% 2/2019; 10.4% 10/2019; 10.5% 2/2021; 10.1% 9/2021; 10.3% 3/2022; 9.0% 7/2022; 12.5% 3/2023; 8.5% 6/2023; 8.3% 10/2023; 9.4% 6/2024; 9.4% POC Today     Dietary compliance: Good   Exercise: No   Polyuria/polydipsia: No  Blurred vision: No    Episodes of hypoglycemia: Rarely - nocturnal    Blood Glucose:  Checking 4 times per day  Reviewed CGM    Medications for DM  Semglee 32 units subcutaneous BID  Humalog 20 units subcutaneous TID with meals plus CF     Omnipod Pump discontinued due to tape allergy       REVIEW OF SYSTEMS  Eyes: Diabetic retinopathy present: No            Most recent visit to eye doctor in last 12 months: scheduled for 11/2023      CV: Cardiovascular disease present: No         Hypertension present: Yes         Hyperlipidemia present: Yes         Peripheral Vascular Disease present: Yes, CVA 1/2022 with some persistent numbness     : Nephropathy present: No    Neuro: Neuropathy present: Yes    Skin: Infection or ulceration: No    Osteoporosis: No    Thyroid disease: No      Medications:     Current Outpatient Medications:     Continuous Glucose Transmitter (DEXCOM G6 TRANSMITTER) Does not apply Misc, 1 Device continuous., Disp: 1 each, Rfl: 3    Continuous Glucose Sensor (DEXCOM G6 SENSOR) Does not apply Misc, 1 each Every 10 days., Disp: 9 each, Rfl: 1    Insulin Pen Needle (BD PEN NEEDLE SHORT U/F) 31G X 8 MM Does not apply Misc, Inject 4 times daily, Disp: 100 each, Rfl: 3    Insulin Lispro, 1 Unit Dial, 100 UNIT/ML Subcutaneous Solution Pen-injector, INJECT 20 UNITS INTO THE SKIN IN THE MORNING AND 20 UNITS AT NOON AND 20 UNITS IN THE BEDTIME., Disp: 45 mL, Rfl: 1     ATORVASTATIN CALCIUM OR, Take by mouth., Disp: , Rfl:     Insulin Glargine-yfgn 100 UNIT/ML Subcutaneous Solution Pen-injector, Inject 32 Units into the skin 2 (two) times daily., Disp: 60 mL, Rfl: 1    amLODIPine 10 MG Oral Tab, Take 1 tablet (10 mg total) by mouth daily., Disp: 90 tablet, Rfl: 1    metoprolol succinate  MG Oral Tablet 24 Hr, Take 1 tablet (100 mg total) by mouth daily., Disp: 90 tablet, Rfl: 0    Irbesartan-hydroCHLOROthiazide 150-12.5 MG Oral Tab, Take 2 tablets by mouth daily., Disp: 180 tablet, Rfl: 1    triamcinolone 0.5 % External Cream, Apply to affected area twice daily, Disp: 15 g, Rfl: 1    Continuous Blood Gluc Sensor (DEXCOM G6 SENSOR) Does not apply Misc, 1 each Every 10 days., Disp: 9 each, Rfl: 3    Continuous Blood Gluc Transmit (DEXCOM G6 TRANSMITTER) Does not apply Misc, 1 each every 3 (three) months., Disp: 1 each, Rfl: 3    Insulin Disposable Pump (OMNIPOD 5 G6 INTRO, GEN 5,) Does not apply Kit, 1 kit As Directed. Use for insulin pump, Disp: 1 kit, Rfl: 0    loratadine 10 MG Oral Tab, Take 1 tablet (10 mg total) by mouth daily., Disp: 30 tablet, Rfl: 0    aspirin (ASPIRIN 81) 81 MG Oral Tab EC, Take 1 tablet (81 mg total) by mouth daily., Disp: 90 tablet, Rfl: 3    Continuous Blood Gluc  (DEXCOM G6 ) Does not apply Device, 1 Device by Does not apply route continuous., Disp: 1 Device, Rfl: 0    guaiFENesin-codeine 100-10 MG/5ML Oral Solution, Take 2.5-5 mL by mouth 3 (three) times daily as needed for cough., Disp: 50 mL, Rfl: 0    Insulin Lispro, 1 Unit Dial, (HUMALOG KWIKPEN) 100 UNIT/ML Subcutaneous Solution Pen-injector, Inject 20 Units into the skin in the morning, at noon, and at bedtime. (Patient not taking: Reported on 11/10/2024), Disp: 45 mL, Rfl: 1    Insulin Lispro, 1 Unit Dial, 100 UNIT/ML Subcutaneous Solution Pen-injector, Inject 20 Units into the skin in the morning and 20 Units at noon and 20 Units in the bedtime., Disp: 15 mL, Rfl: 0     Insulin Glargine-yfgn (SEMGLEE, YFGN,) 100 UNIT/ML Subcutaneous Solution Pen-injector, Inject 56 Units into the skin daily., Disp: 60 mL, Rfl: 1    Insulin Disposable Pump (OMNIPOD 5 G6 POD, GEN 5,) Does not apply Misc, 1 each every other day. Change pod every 48 hours, Disp: 9 each, Rfl: 0     Allergies:   Allergies   Allergen Reactions    Bees RASH       Social History:   Social History     Socioeconomic History    Marital status:    Tobacco Use    Smoking status: Never     Passive exposure: Never    Smokeless tobacco: Never   Vaping Use    Vaping status: Never Used   Substance and Sexual Activity    Alcohol use: Yes     Comment: occ    Drug use: Never   Other Topics Concern    Caffeine Concern Yes     Comment: 2cups    History of tanning No    Reaction to local anesthetic No       Medical History:   Past Medical History:    Other and unspecified hyperlipidemia    Pneumonia    Type I (juvenile type) diabetes mellitus without mention of complication, not stated as uncontrolled    Unspecified essential hypertension       Surgical history:   No past surgical history on file.      PHYSICAL EXAM  BP (!) 166/98   Pulse 106   Wt 244 lb (110.7 kg)   BMI 33.09 kg/m²     General Appearance:  alert, well developed, in no acute distress  Eyes:  normal conjunctivae, sclera., normal sclera and normal pupils  Throat/Neck: normal sound to voice.   Back: no kyphosis  Respiratory:  non-labored. no increased work of breathing.    Lymph Nodes:  No abnormal nodes noted  Skin:  normal moisture and skin texture  Hematologic:  no excessive bruising    ASSESSMENT/PLAN:      1. Diabetes Mellitus Type 1, Uncontrolled  -Uncontrolled, HgA1c 9.4% -->stable   -He admits to not watching diet  -He has tried/failed 2 different insulin pumps   -Discussed at length the importance of regular follow up and importance of decreasing BG levels  -Discussed importance of glycemic control to prevent complications of diabetes  -Discussed  complications of diabetes include retinopathy, neuropathy, nephropathy and cardiovascular disease  -Discussed importance of SBGM  -Discussed importance of CHO counting  -Discussed importance of restarting dexcom   -Discussed that he is starting to develop complications of DM1 including CKD and CVA   -Discussed importance of glycemic control at length   -Insulin pump d/c'd due to tape allergy  -Continue Semglee 32 units subcutaneous BID  -Continue Humalog 20 units subcutaneous TID with meals  -Continue  CF 1:20>140   -Start Ozempic 0.5mg subcutaneous weekly, verbalized understanding of risks and benefits   -Continue Dexcom CGM -->upgrade to G7   -BP elevated, stable on repeat  -He is on 4 medications, will monitor at home and send via MyChart   -Referred to nephrology given persistent hypertension and CKD stage II   -Continue hydrochlorothiazide to 25mg PO daily   -Continue Irbesartan, amlodipine, metoprolol   -LDL elevated on atorvastatin, recheck   -Recheck yearly labs   -Normal foot exam performed 2024       DM quality measures:  A1C/Blood pressure: as reported above   Nephropathy screening: ordered continue ace /arb rx.   LIPID screenin2024 . on statin rx.   Last dilated eye exam:    Last diabetic foot exam: No data recorded  Dentist : recommend every 6m    Reviewed CGM during OV and adjusted insulin as noted above.     RTC 4 months     2025  Taylor Gurrola MD

## 2025-02-16 NOTE — TELEPHONE ENCOUNTER
Unable to complete via Epic and also unable to complete in covermymeds.   Insurance requires a phone call.  They are closed today 2/15/25.           Medication PA Requested: ozempic 2mg/3ml                                                     CoverMyMeds Used: Yes - but see above.   Key:  Quantity:  Day Supply: 90  Sig: Inject 0.5 mg under the skin once  a week  DX Code:   E11.9                                  CPT code (if applicable):   Case Number/Pending Ref#:

## 2025-02-28 ENCOUNTER — LAB ENCOUNTER (OUTPATIENT)
Dept: LAB | Facility: HOSPITAL | Age: 55
End: 2025-02-28
Attending: INTERNAL MEDICINE
Payer: COMMERCIAL

## 2025-02-28 DIAGNOSIS — E10.69 DM (DIABETES MELLITUS), TYPE 1, UNCONTROLLED, WITH HYPEROSMOLARITY (HCC): ICD-10-CM

## 2025-02-28 DIAGNOSIS — E87.0 DM (DIABETES MELLITUS), TYPE 1, UNCONTROLLED, WITH HYPEROSMOLARITY (HCC): ICD-10-CM

## 2025-02-28 DIAGNOSIS — E10.65 DM (DIABETES MELLITUS), TYPE 1, UNCONTROLLED, WITH HYPEROSMOLARITY (HCC): ICD-10-CM

## 2025-02-28 LAB
ALBUMIN SERPL-MCNC: 4.8 G/DL (ref 3.2–4.8)
ALBUMIN/GLOB SERPL: 1.8 {RATIO} (ref 1–2)
ALP LIVER SERPL-CCNC: 89 U/L
ALT SERPL-CCNC: 30 U/L
ANION GAP SERPL CALC-SCNC: 8 MMOL/L (ref 0–18)
AST SERPL-CCNC: 20 U/L (ref ?–34)
BASOPHILS # BLD AUTO: 0.07 X10(3) UL (ref 0–0.2)
BASOPHILS NFR BLD AUTO: 0.8 %
BILIRUB SERPL-MCNC: 0.6 MG/DL (ref 0.3–1.2)
BUN BLD-MCNC: 17 MG/DL (ref 9–23)
BUN/CREAT SERPL: 10.8 (ref 10–20)
CALCIUM BLD-MCNC: 9.2 MG/DL (ref 8.7–10.4)
CHLORIDE SERPL-SCNC: 102 MMOL/L (ref 98–112)
CHOLEST SERPL-MCNC: 180 MG/DL (ref ?–200)
CO2 SERPL-SCNC: 26 MMOL/L (ref 21–32)
CREAT BLD-MCNC: 1.58 MG/DL
CREAT UR-SCNC: 44.2 MG/DL
DEPRECATED RDW RBC AUTO: 39.5 FL (ref 35.1–46.3)
EGFRCR SERPLBLD CKD-EPI 2021: 52 ML/MIN/1.73M2 (ref 60–?)
EOSINOPHIL # BLD AUTO: 0.67 X10(3) UL (ref 0–0.7)
EOSINOPHIL NFR BLD AUTO: 8 %
ERYTHROCYTE [DISTWIDTH] IN BLOOD BY AUTOMATED COUNT: 12.5 % (ref 11–15)
FASTING PATIENT LIPID ANSWER: NO
FASTING STATUS PATIENT QL REPORTED: NO
GLOBULIN PLAS-MCNC: 2.7 G/DL (ref 2–3.5)
GLUCOSE BLD-MCNC: 169 MG/DL (ref 70–99)
HCT VFR BLD AUTO: 45.3 %
HDLC SERPL-MCNC: 40 MG/DL (ref 40–59)
HGB BLD-MCNC: 15.5 G/DL
IMM GRANULOCYTES # BLD AUTO: 0.04 X10(3) UL (ref 0–1)
IMM GRANULOCYTES NFR BLD: 0.5 %
LDLC SERPL CALC-MCNC: 111 MG/DL (ref ?–100)
LYMPHOCYTES # BLD AUTO: 1.88 X10(3) UL (ref 1–4)
LYMPHOCYTES NFR BLD AUTO: 22.4 %
MCH RBC QN AUTO: 29.6 PG (ref 26–34)
MCHC RBC AUTO-ENTMCNC: 34.2 G/DL (ref 31–37)
MCV RBC AUTO: 86.6 FL
MICROALBUMIN UR-MCNC: 12.8 MG/DL
MICROALBUMIN/CREAT 24H UR-RTO: 289.6 UG/MG (ref ?–30)
MONOCYTES # BLD AUTO: 0.64 X10(3) UL (ref 0.1–1)
MONOCYTES NFR BLD AUTO: 7.6 %
NEUTROPHILS # BLD AUTO: 5.11 X10 (3) UL (ref 1.5–7.7)
NEUTROPHILS # BLD AUTO: 5.11 X10(3) UL (ref 1.5–7.7)
NEUTROPHILS NFR BLD AUTO: 60.7 %
NONHDLC SERPL-MCNC: 140 MG/DL (ref ?–130)
OSMOLALITY SERPL CALC.SUM OF ELEC: 287 MOSM/KG (ref 275–295)
PLATELET # BLD AUTO: 310 10(3)UL (ref 150–450)
POTASSIUM SERPL-SCNC: 3.9 MMOL/L (ref 3.5–5.1)
PROT SERPL-MCNC: 7.5 G/DL (ref 5.7–8.2)
RBC # BLD AUTO: 5.23 X10(6)UL
SODIUM SERPL-SCNC: 136 MMOL/L (ref 136–145)
TRIGL SERPL-MCNC: 167 MG/DL (ref 30–149)
TSI SER-ACNC: 3.25 UIU/ML (ref 0.55–4.78)
VLDLC SERPL CALC-MCNC: 29 MG/DL (ref 0–30)
WBC # BLD AUTO: 8.4 X10(3) UL (ref 4–11)

## 2025-02-28 PROCEDURE — 80061 LIPID PANEL: CPT

## 2025-02-28 PROCEDURE — 36415 COLL VENOUS BLD VENIPUNCTURE: CPT

## 2025-02-28 PROCEDURE — 80053 COMPREHEN METABOLIC PANEL: CPT

## 2025-02-28 PROCEDURE — 82043 UR ALBUMIN QUANTITATIVE: CPT

## 2025-02-28 PROCEDURE — 84443 ASSAY THYROID STIM HORMONE: CPT

## 2025-02-28 PROCEDURE — 85025 COMPLETE CBC W/AUTO DIFF WBC: CPT

## 2025-02-28 PROCEDURE — 82570 ASSAY OF URINE CREATININE: CPT

## 2025-02-28 RX ORDER — INSULIN LISPRO 100 [IU]/ML
INJECTION, SOLUTION INTRAVENOUS; SUBCUTANEOUS
Qty: 45 ML | Refills: 1 | Status: SHIPPED | OUTPATIENT
Start: 2025-02-28

## 2025-02-28 NOTE — TELEPHONE ENCOUNTER
Endocrine refill protocol for basal insulins     Protocol Criteria: FAILED Reason: Elevated A1C    If all below requirements are met, send a 90-day supply with 1 refill per provider protocol.       Verify Appointment with Endocrinology completed in the last 6 months or scheduled in the next 3 months.  Verify A1C has been completed within the last 6 months and is below 8.5%     Last completed office visit:2/13/2025 Taylor Gurrola MD   Next scheduled Follow up: No future appointments.   Last A1c result: Last A1c value was 9.4% done 2/13/2025.

## 2025-02-28 NOTE — TELEPHONE ENCOUNTER
Patient will be out of his insulin by tomorrow morning. Patient requesting to process script for insulin today. Please call at [phone],thanks.

## 2025-03-07 ENCOUNTER — TELEPHONE (OUTPATIENT)
Dept: ENDOCRINOLOGY CLINIC | Facility: CLINIC | Age: 55
End: 2025-03-07

## 2025-03-07 NOTE — TELEPHONE ENCOUNTER
Pt dropped off medical report needed for [employer]-  call pt when ready- he will -  Put in Dr Gurrola folder

## 2025-03-11 NOTE — TELEPHONE ENCOUNTER
CALLEd pt per note below, MA notified pt that MD is out of office today & wonmell signed it until Thursday pt stated he will stop by the office on Friday to  forms.

## 2025-04-09 DIAGNOSIS — E87.0 DM (DIABETES MELLITUS), TYPE 1, UNCONTROLLED, WITH HYPEROSMOLARITY (HCC): ICD-10-CM

## 2025-04-09 DIAGNOSIS — E10.69 DM (DIABETES MELLITUS), TYPE 1, UNCONTROLLED, WITH HYPEROSMOLARITY (HCC): ICD-10-CM

## 2025-04-09 DIAGNOSIS — E10.65 DM (DIABETES MELLITUS), TYPE 1, UNCONTROLLED, WITH HYPEROSMOLARITY (HCC): ICD-10-CM

## 2025-04-10 RX ORDER — INSULIN GLARGINE-YFGN 100 [IU]/ML
32 INJECTION, SOLUTION SUBCUTANEOUS 2 TIMES DAILY
Qty: 60 ML | Refills: 1 | Status: SHIPPED | OUTPATIENT
Start: 2025-04-10

## 2025-04-17 ENCOUNTER — TELEPHONE (OUTPATIENT)
Dept: ENDOCRINOLOGY CLINIC | Facility: CLINIC | Age: 55
End: 2025-04-17

## 2025-04-17 NOTE — TELEPHONE ENCOUNTER
Medication Quantity Refills Start End   semaglutide (OZEMPIC, 0.25 OR 0.5 MG/DOSE,) 2 MG/3ML Subcutaneous Solution Pen-injector 9 mL 1 2/13/2025 --   Sig:   Inject 0.5 mg into the skin once a week.     Key: [id number]

## 2025-04-17 NOTE — TELEPHONE ENCOUNTER
Rita states the Glargine YFGN has been on back order and is requesting alternate prescription.  States Semglee is also on back order and is not covered by patient's insurance.  Please call.  Thank you.

## 2025-04-17 NOTE — TELEPHONE ENCOUNTER
Dr. Gurrola,     Insurance will cover generic glargine. Script pended.     Called pharmacy and notified that PA for Ozempic was approved. Per tech, PA is still being prompted. Also, Semglee is on back order and not covered by insurance.     Called pt insurance and was notified that PA was approved: 3/14/2025 to 12/31/2099  but pt will need to speak to assistance program for Ozempic for it to be released to pharmacy as this is mandated by his plan: [phone]     Covered alternatives for Semglee:   Generic glargine $30 for 90  day   Lantus $90 for 90 day     Patient notified of need to change to generic insulin and to call insurance to speak with assistance program. Patient verbalized understanding.

## 2025-05-02 ENCOUNTER — TELEPHONE (OUTPATIENT)
Dept: ENDOCRINOLOGY CLINIC | Facility: CLINIC | Age: 55
End: 2025-05-02

## 2025-05-02 NOTE — TELEPHONE ENCOUNTER
Patient called to speak to Dr. Gurrola regarding getting a letter for medical leave from work until he gets used to taking Ozempic.  Please call.

## 2025-05-05 ENCOUNTER — PATIENT MESSAGE (OUTPATIENT)
Dept: ENDOCRINOLOGY CLINIC | Facility: CLINIC | Age: 55
End: 2025-05-05

## 2025-05-05 RX ORDER — ONDANSETRON 4 MG/1
4 TABLET, FILM COATED ORAL EVERY 8 HOURS PRN
Qty: 30 TABLET | Refills: 0 | Status: SHIPPED | OUTPATIENT
Start: 2025-05-05

## 2025-05-12 ENCOUNTER — TELEPHONE (OUTPATIENT)
Dept: ENDOCRINOLOGY CLINIC | Facility: CLINIC | Age: 55
End: 2025-05-12

## 2025-05-12 NOTE — TELEPHONE ENCOUNTER
Pt scheduled .   Pt also asking for an extension letter since he has not started the medication until this week.    Prior letter \" To Whom It May Concern,     Shoaib Guido (: 72) is under my care. He has started a new medication and is experiencing side effects. Please excuse him from work until 25 while his body adjusts to the new medication.     Please contact me at the above number with questions.\"     mva

## 2025-05-14 ENCOUNTER — TELEPHONE (OUTPATIENT)
Dept: ENDOCRINOLOGY CLINIC | Facility: CLINIC | Age: 55
End: 2025-05-14

## 2025-05-22 ENCOUNTER — TELEMEDICINE (OUTPATIENT)
Dept: ENDOCRINOLOGY CLINIC | Facility: CLINIC | Age: 55
End: 2025-05-22
Payer: COMMERCIAL

## 2025-05-22 DIAGNOSIS — E10.65 UNCONTROLLED TYPE 1 DIABETES MELLITUS WITH HYPERGLYCEMIA (HCC): Primary | ICD-10-CM

## 2025-05-22 PROCEDURE — 98006 SYNCH AUDIO-VIDEO EST MOD 30: CPT | Performed by: INTERNAL MEDICINE

## 2025-05-22 NOTE — PATIENT INSTRUCTIONS
DECREASE Ozempic 0.25mg subcutaneous weekly     Constipation: Miralax 1 tablespoon daily     Continue prescription for nausea as needed    Continue Prilosec -->up to 40mg daily

## 2025-05-22 NOTE — PROGRESS NOTES
-----------------------------  Dexcom Clarity  -----------------------------  Shoaib Guido    YOB: 1970    Generated at: Thu, May 22, 2025 6:04 PM CDT    Reporting period: Wed Apr 23, 2025 - Thu May 22, 2025  -----------------------------  Glucose Details    Average glucose: 185 mg/dL    GMI: 7.7%    Standard deviation: 75 mg/dL    Coefficient of Variation: 40.2%  -----------------------------  Time in Range    Very High: 19%    High: 27%    In Range: 53%    Low: 1%    Very Low: <1%    Target Range   mg/dL    -----------------------------  Sensor usage    Days with data: 28/30    Time active: 95%    Avg. calibrations per day: 0.0

## 2025-05-22 NOTE — PROGRESS NOTES
Name: Shoaib Guido  Date: 5/22/2025    This visit is conducted using Telemedicine with live, interactive video and audio.       [id number] OF PRESENT ILLNESS   Shoaib Guido is a 55 year old male who presents for diabetes mellitus.        Since last visit he has been started on Ozempic therapy.   However he has developed severe nausea leading to daily nausea and emesis.  He has started Zofran for 3 days after injection.      Prior HbA, C or glycohemoglobin were 9.3% 3/2014; 8.3% 10/2014; 8.9% 6/2015; 9.1% 1/2016; 10.2% 8/2016; 10.1% 4/2017; 10.0% 1/2018; 11.7% 6/2018; 9.8% 8/2018; 11.5% 2/2019; 10.4% 10/2019; 10.5% 2/2021; 10.1% 9/2021; 10.3% 3/2022; 9.0% 7/2022; 12.5% 3/2023; 8.5% 6/2023; 8.3% 10/2023; 9.4% 6/2024; 9.4% 2/2025     Dietary compliance: Good   Exercise: No   Polyuria/polydipsia: No  Blurred vision: No    Episodes of hypoglycemia: Rarely - nocturnal    Blood Glucose:  Checking 4 times per day  Reviewed CGM    Medications for DM  Lantus 32 units subcutaneous BID  Humalog 12 units subcutaneous TID with meals plus CF     Omnipod Pump discontinued due to tape allergy       REVIEW OF SYSTEMS  Eyes: Diabetic retinopathy present: No            Most recent visit to eye doctor in last 12 months: scheduled for 11/2023      CV: Cardiovascular disease present: No         Hypertension present: Yes         Hyperlipidemia present: Yes         Peripheral Vascular Disease present: Yes, CVA 1/2022 with some persistent numbness     : Nephropathy present: No    Neuro: Neuropathy present: Yes    Skin: Infection or ulceration: No    Osteoporosis: No    Thyroid disease: No      Medications:     Current Outpatient Medications:     ondansetron (ZOFRAN) 4 mg tablet, Take 1 tablet (4 mg total) by mouth every 8 (eight) hours as needed., Disp: 30 tablet, Rfl: 0    insulin glargine 100 UNIT/ML Subcutaneous Solution Pen-injector, Inject 32 Units into the skin 2 (two) times daily., Disp: 57.6 mL, Rfl: 1    Insulin  Glargine-yfgn 100 UNIT/ML Subcutaneous Solution Pen-injector, Inject 32 Units into the skin 2 (two) times daily., Disp: 60 mL, Rfl: 1    Insulin Lispro, 1 Unit Dial, 100 UNIT/ML Subcutaneous Solution Pen-injector, INJECT 20 UNITS INTO THE SKIN IN THE MORNING AND 20 UNITS AT NOON AND 20 UNITS IN THE BEDTIME., Disp: 45 mL, Rfl: 1    Continuous Glucose Sensor (DEXCOM G7 SENSOR) Does not apply Misc, 1 each Every 10 days., Disp: 9 each, Rfl: 1    semaglutide (OZEMPIC, 0.25 OR 0.5 MG/DOSE,) 2 MG/3ML Subcutaneous Solution Pen-injector, Inject 0.5 mg into the skin once a week., Disp: 9 mL, Rfl: 1    Continuous Glucose Transmitter (DEXCOM G6 TRANSMITTER) Does not apply Misc, 1 Device continuous., Disp: 1 each, Rfl: 3    Continuous Glucose Sensor (DEXCOM G6 SENSOR) Does not apply Misc, 1 each Every 10 days., Disp: 9 each, Rfl: 1    Insulin Pen Needle (BD PEN NEEDLE SHORT U/F) 31G X 8 MM Does not apply Misc, Inject 4 times daily, Disp: 100 each, Rfl: 3    guaiFENesin-codeine 100-10 MG/5ML Oral Solution, Take 2.5-5 mL by mouth 3 (three) times daily as needed for cough., Disp: 50 mL, Rfl: 0    ATORVASTATIN CALCIUM OR, Take by mouth., Disp: , Rfl:     Insulin Lispro, 1 Unit Dial, (HUMALOG KWIKPEN) 100 UNIT/ML Subcutaneous Solution Pen-injector, Inject 20 Units into the skin in the morning, at noon, and at bedtime. (Patient not taking: Reported on 11/10/2024), Disp: 45 mL, Rfl: 1    amLODIPine 10 MG Oral Tab, Take 1 tablet (10 mg total) by mouth daily., Disp: 90 tablet, Rfl: 1    metoprolol succinate  MG Oral Tablet 24 Hr, Take 1 tablet (100 mg total) by mouth daily., Disp: 90 tablet, Rfl: 0    Irbesartan-hydroCHLOROthiazide 150-12.5 MG Oral Tab, Take 2 tablets by mouth daily., Disp: 180 tablet, Rfl: 1    Insulin Lispro, 1 Unit Dial, 100 UNIT/ML Subcutaneous Solution Pen-injector, Inject 20 Units into the skin in the morning and 20 Units at noon and 20 Units in the bedtime., Disp: 15 mL, Rfl: 0    Insulin Glargine-[facility]  (SEMGLEE, YFGN,) 100 UNIT/ML Subcutaneous Solution Pen-injector, Inject 56 Units into the skin daily., Disp: 60 mL, Rfl: 1    Insulin Disposable Pump (OMNIPOD 5 G6 POD, GEN 5,) Does not apply Misc, 1 each every other day. Change pod every 48 hours, Disp: 9 each, Rfl: 0    triamcinolone 0.5 % External Cream, Apply to affected area twice daily, Disp: 15 g, Rfl: 1    Continuous Blood Gluc Sensor (DEXCOM G6 SENSOR) Does not apply Misc, 1 each Every 10 days., Disp: 9 each, Rfl: 3    Continuous Blood Gluc Transmit (DEXCOM G6 TRANSMITTER) Does not apply Misc, 1 each every 3 (three) months., Disp: 1 each, Rfl: 3    loratadine 10 MG Oral Tab, Take 1 tablet (10 mg total) by mouth daily., Disp: 30 tablet, Rfl: 0    aspirin (ASPIRIN 81) 81 MG Oral Tab EC, Take 1 tablet (81 mg total) by mouth daily., Disp: 90 tablet, Rfl: 3    Continuous Blood Gluc  (DEXCOM G6 ) Does not apply Device, 1 Device by Does not apply route continuous., Disp: 1 Device, Rfl: 0     Allergies:   Allergies   Allergen Reactions    Bees RASH       Social History:   Social History     Socioeconomic History    Marital status:    Tobacco Use    Smoking status: Never     Passive exposure: Never    Smokeless tobacco: Never   Vaping Use    Vaping status: Never Used   Substance and Sexual Activity    Alcohol use: Yes     Comment: occ    Drug use: Never   Other Topics Concern    Caffeine Concern Yes     Comment: 2cups    History of tanning No    Reaction to local anesthetic No       Medical History:   Past Medical History:    Other and unspecified hyperlipidemia    Pneumonia    Type I (juvenile type) diabetes mellitus without mention of complication, not stated as uncontrolled    Unspecified essential hypertension       Surgical history:   No past surgical history on file.      PHYSICAL EXAM    General Appearance:  alert, well developed, in no acute distress  Eyes:  normal conjunctivae, sclera., normal sclera and normal pupils  Throat/Neck:  normal sound to voice.   Back: no kyphosis  Respiratory:  non-labored. no increased work of breathing.    Lymph Nodes:  No abnormal nodes noted  Skin:  normal moisture and skin texture  Hematologic:  no excessive bruising    ASSESSMENT/PLAN:      1. Diabetes Mellitus Type 1, Uncontrolled  -Uncontrolled, HgA1c 9.4% -->stable   -He has tried/failed 2 different insulin pumps   -Discussed at length the importance of regular follow up and importance of decreasing BG levels  -Discussed importance of glycemic control to prevent complications of diabetes  -Discussed complications of diabetes include retinopathy, neuropathy, nephropathy and cardiovascular disease  -Discussed importance of SBGM  -Discussed importance of CHO counting  -Discussed importance of restarting dexcom   -Discussed that he is starting to develop complications of DM1 including CKD and CVA   -Discussed importance of glycemic control at length   -Insulin pump d/c'd due to tape allergy  -Since last visit he has been started on Ozempic therapy however he has developed severe GI side effects  -0verall BG levels are improved with medication  -Decrease Ozempic to 0.25mg subcutaneous weekly, verbalized understanding of risks and benefits  -If symptoms not improved in 2 weeks then will consider transition to low dose Mounjaro  -Discussed adding bowel regimen and use zofran PRN   -Continue Semglee 32 units subcutaneous BID  -Continue Humalog 12 units subcutaneous TID with meals(lower dose with GLP-1)   -Continue  CF 1:20>140   -Continue Dexcom CGM -->upgrade to G7   -Continue BP medications  -He is on 4 medications, will monitor at home and send via MyChart   -Referred to nephrology given persistent hypertension and CKD stage II   -Continue hydrochlorothiazide to 25mg PO daily   -Continue Irbesartan, amlodipine, metoprolol   -LDL improved on atorvastatin   -Normal foot exam performed 6/2024       DM quality measures:  A1C/Blood pressure: as reported above    Nephropathy screening: ordered continue ace /arb rx.   LIPID screenin2024 . on statin rx.   Last dilated eye exam:    Last diabetic foot exam: No data recorded  Dentist : recommend every 6m    Reviewed CGM during OV and adjusted insulin as noted above.     RTC 3 months; send update in 2 weeks     2025  Taylor Gurrola MD

## 2025-05-23 RX ORDER — INSULIN LISPRO 100 [IU]/ML
INJECTION, SOLUTION INTRAVENOUS; SUBCUTANEOUS
Qty: 45 ML | Refills: 1 | Status: CANCELLED | OUTPATIENT
Start: 2025-05-23

## 2025-05-23 NOTE — TELEPHONE ENCOUNTER
Per pharmacy, they do have a refill for this medication and will process right now, patient should be notified if set up.

## 2025-06-02 NOTE — TELEPHONE ENCOUNTER
Cld patient gathered the below;    Type of Leave: short term disability   Reason for Leave:Medications adjustments  Start date of leave: 05/08/2025  End date of leave:05/21/2025 w/ Return to work 05/22/2025  How many flare ups per month/length?:  How many appts per month/length?:   Was Fee and Turnaround info Given?: Yes    
Disability forms signed, faxed to alena [employer]iss, sending Player Xhart message to adv patient. Archiving forms.    
Disability forms signed, valid authorization for [employer] on file, faxing- pending confirmation   
Dr. Gurrola,       Please sign off on form if you agree to: Disability 05/08/2025-05/21/2025 w/ Return to work 05/22/2025  (place your signature on the first page only)    -From your Inbasket, Highlight the patient and click Chart   -Double click the 05/14/2025 Forms Completion telephone encounter  -Scroll down to the Media section   -Click the blue Hyperlink: disability Dr. Taylor Gurrola 05/30/2025  -Click Acknowledge located in the top right ribbon/menu   -Drag the mouse into the blank space of the document and a + sign will appear. Left click to   electronically sign the document.     Thank you,  Leesa DIAZ   
Fax yue -[employer] for pt STD-  Emailed to MIKEY-  
Form signed. Thanks.   
Received short term disability forms via e-mail in the forms department. Sent [facility] for authorization. Logged for processing.   
Detail Level: Detailed
X Size Of Lesion In Cm (Optional): 0

## 2025-06-05 ENCOUNTER — PATIENT MESSAGE (OUTPATIENT)
Dept: ENDOCRINOLOGY CLINIC | Facility: CLINIC | Age: 55
End: 2025-06-05

## 2025-06-05 NOTE — TELEPHONE ENCOUNTER
Patient providing 2 week follow up.    ASSESSMENT/PLAN:       1. Diabetes Mellitus Type 1, Uncontrolled  -Uncontrolled, HgA1c 9.4% -->stable   -He has tried/failed 2 different insulin pumps   -Discussed at length the importance of regular follow up and importance of decreasing BG levels  -Discussed importance of glycemic control to prevent complications of diabetes  -Discussed complications of diabetes include retinopathy, neuropathy, nephropathy and cardiovascular disease  -Discussed importance of SBGM  -Discussed importance of CHO counting  -Discussed importance of restarting dexcom   -Discussed that he is starting to develop complications of DM1 including CKD and CVA   -Discussed importance of glycemic control at length   -Insulin pump d/c'd due to tape allergy  -Since last visit he has been started on Ozempic therapy however he has developed severe GI side effects  -0verall BG levels are improved with medication  -Decrease Ozempic to 0.25mg subcutaneous weekly, verbalized understanding of risks and benefits  -If symptoms not improved in 2 weeks then will consider transition to low dose Mounjaro  -Discussed adding bowel regimen and use zofran PRN   -Continue Semglee 32 units subcutaneous BID  -Continue Humalog 12 units subcutaneous TID with meals(lower dose with GLP-1)   -Continue  CF 1:20>140   -Continue Dexcom CGM -->upgrade to G7   -Continue BP medications  -He is on 4 medications, will monitor at home and send via MyChart   -Referred to nephrology given persistent hypertension and CKD stage II   -Continue hydrochlorothiazide to 25mg PO daily   -Continue Irbesartan, amlodipine, metoprolol   -LDL improved on atorvastatin   -Normal foot exam performed 2024         DM quality measures:  A1C/Blood pressure: as reported above   Nephropathy screening: ordered continue ace /arb rx.   LIPID screenin2024 . on statin rx.   Last dilated eye exam:    Last diabetic foot exam: No data recorded  Dentist : recommend  every 6m     Reviewed CGM during OV and adjusted insulin as noted above.      RTC 3 months; send update in 2 weeks      5/22/2025  Tyalor Gurrola MD

## 2025-07-09 DIAGNOSIS — E11.9 DIABETES MELLITUS WITH FEATURES OF INSULIN RESISTANCE (HCC): ICD-10-CM

## 2025-07-09 RX ORDER — SEMAGLUTIDE 0.68 MG/ML
0.25 INJECTION, SOLUTION SUBCUTANEOUS WEEKLY
Qty: 9 ML | Refills: 0 | Status: SHIPPED | OUTPATIENT
Start: 2025-07-09

## 2025-07-09 RX ORDER — INSULIN LISPRO 100 [IU]/ML
INJECTION, SOLUTION INTRAVENOUS; SUBCUTANEOUS
Qty: 51 ML | Refills: 1 | Status: SHIPPED | OUTPATIENT
Start: 2025-07-09

## 2025-07-09 NOTE — TELEPHONE ENCOUNTER
Called the patient's pharmacy. Confirmed no PA required for ozempic 0.25 mg weekly. Returned call to the patient LV that refills have been sent.

## 2025-07-09 NOTE — TELEPHONE ENCOUNTER
Noted received high priority refill request. Refills pended to physician to sign due to elevated A1C. Confirmed recently adjusted doses per Pt message from 6/5/25. Ozempic may need PA since he will be continuing on the 0.25 mg dose. Please route back to staff so we may look into this once signed. Thanks.         Endocrine Refill protocol for oral and injectable diabetic medications (ozempic)    Protocol Criteria:  FAILED  Reason: Elevated A1C    If all below requirements are met, send a 90-day supply with 1 refill per provider protocol.    Verify appointment with Endocrinology completed in the last 6 months or scheduled in the next 3 months.  Verify A1C has been completed within the last 6 months and is below 8.5%     Last completed office visit: 5/22/2025 Taylor Gurrola MD   Next scheduled Follow up: No future appointments.   Last A1c result: Last A1c value was 9.4% done 2/13/2025.    Endocrine refill protocol for rapid acting, regular, intermediate, and mixed insulin:    Protocol Criteria:  FAILED Reason: Elevated A1C    If all below requirements are met, send a 90-day supply with 1 refill per provider protocol.    Verify appointment with Endocrinology completed in the last 6 months or scheduled in the next 3 months.  Verify A1C has been completed within the last 6 months and is below 8.5%    -May substitute prescriptions for Novolog and Humalog unless documented allergy (pens and vials) at the same dose and concentration per insurance preference and provider protocol.   -May substitute prescriptions for Novolin R and Humulin R unless documented allergy (pens and vials) at the same dose and concentration per insurance preference and provider protocol.   -May substitute prescriptions for Novolin N and Humulin N unless documented allergy (pens and vials) at the same dose and concentration per insurance preference and provider protocol.   -May substitute prescriptions for Humulin and Novolin 70/30 insulin unless  documented allergy at the same dose and concentration per insurance preference and provider protocol.    Last completed office visit:2/13/2025 Taylor Gurrola MD   Last completed telemed visit: 5/22/2025 Taylor Gurrola MD  Next scheduled Follow up: No future appointments.   Last A1C result: 9.4% done 2/13/2025.

## 2025-07-09 NOTE — TELEPHONE ENCOUNTER
Patient is following up on refill is traveling soon needs the medicine please follow up and also needs atorvastatin refill

## 2025-07-29 ENCOUNTER — OFFICE VISIT (OUTPATIENT)
Dept: FAMILY MEDICINE CLINIC | Facility: CLINIC | Age: 55
End: 2025-07-29
Payer: COMMERCIAL

## 2025-07-29 VITALS
RESPIRATION RATE: 18 BRPM | OXYGEN SATURATION: 96 % | SYSTOLIC BLOOD PRESSURE: 148 MMHG | TEMPERATURE: 97 F | HEART RATE: 103 BPM | DIASTOLIC BLOOD PRESSURE: 82 MMHG | WEIGHT: 239 LBS | HEIGHT: 72 IN | BODY MASS INDEX: 32.37 KG/M2

## 2025-07-29 DIAGNOSIS — H00.022 HORDEOLUM INTERNUM OF RIGHT LOWER EYELID: Primary | ICD-10-CM

## 2025-07-29 PROCEDURE — 3061F NEG MICROALBUMINURIA REV: CPT | Performed by: NURSE PRACTITIONER

## 2025-07-29 PROCEDURE — 3060F POS MICROALBUMINURIA REV: CPT | Performed by: NURSE PRACTITIONER

## 2025-07-29 PROCEDURE — 3008F BODY MASS INDEX DOCD: CPT | Performed by: NURSE PRACTITIONER

## 2025-07-29 PROCEDURE — 99213 OFFICE O/P EST LOW 20 MIN: CPT | Performed by: NURSE PRACTITIONER

## 2025-07-29 PROCEDURE — 3077F SYST BP >= 140 MM HG: CPT | Performed by: NURSE PRACTITIONER

## 2025-07-29 PROCEDURE — 3079F DIAST BP 80-89 MM HG: CPT | Performed by: NURSE PRACTITIONER

## 2025-07-29 RX ORDER — ERYTHROMYCIN 5 MG/G
OINTMENT OPHTHALMIC
Qty: 1 EACH | Refills: 0 | Status: SHIPPED | OUTPATIENT
Start: 2025-07-29

## 2025-08-24 ENCOUNTER — HOSPITAL ENCOUNTER (OUTPATIENT)
Age: 55
Discharge: HOME | End: 2025-08-24

## 2025-08-24 VITALS
HEART RATE: 104 BPM | RESPIRATION RATE: 18 BRPM | DIASTOLIC BLOOD PRESSURE: 76 MMHG | SYSTOLIC BLOOD PRESSURE: 153 MMHG | TEMPERATURE: 99 F | OXYGEN SATURATION: 96 %

## 2025-08-24 DIAGNOSIS — U07.1 COVID: Primary | ICD-10-CM

## 2025-08-24 LAB
AMB EXT COVID-19 RESULT: DETECTED
SARS-COV-2 RNA RESP QL NAA+PROBE: DETECTED

## 2025-08-24 RX ORDER — NIRMATRELVIR AND RITONAVIR 150-100 MG
KIT ORAL
Qty: 20 TABLET | Refills: 0 | Status: SHIPPED | OUTPATIENT
Start: 2025-08-24 | End: 2025-08-29

## (undated) DIAGNOSIS — IMO0002 UNCONTROLLED TYPE 1 DIABETES MELLITUS WITH COMPLICATION: ICD-10-CM

## (undated) NOTE — MR AVS SNAPSHOT
[facility]  [address] [place] [address] [phone]               Thank you for choosing us for your health care visit with Jacque Syed MD.  We are glad to serve you and happy to provide you with this summary of your visit.   Ple Today's Vital Signs     BP Pulse Height Weight BMI    126/70 mmHg 92 6' (1.829 m) 220 lb 9.6 oz (100.064 kg) 29.91 kg/m2         Current Medications          This list is accurate as of: 4/24/17 11:19 AM.  Always use your most recent med list. If you have questions, you can call [phone] to talk to our [facility] Staff. Remember, [employer] is NOT to be used for urgent needs. For medical emergencies, dial 911. Visit [email or web address] to learn more.         Educational Infor

## (undated) NOTE — LETTER
4/1/2019              Ciarra Console        [address]         Dear Melissa Peralta,    [facility] records indicate that the tests ordered for you by Ash Herrera MD  have not been done.   If you have, in fact, already completed the test

## (undated) NOTE — ED AVS SNAPSHOT
[facility] AND [facility] Immediate Care in [facility].  [address] [facility]    Phone:  [phone]    Fax:  [phone] [facility]   MRN: [id number]    Department:  [facility] AND [facility] Immediate Care in [address] [place] [address]   Date of Visit: Discharge References/Attachments     SINUSITIS (ANTIBIOTIC TREATMENT) (ENGLISH)      Disclosure     Insurance plans vary and the physician(s) referred by the Immediate Care may not be covered by your plan.   It is possible that the physician may not partici IF THERE IS ANY CHANGE OR WORSENING OF YOUR CONDITION, CALL YOUR PRIMARY CARE PHYSICIAN AT ONCE OR GO TO THE EMERGENCY DEPARTMENT.     If you have been prescribed any medication(s), please fill your prescription right away and begin taking the medication(s) you to explore options for quitting.     - If you have concerns related to behavioral health issues or thoughts of harming yourself, contact [facility] at [phone].     - If you don’t have insurance, Dariela Isaacs

## (undated) NOTE — ED AVS SNAPSHOT
[facility] AND [facility] Immediate Care in [address] [address] Yobani Flores    Phone:  [phone]    Fax:  [address]   MRN: [id number]    Department:  [facility] AND [facility] Immediate Care in [address] [place] [address]   Date of Visit: benefit level being available to you or other limited reimbursement. The physician may seek payment directly from you for amounts other than your deductible, co-payment, or co-insurance and for other services not covered under your health insurance plan. If you believe that any of the medications or instructions on this list is different from what your Primary Care doctor has instructed you - please continue to take your medications as instructed by your Primary Care doctor until you can check with your do Patient [address] to help you get signed up for insurance coverage. Patient [address] is a Federal Navigator program that can help with your Affordable Care Act coverage, as well as all types of Medicaid plans.   To get signed up and covere

## (undated) NOTE — ED AVS SNAPSHOT
[facility] AND [facility] Immediate Care in [facility] [address] [address] Yobani Flores    Phone:  [phone]    Fax:  [address]   MRN: [id number]    Department:  [facility] AND [facility] Immediate Care in [place]   Date of Visit: Insurance plans vary and the physician(s) referred by the Immediate Care may not be covered by your plan. It is possible that the physician may not participate in your health insurance plan.   This may result in a lower benefit level being available to yo CARE PHYSICIAN AT ONCE OR GO TO THE EMERGENCY DEPARTMENT. If you have been prescribed any medication(s), please fill your prescription right away and begin taking the medication(s) as directed.   If you believe that any of the medications or instructions - If you have concerns related to behavioral health issues or thoughts of harming yourself, contact [address] [place] [address] at [phone].     - If you don’t have insurance, Dariela Isaacs has partnered with Patient [employer]

## (undated) NOTE — LETTER
2025        Shoaib RODRIGUEZ Hay        [address]        [facility] [address]         To Whom It May Concern,    Shoaib Guido (: 72) is under my care. He has started a new medication and is experiencing side effects. Please excuse him from work until 25 while his body adjusts to the new medication.    Please contact me at the above number with questions.      Sincerely,    Taylor Gurrola MD  [address], [facility] [address]  [facility] [address]  Ph: [phone]  Fax: [phone]

## (undated) NOTE — ED AVS SNAPSHOT
[facility] AND [facility] Immediate Care in [address] [address] Yobani Flores    Phone:  [phone]    Fax:  [address]   MRN: [id number]    Department:  [facility] AND [facility] Immediate Care in [address] [place] [address]   Date of Visit: [address] [facility], [facility] [address]     Phone:  [phone]    - Amoxicillin-Pot Clavulanate 875-125 MG Tabs  - Fluticasone Propionate 50 MCG/ACT Susp              Discharge Instructions       Push fluids.   Rest.  Tylenol or Mot to your personal doctor) about any new or lasting problems. The primary care or specialist physician may see patients referred from the [facility] Care. Follow-up care is at the discretion of that Physician.   If you need additiona [facility] [address] [facility]) [address] [facility] [address] [address]  [place] [address]) [phone]                Additional Information       We are concerned for your overall well being:    - If you are a smoker or

## (undated) NOTE — Clinical Note
Hi,    Met with Steven Canela who has been working very hard on his diabetes/nutrition. I kept his Lantus the same at 52 units, breakfast at 12, lunch at 16, and increased dinner to 18 units. He takes his Lantus in the evening/night but has noticed a natural rise occur between 3-4pm which his dexcom reflects. I am thinking his Lantus is wearing off. Can we try for [facility]? Will start a TE for it    Thanks!   [employer]

## (undated) NOTE — LETTER
5/2/2023              Teresia Simmonds [place]cott        [address]        [place] [address]       To whom it may concern,      Sahil Caceres is currently under my medical care for treatment of Diabetes Mellitus. As part of his treatment he is required to wear an insulin pump and continuous glucose monitor. He will need to pass through security with insulin vials, syringes, and other supplies necessary for the insulin pump and continuous glucose monitor. In addition, they need to be scanned manually with the wand and will be unable to go through the scanner as this could cause an interruption with the pump and insulin delivery. Please feel free to contact my office with any additional questions.        Sincerely,    Kami Davis MD  [facility], [address] [place] [address]  [address], [place] [address] [phone]